# Patient Record
Sex: FEMALE | Race: WHITE | NOT HISPANIC OR LATINO | Employment: OTHER | URBAN - METROPOLITAN AREA
[De-identification: names, ages, dates, MRNs, and addresses within clinical notes are randomized per-mention and may not be internally consistent; named-entity substitution may affect disease eponyms.]

---

## 2018-08-03 ENCOUNTER — HOSPITAL ENCOUNTER (INPATIENT)
Facility: HOSPITAL | Age: 73
LOS: 2 days | Discharge: HOME | DRG: 389 | End: 2018-08-05
Attending: EMERGENCY MEDICINE | Admitting: INTERNAL MEDICINE
Payer: MEDICARE

## 2018-08-03 DIAGNOSIS — Z46.59 ENCOUNTER FOR NASOGASTRIC (NG) TUBE PLACEMENT: ICD-10-CM

## 2018-08-03 DIAGNOSIS — K56.600 SMALL BOWEL OBSTRUCTION, PARTIAL: Primary | ICD-10-CM

## 2018-08-03 LAB
ALBUMIN SERPL BCP-MCNC: 3.8 G/DL
ALP SERPL-CCNC: 88 U/L
ALT SERPL W/O P-5'-P-CCNC: 24 U/L
ANION GAP SERPL CALC-SCNC: 14 MMOL/L
AST SERPL-CCNC: 18 U/L
BASOPHILS # BLD AUTO: 0.02 K/UL
BASOPHILS NFR BLD: 0.2 %
BILIRUB SERPL-MCNC: 0.5 MG/DL
BILIRUB UR QL STRIP: NEGATIVE
BUN SERPL-MCNC: 33 MG/DL
CALCIUM SERPL-MCNC: 9.8 MG/DL
CHLORIDE SERPL-SCNC: 106 MMOL/L
CLARITY UR: CLEAR
CO2 SERPL-SCNC: 21 MMOL/L
COLOR UR: YELLOW
CREAT SERPL-MCNC: 1.8 MG/DL
DIFFERENTIAL METHOD: ABNORMAL
EOSINOPHIL # BLD AUTO: 0.3 K/UL
EOSINOPHIL NFR BLD: 3.1 %
ERYTHROCYTE [DISTWIDTH] IN BLOOD BY AUTOMATED COUNT: 17.9 %
EST. GFR  (AFRICAN AMERICAN): 32 ML/MIN/1.73 M^2
EST. GFR  (NON AFRICAN AMERICAN): 28 ML/MIN/1.73 M^2
GLUCOSE SERPL-MCNC: 144 MG/DL
GLUCOSE UR QL STRIP: NEGATIVE
HCT VFR BLD AUTO: 39.6 %
HGB BLD-MCNC: 12.5 G/DL
HGB UR QL STRIP: ABNORMAL
KETONES UR QL STRIP: NEGATIVE
LEUKOCYTE ESTERASE UR QL STRIP: ABNORMAL
LIPASE SERPL-CCNC: 28 U/L
LYMPHOCYTES # BLD AUTO: 1.1 K/UL
LYMPHOCYTES NFR BLD: 11.3 %
MCH RBC QN AUTO: 25.9 PG
MCHC RBC AUTO-ENTMCNC: 31.6 G/DL
MCV RBC AUTO: 82 FL
MICROSCOPIC COMMENT: ABNORMAL
MONOCYTES # BLD AUTO: 1 K/UL
MONOCYTES NFR BLD: 9.9 %
NEUTROPHILS # BLD AUTO: 7.4 K/UL
NEUTROPHILS NFR BLD: 75.5 %
NITRITE UR QL STRIP: NEGATIVE
PH UR STRIP: 6 [PH] (ref 5–8)
PLATELET # BLD AUTO: 244 K/UL
PMV BLD AUTO: 11.2 FL
POTASSIUM SERPL-SCNC: 4.9 MMOL/L
PROT SERPL-MCNC: 7.7 G/DL
PROT UR QL STRIP: ABNORMAL
RBC # BLD AUTO: 4.83 M/UL
RBC #/AREA URNS HPF: 2 /HPF (ref 0–4)
SODIUM SERPL-SCNC: 141 MMOL/L
SP GR UR STRIP: 1.02 (ref 1–1.03)
URN SPEC COLLECT METH UR: ABNORMAL
UROBILINOGEN UR STRIP-ACNC: NEGATIVE EU/DL
WBC # BLD AUTO: 9.86 K/UL
WBC #/AREA URNS HPF: 19 /HPF (ref 0–5)
WBC CLUMPS URNS QL MICRO: ABNORMAL

## 2018-08-03 PROCEDURE — 83690 ASSAY OF LIPASE: CPT

## 2018-08-03 PROCEDURE — 87077 CULTURE AEROBIC IDENTIFY: CPT

## 2018-08-03 PROCEDURE — 96376 TX/PRO/DX INJ SAME DRUG ADON: CPT

## 2018-08-03 PROCEDURE — 85025 COMPLETE CBC W/AUTO DIFF WBC: CPT

## 2018-08-03 PROCEDURE — 96375 TX/PRO/DX INJ NEW DRUG ADDON: CPT

## 2018-08-03 PROCEDURE — 25000003 PHARM REV CODE 250: Performed by: EMERGENCY MEDICINE

## 2018-08-03 PROCEDURE — 11000001 HC ACUTE MED/SURG PRIVATE ROOM

## 2018-08-03 PROCEDURE — 99285 EMERGENCY DEPT VISIT HI MDM: CPT | Mod: 25

## 2018-08-03 PROCEDURE — 63600175 PHARM REV CODE 636 W HCPCS: Performed by: EMERGENCY MEDICINE

## 2018-08-03 PROCEDURE — 81000 URINALYSIS NONAUTO W/SCOPE: CPT

## 2018-08-03 PROCEDURE — 87186 SC STD MICRODIL/AGAR DIL: CPT

## 2018-08-03 PROCEDURE — 87086 URINE CULTURE/COLONY COUNT: CPT

## 2018-08-03 PROCEDURE — 93010 ELECTROCARDIOGRAM REPORT: CPT | Mod: ,,, | Performed by: INTERNAL MEDICINE

## 2018-08-03 PROCEDURE — 80053 COMPREHEN METABOLIC PANEL: CPT

## 2018-08-03 PROCEDURE — 96361 HYDRATE IV INFUSION ADD-ON: CPT

## 2018-08-03 PROCEDURE — 96374 THER/PROPH/DIAG INJ IV PUSH: CPT

## 2018-08-03 PROCEDURE — 87088 URINE BACTERIA CULTURE: CPT

## 2018-08-03 RX ORDER — ONDANSETRON 2 MG/ML
4 INJECTION INTRAMUSCULAR; INTRAVENOUS
Status: COMPLETED | OUTPATIENT
Start: 2018-08-03 | End: 2018-08-03

## 2018-08-03 RX ORDER — DULOXETIN HYDROCHLORIDE 60 MG/1
60 CAPSULE, DELAYED RELEASE ORAL DAILY
COMMUNITY
End: 2019-06-26

## 2018-08-03 RX ORDER — HYDROMORPHONE HYDROCHLORIDE 2 MG/ML
1 INJECTION, SOLUTION INTRAMUSCULAR; INTRAVENOUS; SUBCUTANEOUS
Status: COMPLETED | OUTPATIENT
Start: 2018-08-03 | End: 2018-08-03

## 2018-08-03 RX ORDER — KETOCONAZOLE 20 MG/G
CREAM TOPICAL DAILY
COMMUNITY
End: 2019-06-26

## 2018-08-03 RX ORDER — MORPHINE SULFATE 4 MG/ML
4 INJECTION, SOLUTION INTRAMUSCULAR; INTRAVENOUS
Status: DISCONTINUED | OUTPATIENT
Start: 2018-08-03 | End: 2018-08-03

## 2018-08-03 RX ORDER — CEPHALEXIN 500 MG/1
500 CAPSULE ORAL 4 TIMES DAILY
Status: ON HOLD | COMMUNITY
End: 2018-08-05 | Stop reason: HOSPADM

## 2018-08-03 RX ADMIN — HYDROMORPHONE HYDROCHLORIDE 1 MG: 2 INJECTION, SOLUTION INTRAMUSCULAR; INTRAVENOUS; SUBCUTANEOUS at 09:08

## 2018-08-03 RX ADMIN — HYDROMORPHONE HYDROCHLORIDE 1 MG: 2 INJECTION, SOLUTION INTRAMUSCULAR; INTRAVENOUS; SUBCUTANEOUS at 11:08

## 2018-08-03 RX ADMIN — ONDANSETRON 4 MG: 2 INJECTION, SOLUTION INTRAMUSCULAR; INTRAVENOUS at 09:08

## 2018-08-03 RX ADMIN — SODIUM CHLORIDE 1000 ML: 0.9 INJECTION, SOLUTION INTRAVENOUS at 09:08

## 2018-08-04 PROBLEM — E11.9 TYPE 2 DIABETES MELLITUS: Chronic | Status: ACTIVE | Noted: 2018-08-04

## 2018-08-04 PROBLEM — M32.9 LUPUS: Chronic | Status: ACTIVE | Noted: 2018-08-04

## 2018-08-04 PROBLEM — Z90.5 HISTORY OF UNILATERAL NEPHRECTOMY: Chronic | Status: ACTIVE | Noted: 2018-08-04

## 2018-08-04 PROBLEM — Z92.21 STATUS POST CHEMOTHERAPY: Status: ACTIVE | Noted: 2018-08-04

## 2018-08-04 PROBLEM — I10 ESSENTIAL HYPERTENSION: Chronic | Status: ACTIVE | Noted: 2018-08-04

## 2018-08-04 PROBLEM — M79.7 FIBROMYALGIA: Chronic | Status: ACTIVE | Noted: 2018-08-04

## 2018-08-04 PROBLEM — N17.9 AKI (ACUTE KIDNEY INJURY): Status: ACTIVE | Noted: 2018-08-04

## 2018-08-04 PROBLEM — K56.600 SMALL BOWEL OBSTRUCTION, PARTIAL: Status: ACTIVE | Noted: 2018-08-04

## 2018-08-04 PROBLEM — C67.9 BLADDER CANCER: Chronic | Status: ACTIVE | Noted: 2018-08-04

## 2018-08-04 LAB
ANION GAP SERPL CALC-SCNC: 11 MMOL/L
BASOPHILS # BLD AUTO: 0.01 K/UL
BASOPHILS NFR BLD: 0.2 %
BUN SERPL-MCNC: 34 MG/DL
CALCIUM SERPL-MCNC: 9 MG/DL
CHLORIDE SERPL-SCNC: 109 MMOL/L
CO2 SERPL-SCNC: 22 MMOL/L
CREAT SERPL-MCNC: 1.8 MG/DL
DIFFERENTIAL METHOD: ABNORMAL
EOSINOPHIL # BLD AUTO: 0.2 K/UL
EOSINOPHIL NFR BLD: 2.8 %
ERYTHROCYTE [DISTWIDTH] IN BLOOD BY AUTOMATED COUNT: 18 %
EST. GFR  (AFRICAN AMERICAN): 32 ML/MIN/1.73 M^2
EST. GFR  (NON AFRICAN AMERICAN): 28 ML/MIN/1.73 M^2
ESTIMATED AVG GLUCOSE: 114 MG/DL
GLUCOSE SERPL-MCNC: 100 MG/DL
HBA1C MFR BLD HPLC: 5.6 %
HCT VFR BLD AUTO: 36.3 %
HGB BLD-MCNC: 11.1 G/DL
LYMPHOCYTES # BLD AUTO: 1 K/UL
LYMPHOCYTES NFR BLD: 17 %
MAGNESIUM SERPL-MCNC: 1.8 MG/DL
MCH RBC QN AUTO: 25.5 PG
MCHC RBC AUTO-ENTMCNC: 30.6 G/DL
MCV RBC AUTO: 83 FL
MONOCYTES # BLD AUTO: 0.8 K/UL
MONOCYTES NFR BLD: 14.5 %
NEUTROPHILS # BLD AUTO: 3.8 K/UL
NEUTROPHILS NFR BLD: 65.5 %
PHOSPHATE SERPL-MCNC: 4.4 MG/DL
PLATELET # BLD AUTO: 217 K/UL
PMV BLD AUTO: 10.6 FL
POCT GLUCOSE: 107 MG/DL (ref 70–110)
POCT GLUCOSE: 110 MG/DL (ref 70–110)
POCT GLUCOSE: 122 MG/DL (ref 70–110)
POCT GLUCOSE: 128 MG/DL (ref 70–110)
POTASSIUM SERPL-SCNC: 5.1 MMOL/L
RBC # BLD AUTO: 4.35 M/UL
SODIUM SERPL-SCNC: 142 MMOL/L
WBC # BLD AUTO: 5.81 K/UL

## 2018-08-04 PROCEDURE — 25000003 PHARM REV CODE 250: Performed by: NURSE PRACTITIONER

## 2018-08-04 PROCEDURE — 63600175 PHARM REV CODE 636 W HCPCS: Performed by: INTERNAL MEDICINE

## 2018-08-04 PROCEDURE — 25000003 PHARM REV CODE 250: Performed by: INTERNAL MEDICINE

## 2018-08-04 PROCEDURE — C9113 INJ PANTOPRAZOLE SODIUM, VIA: HCPCS | Performed by: INTERNAL MEDICINE

## 2018-08-04 PROCEDURE — 11000001 HC ACUTE MED/SURG PRIVATE ROOM

## 2018-08-04 PROCEDURE — 80048 BASIC METABOLIC PNL TOTAL CA: CPT

## 2018-08-04 PROCEDURE — 25000003 PHARM REV CODE 250: Performed by: EMERGENCY MEDICINE

## 2018-08-04 PROCEDURE — 84100 ASSAY OF PHOSPHORUS: CPT

## 2018-08-04 PROCEDURE — 99223 1ST HOSP IP/OBS HIGH 75: CPT | Mod: ,,, | Performed by: SURGERY

## 2018-08-04 PROCEDURE — 63600175 PHARM REV CODE 636 W HCPCS: Performed by: NURSE PRACTITIONER

## 2018-08-04 PROCEDURE — 94761 N-INVAS EAR/PLS OXIMETRY MLT: CPT

## 2018-08-04 PROCEDURE — 83036 HEMOGLOBIN GLYCOSYLATED A1C: CPT

## 2018-08-04 PROCEDURE — 85025 COMPLETE CBC W/AUTO DIFF WBC: CPT

## 2018-08-04 PROCEDURE — 63600175 PHARM REV CODE 636 W HCPCS: Performed by: EMERGENCY MEDICINE

## 2018-08-04 PROCEDURE — 83735 ASSAY OF MAGNESIUM: CPT

## 2018-08-04 RX ORDER — DEXTROSE MONOHYDRATE AND SODIUM CHLORIDE 5; .9 G/100ML; G/100ML
INJECTION, SOLUTION INTRAVENOUS CONTINUOUS
Status: DISCONTINUED | OUTPATIENT
Start: 2018-08-04 | End: 2018-08-05 | Stop reason: HOSPADM

## 2018-08-04 RX ORDER — INSULIN ASPART 100 [IU]/ML
0-5 INJECTION, SOLUTION INTRAVENOUS; SUBCUTANEOUS EVERY 6 HOURS PRN
Status: DISCONTINUED | OUTPATIENT
Start: 2018-08-04 | End: 2018-08-05 | Stop reason: HOSPADM

## 2018-08-04 RX ORDER — SODIUM CHLORIDE 9 MG/ML
INJECTION, SOLUTION INTRAVENOUS CONTINUOUS
Status: DISCONTINUED | OUTPATIENT
Start: 2018-08-04 | End: 2018-08-04

## 2018-08-04 RX ORDER — HYDROMORPHONE HYDROCHLORIDE 1 MG/ML
0.5 INJECTION, SOLUTION INTRAMUSCULAR; INTRAVENOUS; SUBCUTANEOUS EVERY 4 HOURS PRN
Status: DISCONTINUED | OUTPATIENT
Start: 2018-08-04 | End: 2018-08-04

## 2018-08-04 RX ORDER — ONDANSETRON 2 MG/ML
4 INJECTION INTRAMUSCULAR; INTRAVENOUS EVERY 6 HOURS PRN
Status: DISCONTINUED | OUTPATIENT
Start: 2018-08-04 | End: 2018-08-05 | Stop reason: HOSPADM

## 2018-08-04 RX ORDER — DIPHENHYDRAMINE HYDROCHLORIDE 50 MG/ML
12.5 INJECTION INTRAMUSCULAR; INTRAVENOUS EVERY 6 HOURS PRN
Status: DISCONTINUED | OUTPATIENT
Start: 2018-08-04 | End: 2018-08-05 | Stop reason: HOSPADM

## 2018-08-04 RX ORDER — HYDRALAZINE HYDROCHLORIDE 20 MG/ML
10 INJECTION INTRAMUSCULAR; INTRAVENOUS EVERY 6 HOURS PRN
Status: DISCONTINUED | OUTPATIENT
Start: 2018-08-04 | End: 2018-08-05 | Stop reason: HOSPADM

## 2018-08-04 RX ORDER — MORPHINE SULFATE 2 MG/ML
2 INJECTION, SOLUTION INTRAMUSCULAR; INTRAVENOUS EVERY 4 HOURS PRN
Status: DISCONTINUED | OUTPATIENT
Start: 2018-08-04 | End: 2018-08-04

## 2018-08-04 RX ORDER — GLUCAGON 1 MG
1 KIT INJECTION
Status: DISCONTINUED | OUTPATIENT
Start: 2018-08-04 | End: 2018-08-05 | Stop reason: HOSPADM

## 2018-08-04 RX ORDER — HYDROMORPHONE HYDROCHLORIDE 2 MG/ML
1 INJECTION, SOLUTION INTRAMUSCULAR; INTRAVENOUS; SUBCUTANEOUS
Status: COMPLETED | OUTPATIENT
Start: 2018-08-04 | End: 2018-08-04

## 2018-08-04 RX ADMIN — ONDANSETRON 4 MG: 2 INJECTION, SOLUTION INTRAMUSCULAR; INTRAVENOUS at 12:08

## 2018-08-04 RX ADMIN — HYDROMORPHONE HYDROCHLORIDE 1 MG: 2 INJECTION, SOLUTION INTRAMUSCULAR; INTRAVENOUS; SUBCUTANEOUS at 12:08

## 2018-08-04 RX ADMIN — DIPHENHYDRAMINE HYDROCHLORIDE 12.5 MG: 50 INJECTION, SOLUTION INTRAMUSCULAR; INTRAVENOUS at 10:08

## 2018-08-04 RX ADMIN — PROMETHAZINE HYDROCHLORIDE 12.5 MG: 25 INJECTION INTRAMUSCULAR; INTRAVENOUS at 12:08

## 2018-08-04 RX ADMIN — DEXTROSE 40 MG: 50 INJECTION, SOLUTION INTRAVENOUS at 08:08

## 2018-08-04 RX ADMIN — DEXTROSE MONOHYDRATE AND SODIUM CHLORIDE: 5; .9 INJECTION, SOLUTION INTRAVENOUS at 06:08

## 2018-08-04 RX ADMIN — SODIUM CHLORIDE: 0.9 INJECTION, SOLUTION INTRAVENOUS at 02:08

## 2018-08-04 RX ADMIN — DIPHENHYDRAMINE HYDROCHLORIDE 12.5 MG: 50 INJECTION, SOLUTION INTRAMUSCULAR; INTRAVENOUS at 04:08

## 2018-08-04 RX ADMIN — DEXTROSE MONOHYDRATE AND SODIUM CHLORIDE: 5; .9 INJECTION, SOLUTION INTRAVENOUS at 03:08

## 2018-08-04 RX ADMIN — DIPHENHYDRAMINE HYDROCHLORIDE 12.5 MG: 50 INJECTION, SOLUTION INTRAMUSCULAR; INTRAVENOUS at 11:08

## 2018-08-04 RX ADMIN — Medication 2 MG: at 08:08

## 2018-08-04 NOTE — PROGRESS NOTES
"Pt's NG tube coiled in back of throat. Attempted to pull out NG tube a few centimeters and reinsert NG tube. Pt unable to tolerate. NG tube removed. Pt refused reinsertion of NG tube. Pt stated "I don't want the tube. I have the right to refuse." Barb Zurita, [facility] Medicine NP notified. Okay to leave out NG tube per NP.  "

## 2018-08-04 NOTE — PROGRESS NOTES
[facility] Hospital Medicine  Progress Note    Patient Name: Su Callejas  MRN: [id number]  Patient Class: IP- Inpatient   Admission Date: 8/3/2018  Length of Stay: 0 days  Attending Physician: Chely Goldstein MD  Primary Care Provider: Hasmukh Perdomo MD        Subjective:     Principal Problem:Small bowel obstruction, partial    HPI:  Ms. Callejas is a 73-year-old female with a past medical history of kidney cancer with remote left nephrectomy, bladder cancer with recent chemotherapy treatment, type 2 diabetes, hypertension, lupus, fibromyalgia, and history of bowel resection in early 2017.  She presented to the emergency department with complaints of generalized abdominal pain that has progressively worsened throughout the day today.  Associated nausea, vomiting, and chills.  No aggravating or alleviating factors.  Denies any abdominal distention, diarrhea, constipation, dysuria, hematuria, hematemesis, melena, hematochezia, chest pain, palpitations, shortness breath, cough, orthopnea, edema, lightheadedness or dizziness, syncope, headache, altered mental status, or fever.  Patient states she was seen in an ED three weeks ago for abdominal pain and was diagnosed with constipation.  She states current abdominal pain is different from the pain she had three weeks ago.  She recently moved to [place] from [place] three weeks ago due to emergent family issues.  She was under the care of an oncologist and urologist in [place], and was undergoing chemotherapy treatment at the time of her move, last treatment was four weeks ago.  She completed four weeks of chemotherapy, but treatment plan was to complete six weeks.  She has not established care with a local oncologist or urologist since moving from [place].  Workup in ED resulted creatinine 1.8, BUN 33.  CT of ABD/pelvis showed multiple loops of mildly dilated small bowel in the upper abdomen with possible transition in the right mid  "abdomen suggestive of developing or partial small bowel obstruction.  In the ED, patient received IV Dilaudid, IV antiemetics, and IV fluids.  Hospital Medicine was called for admission.  Currently patient appears comfortable in no acute distress, she is drowsy from recent IV analgesics given in ED.      Hospital Course:  On 8/3 patient was admitted to [facility] secondary to a Partial SBO.  CT ABD/pelvis showed multiple loops of mildly dilated fluid-filled small bowel noted involving the upper abdomen with transition in the mid abdomen suggesting partial or developing small bowel obstruction.  Patient was placed NPO and an NGT was placed to low wall suction.  IVFs started.  As of 8/4 patient has been evaluated by Dr. Tate with general surgery who agrees with conservative treatment with ambulation and IV hydration. Patient has a h/o right nephrectomy in 2015 secondary to h/o kidney cancer.  Cr currently 1.8.  Baseline unknown as patient has recently moved from [place].  Also has a h/o bladder cancer and last reported chemo approximately 4 weeks ago.  Will need to establish outpatient f/u upon DC.  Patient reports being started on Keflex by PCP yesterday for a "bladder infection".  UA negative for nitrites, trace leukocytes.  UC pending.  Hold off on ABX for now.    Interval History:  No acute events overnight.  Resting comfortably in bed.  NGT remains to low wall suction.  Reports abdominal pain has resolved.  BS hypoactive.  General surgery following.  All questions answered.  Updated on POC.      Review of Systems   Constitutional: Positive for fatigue. Negative for chills, diaphoresis, fever and unexpected weight change.   HENT: Negative for congestion, sore throat and trouble swallowing.    Respiratory: Negative for cough, shortness of breath and wheezing.    Cardiovascular: Negative for chest pain, palpitations and leg swelling.   Gastrointestinal: Negative for abdominal distention, abdominal pain, anal bleeding, " blood in stool, constipation, diarrhea, nausea, rectal pain and vomiting.        Abdominal pain, N/V has resolved with NGT   Genitourinary: Negative for difficulty urinating, dysuria, frequency, hematuria and urgency.   Musculoskeletal: Negative for arthralgias, back pain and myalgias.   Skin: Negative for pallor, rash and wound.   Neurological: Negative for dizziness, syncope, weakness, light-headedness, numbness and headaches.   Psychiatric/Behavioral: Negative for confusion. The patient is not nervous/anxious.    All other systems reviewed and are negative.    Objective:     Vital Signs (Most Recent):  Temp: 97.9 °F (36.6 °C) (08/04/18 1105)  Pulse: 82 (08/04/18 1318)  Resp: 16 (08/04/18 1105)  BP: (!) 148/70 (08/04/18 1105)  SpO2: (!) 92 % (08/04/18 1105) Vital Signs (24h Range):  Temp:  [97.9 °F (36.6 °C)-99.4 °F (37.4 °C)] 97.9 °F (36.6 °C)  Pulse:  [] 82  Resp:  [6-25] 16  SpO2:  [92 %-99 %] 92 %  BP: (123-175)/(63-91) 148/70     Weight: 59.6 kg (131 lb 6.3 oz)  Body mass index is 24.83 kg/m².    Intake/Output Summary (Last 24 hours) at 08/04/18 1419  Last data filed at 08/04/18 0600   Gross per 24 hour   Intake              625 ml   Output              450 ml   Net              175 ml      Physical Exam   Constitutional: She is oriented to person, place, and time. She appears well-developed and well-nourished. She is easily aroused. No distress.   HENT:   Head: Normocephalic and atraumatic.   NG tube in place.   Eyes: Conjunctivae are normal.   PERRL; EOM intact.   Neck: Normal range of motion. Neck supple.   Cardiovascular: Normal rate, regular rhythm, S1 normal, S2 normal and intact distal pulses.   No extrasystoles are present. Exam reveals no gallop and no friction rub.    No murmur heard.  Pulses:       Radial pulses are 2+ on the right side, and 2+ on the left side.        Dorsalis pedis pulses are 2+ on the right side, and 2+ on the left side.        Posterior tibial pulses are 2+ on the right  side, and 2+ on the left side.   Pulmonary/Chest: Effort normal and breath sounds normal. No accessory muscle usage. No tachypnea. No respiratory distress. She has no wheezes. She has no rhonchi. She has no rales.   Abdominal: Soft. She exhibits no distension and no ascites. Bowel sounds are decreased. There is no hepatosplenomegaly. There is no tenderness. There is no rigidity, no rebound, no guarding, no CVA tenderness and negative Larose's sign.   NG tube to low wall suction with dark green-brown fluid noted   Musculoskeletal: Normal range of motion. She exhibits no edema, tenderness or deformity.   Neurological: She is alert, oriented to person, place, and time and easily aroused. No cranial nerve deficit or sensory deficit.   Skin: Skin is warm, dry and intact. Capillary refill takes less than 2 seconds. No rash noted. She is not diaphoretic. No cyanosis or erythema.   Psychiatric: Her speech is normal. Cognition and memory are normal.   Nursing note and vitals reviewed.      Significant Labs:   CBC:     Recent Labs  Lab 08/03/18 2118 08/04/18  0435   WBC 9.86 5.81   HGB 12.5 11.1*   HCT 39.6 36.3*    217     CMP:     Recent Labs  Lab 08/03/18 2118 08/04/18  0435    142   K 4.9 5.1    109   CO2 21* 22*   * 100   BUN 33* 34*   CREATININE 1.8* 1.8*   CALCIUM 9.8 9.0   PROT 7.7  --    ALBUMIN 3.8  --    BILITOT 0.5  --    ALKPHOS 88  --    AST 18  --    ALT 24  --    ANIONGAP 14 11   EGFRNONAA 28* 28*     Urine Studies:     Recent Labs  Lab 08/03/18  2233   COLORU Yellow   APPEARANCEUA Clear   PHUR 6.0   SPECGRAV 1.025   PROTEINUA Trace*   GLUCUA Negative   KETONESU Negative   BILIRUBINUA Negative   OCCULTUA 1+*   NITRITE Negative   UROBILINOGEN Negative   LEUKOCYTESUR Trace*   RBCUA 2   WBCUA 19*       Significant Imaging: I have reviewed all pertinent imaging results/findings within the past 24 hours.     Imaging Results          X-Ray Abdomen Portable (Final result)  Result time  08/04/18 08:58:57    Final result by Ofelia Arriola MD (08/04/18 08:58:57)                 Impression:      Nasogastric tube terminates in proximal stomach.  Recommend advancing at least 5cm.      Electronically signed by: Ofelia Arriola MD  Date:    08/04/2018  Time:    08:58             Narrative:    EXAMINATION:  XR ABDOMEN PORTABLE    CLINICAL HISTORY:  Encounter for fitting and adjustment of other gastrointestinal appliance and device    COMPARISON:  None    FINDINGS:  Bowel gas pattern is within normal limits.  Nasogastric tube terminates in proximal stomach.  No evidence of free air or pneumatosis.  Upright view of the chest is unremarkable.                               CT Abdomen Pelvis  Without Contrast (Final result)  Result time 08/03/18 22:46:23   Procedure changed from CT Abdomen Pelvis With Contrast     Final result by Isaiah Weiner MD (08/03/18 22:46:23)                 Impression:      Multiple loops of mildly dilated small bowel in the upper abdomen with possible transition in the right mid abdomen.  Findings suggestive of developing or partial small bowel obstruction.  Patient status post left nephrectomy.  Evidence of prior bowel surgery.    All CT scans at this facility are performed  using dose modulation techniques as appropriate to performed exam including the following:  automated exposure control; adjustment of mA and/or kV according to the patients size (this includes techniques or standardized protocols for targeted exams where dose is matched to indication/reason for exam: i.e. extremities or head);  iterative reconstruction technique.      Electronically signed by: Isaiah Weiner MD  Date:    08/03/2018  Time:    22:46             Narrative:    EXAMINATION:  CT ABDOMEN PELVIS WITHOUT CONTRAST    CLINICAL HISTORY:  Abd pain, fever, abscess suspected;    FINDINGS:  The lung bases are clear.    The liver and spleen are normal.    The gallbladder is normal. The pancreas is  unremarkable.    No adrenal masses are identified.    The right kidney is normal.  Left kidney is been removed.  Surgical clips in the upper abdomen.    Aortic and branch structure atherosclerosis.    Urinary bladder appears normal.    Multiple loops of mildly dilated fluid-filled small bowel noted involving the upper abdomen with transition in the mid abdomen suggesting partial or developing small bowel obstruction.    No acute osseous findings.                               X-Ray Chest AP Portable (Final result)  Result time 08/03/18 21:07:37    Final result by Vinh Lanier MD (08/03/18 21:07:37)                 Impression:      No acute process seen.      Electronically signed by: Vinh Lanier MD  Date:    08/03/2018  Time:    21:07             Narrative:    EXAMINATION:  XR CHEST AP PORTABLE    CLINICAL HISTORY:  epigastric pain;    FINDINGS:  Single view of the chest.  Aorta demonstrates atherosclerotic disease.    Cardiac silhouette is normal.  The lungs demonstrate no evidence of active disease.  No evidence of pleural effusion or pneumothorax.  Bones demonstrate mild degenerative change.                              Assessment/Plan:      * Small bowel obstruction, partial    - CT ABD/pelvis showed multiple loops of mildly dilated fluid-filled small bowel noted involving the upper abdomen with transition in the mid abdomen suggesting partial or developing small bowel obstruction.  - Bowel rest with NG tube to intermittent low wall suction.  - IV hydration.  - PRN antiemetics and analgesics.  - General Surgery following and agrees with conservative treatment  - Avoid narcotics  - Monitor        RYDER (acute kidney injury)    - Likely due to dehydration  - Cr currently 1.8  - Baseline Cr unknown  - IV hydration, strict I&O  - Avoid nephrotoxic agents  - Follow daily BMP        History of kidney cancer with remote unilateral nephrectomy     - Reports right nephrectomy in 2015  - Follow kidney function  closely  - Avoid nephrotoxic agents        Bladder cancer    - Recently moved from [place] where she was followed by Urology and Oncology  - Reports Last chemo approximately 4 weeks ago  - Will need to establish care with local Urologist and Oncologist upon DC        Type 2 diabetes mellitus    - HbA1c 5.6  - Accuchecks with hypoglycemic protocol while NPO.  Add SSI as needed.  - Will add dextrose to IVF's if needed while NPO.        Essential hypertension    - BP under fair control  - Continue PRN IV Hydralazine  - Monitor          VTE Risk Mitigation         Ordered     Place sequential compression device  Until discontinued      08/04/18 0321     IP VTE HIGH RISK PATIENT  Once      08/04/18 0321     Reason for No Pharmacological VTE Prophylaxis  Once      08/04/18 0321              Barb Zurita, FRANCISCO J, ACNP-BC  Department of Hospital Medicine   [facility]

## 2018-08-04 NOTE — ED PROVIDER NOTES
SCRIBE #1 NOTE: I, Corinne Mack, am scribing for, and in the presence of, Juyd Enciso Do, MD. I have scribed the entire note.      History      Chief Complaint   Patient presents with    Abdominal Pain     with n/v. Currently being treated for bladder cancer       Review of patient's allergies indicates:   Allergen Reactions    Sulfa (sulfonamide antibiotics)      Throat closing        HPI   HPI    8/3/2018, 8:42 PM   History obtained from the patient      History of Present Illness: Su Callejas is a 73 y.o. female patient s/p L kidney removal due to renal cancer with PMHx of bladder cancer who presents to the Emergency Department for abd pain which onset today. Pt moved to [place] 3 weeks ago. Per the son, pt was seen in the ED on the way to [place] to [place] for abd pain and was Dx with constipation. Pt states that this pain is not similar to 3 weeks ago. Pt has bladder cancer and received 4 weeks of chemo Tx for her bladder cancer, but was supposed to have 6 weeks of Tx. Pt was moved to [place] under emergent familial circumstances and is not set up with a urologist or an oncologist yet. Pt also reports that she has been taking her pain medication less than instructed. Symptoms are constant and moderate in severity. No mitigating or exacerbating factors reported. Associated sxs include chills and N/V. Patient denies any fever, CP, SOB, diarrhea, back pain, neck pain, dysuria, hematuria, HA, dizziness, and all other sxs at this time. No prior Tx reported. No further complaints or concerns at this time.         Arrival mode: Personal vehicle    PCP: Hasmukh Perdomo MD       Past Medical History:  Past Medical History:   Diagnosis Date    Bladder cancer     Cancer     kidney    Diabetes mellitus     Fibromyalgia     History of kidney cancer     Hypertension     Lupus        Past Surgical History:  History reviewed. No pertinent surgical history.      Family History:  History reviewed.  No pertinent family history.    Social History:  Social History     Social History Main Topics    Smoking status: Light Tobacco Smoker     Years: 25.00     Types: Cigarettes    Smokeless tobacco: Never Used    Alcohol use No    Drug use: No    Sexual activity: No       ROS   Review of Systems   Constitutional: Positive for chills. Negative for fever.   Respiratory: Negative for cough and shortness of breath.    Cardiovascular: Negative for chest pain and leg swelling.   Gastrointestinal: Positive for nausea and vomiting. Negative for abdominal pain and diarrhea.   Genitourinary: Negative for dysuria, flank pain and hematuria.   Musculoskeletal: Negative for back pain, neck pain and neck stiffness.   Skin: Negative for rash and wound.   Neurological: Negative for dizziness, light-headedness, numbness and headaches.   All other systems reviewed and are negative.    Physical Exam      Initial Vitals [08/03/18 2027]   BP Pulse Resp Temp SpO2   123/75 82 (!) 25 98.6 °F (37 °C) 96 %      MAP       --          Physical Exam  Nursing Notes and Vital Signs Reviewed.  Constitutional: Patient is in mild distress secondary to pain. Moaning in pain. Well-developed and well-nourished.  Head: Atraumatic. Normocephalic.  Eyes: PERRL. EOM intact. Conjunctivae are not pale. No scleral icterus.  ENT: Mucous membranes are moist. Oropharynx is clear and symmetric.    Neck: Supple. Full ROM. No lymphadenopathy.  Cardiovascular: Regular rate. Regular rhythm. No murmurs, rubs, or gallops. Distal pulses are 2+ and symmetric.  Pulmonary/Chest: No respiratory distress. Clear to auscultation bilaterally. No wheezing or rales.  Abdominal: Soft and non-distended.  There is generalized tenderness.  No rebound, guarding, or rigidity.  Musculoskeletal: Moves all extremities. No obvious deformities. No edema.   Skin: Warm and dry.  Neurological:  Alert, awake, and appropriate.  Normal speech.  No acute focal neurological deficits are  "appreciated.  Psychiatric: Normal affect. Good eye contact. Appropriate in content.    ED Course    Procedures  ED Vital Signs:  Vitals:    08/03/18 2027 08/03/18 2045 08/03/18 2128 08/03/18 2202   BP: 123/75   (!) 140/64   Pulse: 82  81 77   Resp: (!) 25   (!) 24   Temp: 98.6 °F (37 °C)      TempSrc: Oral      SpO2: 96%   96%   Weight:  75.4 kg (166 lb 4 oz)     Height: 5' 1" (1.549 m)       08/03/18 2301   BP: (!) 148/65   Pulse: 81   Resp: (!) 32   Temp:    TempSrc:    SpO2: 99%   Weight:    Height:        Abnormal Lab Results:  Labs Reviewed   CBC W/ AUTO DIFFERENTIAL - Abnormal; Notable for the following:        Result Value    MCH 25.9 (*)     MCHC 31.6 (*)     RDW 17.9 (*)     Gran% 75.5 (*)     Lymph% 11.3 (*)     All other components within normal limits   COMPREHENSIVE METABOLIC PANEL - Abnormal; Notable for the following:     CO2 21 (*)     Glucose 144 (*)     BUN, Bld 33 (*)     Creatinine 1.8 (*)     eGFR if  32 (*)     eGFR if non  28 (*)     All other components within normal limits   URINALYSIS - Abnormal; Notable for the following:     Protein, UA Trace (*)     Occult Blood UA 1+ (*)     Leukocytes, UA Trace (*)     All other components within normal limits   URINALYSIS MICROSCOPIC - Abnormal; Notable for the following:     WBC, UA 19 (*)     WBC Clumps, UA Occasional (*)     All other components within normal limits   LIPASE        All Lab Results:  Results for orders placed or performed during the hospital encounter of 08/03/18   CBC W/ AUTO DIFFERENTIAL   Result Value Ref Range    WBC 9.86 3.90 - 12.70 K/uL    RBC 4.83 4.00 - 5.40 M/uL    Hemoglobin 12.5 12.0 - 16.0 g/dL    Hematocrit 39.6 37.0 - 48.5 %    MCV 82 82 - 98 fL    MCH 25.9 (L) 27.0 - 31.0 pg    MCHC 31.6 (L) 32.0 - 36.0 g/dL    RDW 17.9 (H) 11.5 - 14.5 %    Platelets 244 150 - 350 K/uL    MPV 11.2 9.2 - 12.9 fL    Gran # (ANC) 7.4 1.8 - 7.7 K/uL    Lymph # 1.1 1.0 - 4.8 K/uL    Mono # 1.0 0.3 - 1.0 " K/uL    Eos # 0.3 0.0 - 0.5 K/uL    Baso # 0.02 0.00 - 0.20 K/uL    Gran% 75.5 (H) 38.0 - 73.0 %    Lymph% 11.3 (L) 18.0 - 48.0 %    Mono% 9.9 4.0 - 15.0 %    Eosinophil% 3.1 0.0 - 8.0 %    Basophil% 0.2 0.0 - 1.9 %    Differential Method Automated    Comp. Metabolic Panel   Result Value Ref Range    Sodium 141 136 - 145 mmol/L    Potassium 4.9 3.5 - 5.1 mmol/L    Chloride 106 95 - 110 mmol/L    CO2 21 (L) 23 - 29 mmol/L    Glucose 144 (H) 70 - 110 mg/dL    BUN, Bld 33 (H) 8 - 23 mg/dL    Creatinine 1.8 (H) 0.5 - 1.4 mg/dL    Calcium 9.8 8.7 - 10.5 mg/dL    Total Protein 7.7 6.0 - 8.4 g/dL    Albumin 3.8 3.5 - 5.2 g/dL    Total Bilirubin 0.5 0.1 - 1.0 mg/dL    Alkaline Phosphatase 88 55 - 135 U/L    AST 18 10 - 40 U/L    ALT 24 10 - 44 U/L    Anion Gap 14 8 - 16 mmol/L    eGFR if African American 32 (A) >60 mL/min/1.73 m^2    eGFR if non African American 28 (A) >60 mL/min/1.73 m^2   Lipase   Result Value Ref Range    Lipase 28 4 - 60 U/L   Urinalysis - Clean Catch   Result Value Ref Range    Specimen UA Urine, Clean Catch     Color, UA Yellow Yellow, Straw, Rena    Appearance, UA Clear Clear    pH, UA 6.0 5.0 - 8.0    Specific Gravity, UA 1.025 1.005 - 1.030    Protein, UA Trace (A) Negative    Glucose, UA Negative Negative    Ketones, UA Negative Negative    Bilirubin (UA) Negative Negative    Occult Blood UA 1+ (A) Negative    Nitrite, UA Negative Negative    Urobilinogen, UA Negative <2.0 EU/dL    Leukocytes, UA Trace (A) Negative   Urinalysis Microscopic   Result Value Ref Range    RBC, UA 2 0 - 4 /hpf    WBC, UA 19 (H) 0 - 5 /hpf    WBC Clumps, UA Occasional (A) None-Rare    Microscopic Comment SEE COMMENT        Imaging Results:  Imaging Results          CT Abdomen Pelvis  Without Contrast (Final result)  Result time 08/03/18 22:46:23   Procedure changed from CT Abdomen Pelvis With Contrast     Final result by Isaiah Weiner MD (08/03/18 22:46:23)                 Impression:      Multiple loops of mildly  dilated small bowel in the upper abdomen with possible transition in the right mid abdomen.  Findings suggestive of developing or partial small bowel obstruction.  Patient status post left nephrectomy.  Evidence of prior bowel surgery.    All CT scans at this facility are performed  using dose modulation techniques as appropriate to performed exam including the following:  automated exposure control; adjustment of mA and/or kV according to the patients size (this includes techniques or standardized protocols for targeted exams where dose is matched to indication/reason for exam: i.e. extremities or head);  iterative reconstruction technique.      Electronically signed by: Isaiah Weiner MD  Date:    08/03/2018  Time:    22:46             Narrative:    EXAMINATION:  CT ABDOMEN PELVIS WITHOUT CONTRAST    CLINICAL HISTORY:  Abd pain, fever, abscess suspected;    FINDINGS:  The lung bases are clear.    The liver and spleen are normal.    The gallbladder is normal. The pancreas is unremarkable.    No adrenal masses are identified.    The right kidney is normal.  Left kidney is been removed.  Surgical clips in the upper abdomen.    Aortic and branch structure atherosclerosis.    Urinary bladder appears normal.    Multiple loops of mildly dilated fluid-filled small bowel noted involving the upper abdomen with transition in the mid abdomen suggesting partial or developing small bowel obstruction.    No acute osseous findings.                               X-Ray Chest AP Portable (Final result)  Result time 08/03/18 21:07:37    Final result by Vinh Lanier MD (08/03/18 21:07:37)                 Impression:      No acute process seen.      Electronically signed by: Vinh Lanier MD  Date:    08/03/2018  Time:    21:07             Narrative:    EXAMINATION:  XR CHEST AP PORTABLE    CLINICAL HISTORY:  epigastric pain;    FINDINGS:  Single view of the chest.  Aorta demonstrates atherosclerotic disease.    Cardiac silhouette  is normal.  The lungs demonstrate no evidence of active disease.  No evidence of pleural effusion or pneumothorax.  Bones demonstrate mild degenerative change.                                 The EKG was ordered, reviewed, and independently interpreted by the ED provider.  Interpretation time: 2103  Rate: 76 BPM  Rhythm: normal sinus rhythm  Interpretation: No STEMI.             The Emergency Provider reviewed the vital signs and test results, which are outlined above.    ED Discussion     11:58 PM: Discussed case with Dr. Gomez ([facility]). Dr. Gomez agrees with current care and management of pt and accepts admission.   Admitting Service: [facility] medicine   Admitting Physician: Dr. Gomez  Admit to: Obs Tele    12:01 AM: Re-evaluated pt. I have discussed test results, shared treatment plan, and the need for admission with patient and family at bedside. Pt and family express understanding at this time and agree with all information. All questions answered. Pt and family have no further questions or concerns at this time. Pt is ready for admit.      ED Medication(s):  Medications   sodium chloride 0.9% bolus 1,000 mL (0 mLs Intravenous Stopped 8/3/18 2227)   ondansetron injection 4 mg (4 mg Intravenous Given 8/3/18 2100)   hydromorphone (PF) injection 1 mg (1 mg Intravenous Given 8/3/18 2117)   hydromorphone (PF) injection 1 mg (1 mg Intravenous Given 8/3/18 2310)       New Prescriptions    No medications on file             Medical Decision Making    Medical Decision Making:   Clinical Tests:   Lab Tests: Ordered and Reviewed  Radiological Study: Ordered and Reviewed  Medical Tests: Ordered and Reviewed           Scribe Attestation:   Scribe #1: I performed the above scribed service and the documentation accurately describes the services I performed. I attest to the accuracy of the note.    Attending:   Physician Attestation Statement for Scribe #1: I, Judy Enciso Do, MD, personally performed the  services described in this documentation, as scribed by Corinne Mack, in my presence, and it is both accurate and complete.          Clinical Impression       ICD-10-CM ICD-9-CM   1. Small bowel obstruction, partial K56.600 560.9       Disposition:   Disposition: Placed in Observation  Condition: Oumou Enciso Do, MD  08/04/18 0132

## 2018-08-04 NOTE — HOSPITAL COURSE
She was admitted with NG tube decompression, and observation. She feels better but could not recall if she had any flatus today.     8/5/18 09:16 am: The patient was seen and examined. Soft abdomen and had huge bowel movement according to the patient. Will advance diet.

## 2018-08-04 NOTE — SUBJECTIVE & OBJECTIVE
Interval History:  No acute events overnight.  Resting comfortably in bed.  NGT remains to low wall suction.  Reports abdominal pain has resolved.  BS hypoactive.  General surgery following.  All questions answered.  Updated on POC.      Review of Systems   Constitutional: Positive for fatigue. Negative for chills, diaphoresis, fever and unexpected weight change.   HENT: Negative for congestion, sore throat and trouble swallowing.    Respiratory: Negative for cough, shortness of breath and wheezing.    Cardiovascular: Negative for chest pain, palpitations and leg swelling.   Gastrointestinal: Negative for abdominal distention, abdominal pain, anal bleeding, blood in stool, constipation, diarrhea, nausea, rectal pain and vomiting.        Abdominal pain, N/V has resolved with NGT   Genitourinary: Negative for difficulty urinating, dysuria, frequency, hematuria and urgency.   Musculoskeletal: Negative for arthralgias, back pain and myalgias.   Skin: Negative for pallor, rash and wound.   Neurological: Negative for dizziness, syncope, weakness, light-headedness, numbness and headaches.   Psychiatric/Behavioral: Negative for confusion. The patient is not nervous/anxious.    All other systems reviewed and are negative.    Objective:     Vital Signs (Most Recent):  Temp: 97.9 °F (36.6 °C) (08/04/18 1105)  Pulse: 82 (08/04/18 1318)  Resp: 16 (08/04/18 1105)  BP: (!) 148/70 (08/04/18 1105)  SpO2: (!) 92 % (08/04/18 1105) Vital Signs (24h Range):  Temp:  [97.9 °F (36.6 °C)-99.4 °F (37.4 °C)] 97.9 °F (36.6 °C)  Pulse:  [] 82  Resp:  [6-25] 16  SpO2:  [92 %-99 %] 92 %  BP: (123-175)/(63-91) 148/70     Weight: 59.6 kg (131 lb 6.3 oz)  Body mass index is 24.83 kg/m².    Intake/Output Summary (Last 24 hours) at 08/04/18 1419  Last data filed at 08/04/18 0600   Gross per 24 hour   Intake              625 ml   Output              450 ml   Net              175 ml      Physical Exam   Constitutional: She is oriented to person,  place, and time. She appears well-developed and well-nourished. She is easily aroused. No distress.   HENT:   Head: Normocephalic and atraumatic.   NG tube in place.   Eyes: Conjunctivae are normal.   PERRL; EOM intact.   Neck: Normal range of motion. Neck supple.   Cardiovascular: Normal rate, regular rhythm, S1 normal, S2 normal and intact distal pulses.   No extrasystoles are present. Exam reveals no gallop and no friction rub.    No murmur heard.  Pulses:       Radial pulses are 2+ on the right side, and 2+ on the left side.        Dorsalis pedis pulses are 2+ on the right side, and 2+ on the left side.        Posterior tibial pulses are 2+ on the right side, and 2+ on the left side.   Pulmonary/Chest: Effort normal and breath sounds normal. No accessory muscle usage. No tachypnea. No respiratory distress. She has no wheezes. She has no rhonchi. She has no rales.   Abdominal: Soft. She exhibits no distension and no ascites. Bowel sounds are decreased. There is no hepatosplenomegaly. There is no tenderness. There is no rigidity, no rebound, no guarding, no CVA tenderness and negative Larose's sign.   NG tube to low wall suction with dark green-brown fluid noted   Musculoskeletal: Normal range of motion. She exhibits no edema, tenderness or deformity.   Neurological: She is alert, oriented to person, place, and time and easily aroused. No cranial nerve deficit or sensory deficit.   Skin: Skin is warm, dry and intact. Capillary refill takes less than 2 seconds. No rash noted. She is not diaphoretic. No cyanosis or erythema.   Psychiatric: Her speech is normal. Cognition and memory are normal.   Nursing note and vitals reviewed.      Significant Labs:   CBC:     Recent Labs  Lab 08/03/18 2118 08/04/18  0435   WBC 9.86 5.81   HGB 12.5 11.1*   HCT 39.6 36.3*    217     CMP:     Recent Labs  Lab 08/03/18 2118 08/04/18  0435    142   K 4.9 5.1    109   CO2 21* 22*   * 100   BUN 33* 34*    CREATININE 1.8* 1.8*   CALCIUM 9.8 9.0   PROT 7.7  --    ALBUMIN 3.8  --    BILITOT 0.5  --    ALKPHOS 88  --    AST 18  --    ALT 24  --    ANIONGAP 14 11   EGFRNONAA 28* 28*     Urine Studies:     Recent Labs  Lab 08/03/18  2233   COLORU Yellow   APPEARANCEUA Clear   PHUR 6.0   SPECGRAV 1.025   PROTEINUA Trace*   GLUCUA Negative   KETONESU Negative   BILIRUBINUA Negative   OCCULTUA 1+*   NITRITE Negative   UROBILINOGEN Negative   LEUKOCYTESUR Trace*   RBCUA 2   WBCUA 19*       Significant Imaging: I have reviewed all pertinent imaging results/findings within the past 24 hours.     Imaging Results          X-Ray Abdomen Portable (Final result)  Result time 08/04/18 08:58:57    Final result by Ofelia Arriola MD (08/04/18 08:58:57)                 Impression:      Nasogastric tube terminates in proximal stomach.  Recommend advancing at least 5cm.      Electronically signed by: Ofelia Arriola MD  Date:    08/04/2018  Time:    08:58             Narrative:    EXAMINATION:  XR ABDOMEN PORTABLE    CLINICAL HISTORY:  Encounter for fitting and adjustment of other gastrointestinal appliance and device    COMPARISON:  None    FINDINGS:  Bowel gas pattern is within normal limits.  Nasogastric tube terminates in proximal stomach.  No evidence of free air or pneumatosis.  Upright view of the chest is unremarkable.                               CT Abdomen Pelvis  Without Contrast (Final result)  Result time 08/03/18 22:46:23   Procedure changed from CT Abdomen Pelvis With Contrast     Final result by Isaiah Weiner MD (08/03/18 22:46:23)                 Impression:      Multiple loops of mildly dilated small bowel in the upper abdomen with possible transition in the right mid abdomen.  Findings suggestive of developing or partial small bowel obstruction.  Patient status post left nephrectomy.  Evidence of prior bowel surgery.    All CT scans at this facility are performed  using dose modulation techniques as  appropriate to performed exam including the following:  automated exposure control; adjustment of mA and/or kV according to the patients size (this includes techniques or standardized protocols for targeted exams where dose is matched to indication/reason for exam: i.e. extremities or head);  iterative reconstruction technique.      Electronically signed by: Isaiah Weiner MD  Date:    08/03/2018  Time:    22:46             Narrative:    EXAMINATION:  CT ABDOMEN PELVIS WITHOUT CONTRAST    CLINICAL HISTORY:  Abd pain, fever, abscess suspected;    FINDINGS:  The lung bases are clear.    The liver and spleen are normal.    The gallbladder is normal. The pancreas is unremarkable.    No adrenal masses are identified.    The right kidney is normal.  Left kidney is been removed.  Surgical clips in the upper abdomen.    Aortic and branch structure atherosclerosis.    Urinary bladder appears normal.    Multiple loops of mildly dilated fluid-filled small bowel noted involving the upper abdomen with transition in the mid abdomen suggesting partial or developing small bowel obstruction.    No acute osseous findings.                               X-Ray Chest AP Portable (Final result)  Result time 08/03/18 21:07:37    Final result by Vinh Lanier MD (08/03/18 21:07:37)                 Impression:      No acute process seen.      Electronically signed by: Vinh Lanier MD  Date:    08/03/2018  Time:    21:07             Narrative:    EXAMINATION:  XR CHEST AP PORTABLE    CLINICAL HISTORY:  epigastric pain;    FINDINGS:  Single view of the chest.  Aorta demonstrates atherosclerotic disease.    Cardiac silhouette is normal.  The lungs demonstrate no evidence of active disease.  No evidence of pleural effusion or pneumothorax.  Bones demonstrate mild degenerative change.

## 2018-08-04 NOTE — ASSESSMENT & PLAN NOTE
- CT ABD/pelvis showed multiple loops of mildly dilated fluid-filled small bowel noted involving the upper abdomen with transition in the mid abdomen suggesting partial or developing small bowel obstruction.  - Bowel rest with NG tube to intermittent low wall suction.  - IV hydration.  - PRN antiemetics and analgesics.  - General Surgery following and agrees with conservative treatment  - Avoid narcotics  - Monitor

## 2018-08-04 NOTE — PROGRESS NOTES
Aroused to accu check.  Asking where am I, reoriented x 4 successfully.  Up to BSC with standby assistance.  Restraints removed and d/c'd.  Vitals stable.

## 2018-08-04 NOTE — SUBJECTIVE & OBJECTIVE
Past Medical History:   Diagnosis Date    Bladder cancer     Cancer     kidney    Diabetes mellitus     Fibromyalgia     History of kidney cancer     Hypertension     Lupus        Past Surgical History:   Procedure Laterality Date    COLON SURGERY      Bowel resection early 2018.       Review of patient's allergies indicates:   Allergen Reactions    Sulfa (sulfonamide antibiotics)      Throat closing       No current facility-administered medications on file prior to encounter.      No current outpatient prescriptions on file prior to encounter.     Family History     Unable to obtain due to patient's mental status on admission.        Social History Main Topics    Smoking status: Light Tobacco Smoker     Years: 25.00     Types: Cigarettes    Smokeless tobacco: Never Used    Alcohol use No    Drug use: No    Sexual activity: No     Review of Systems   Constitutional: Positive for chills. Negative for diaphoresis, fatigue, fever and unexpected weight change.   HENT: Negative for congestion, sore throat and trouble swallowing.    Respiratory: Negative for cough, shortness of breath and wheezing.    Cardiovascular: Negative for chest pain, palpitations and leg swelling.   Gastrointestinal: Positive for abdominal pain, nausea and vomiting. Negative for abdominal distention, anal bleeding, blood in stool, constipation, diarrhea and rectal pain.   Genitourinary: Negative for difficulty urinating, dysuria, frequency, hematuria and urgency.   Musculoskeletal: Negative for arthralgias, back pain and myalgias.   Skin: Negative for pallor, rash and wound.   Neurological: Negative for dizziness, syncope, weakness, light-headedness, numbness and headaches.   Psychiatric/Behavioral: Negative for confusion. The patient is not nervous/anxious.    All other systems reviewed and are negative.    Objective:     Vital Signs (Most Recent):  Temp: 98.6 °F (37 °C) (08/03/18 2027)  Pulse: 86 (08/04/18 0021)  Resp: (!) 22  (08/04/18 0021)  BP: (!) 163/85 (08/04/18 0021)  SpO2: 95 % (08/04/18 0021) Vital Signs (24h Range):  Temp:  [98.6 °F (37 °C)] 98.6 °F (37 °C)  Pulse:  [77-86] 86  Resp:  [22-25] 22  SpO2:  [95 %-99 %] 95 %  BP: (123-163)/(64-85) 163/85     Weight: 59.6 kg (131 lb 6.3 oz)  Body mass index is 24.83 kg/m².    Physical Exam   Constitutional: She is oriented to person, place, and time. She appears well-developed and well-nourished. She is easily aroused. No distress.   Drowsy due to IV analgesics given in ED.   HENT:   Head: Normocephalic and atraumatic.   NG tube in place.   Eyes: Conjunctivae are normal.   PERRL; EOM intact.   Neck: Normal range of motion. Neck supple.   Cardiovascular: Normal rate, regular rhythm, S1 normal, S2 normal and intact distal pulses.   No extrasystoles are present. Exam reveals no gallop and no friction rub.    No murmur heard.  Pulses:       Radial pulses are 2+ on the right side, and 2+ on the left side.        Dorsalis pedis pulses are 2+ on the right side, and 2+ on the left side.        Posterior tibial pulses are 2+ on the right side, and 2+ on the left side.   Pulmonary/Chest: Effort normal and breath sounds normal. No accessory muscle usage. No tachypnea. No respiratory distress. She has no wheezes. She has no rhonchi. She has no rales.   Abdominal: Soft. She exhibits no distension and no ascites. Bowel sounds are decreased. There is no hepatosplenomegaly. There is generalized tenderness. There is no rigidity, no rebound, no guarding, no CVA tenderness and negative Larose's sign.   NG tube to low wall suction.   Musculoskeletal: Normal range of motion. She exhibits no edema, tenderness or deformity.   Neurological: She is oriented to person, place, and time and easily aroused. No cranial nerve deficit or sensory deficit.   Drowsy due to IV analgesics given in ED, easily aroused.  No acute focal deficits appreciated.    Skin: Skin is warm, dry and intact. Capillary refill takes less  than 2 seconds. No rash noted. She is not diaphoretic. No cyanosis or erythema.   Psychiatric: Her speech is normal. Cognition and memory are normal.   Nursing note and vitals reviewed.          Significant Labs:   Results for orders placed or performed during the hospital encounter of 08/03/18   CBC W/ AUTO DIFFERENTIAL   Result Value Ref Range    WBC 9.86 3.90 - 12.70 K/uL    RBC 4.83 4.00 - 5.40 M/uL    Hemoglobin 12.5 12.0 - 16.0 g/dL    Hematocrit 39.6 37.0 - 48.5 %    MCV 82 82 - 98 fL    MCH 25.9 (L) 27.0 - 31.0 pg    MCHC 31.6 (L) 32.0 - 36.0 g/dL    RDW 17.9 (H) 11.5 - 14.5 %    Platelets 244 150 - 350 K/uL    MPV 11.2 9.2 - 12.9 fL    Gran # (ANC) 7.4 1.8 - 7.7 K/uL    Lymph # 1.1 1.0 - 4.8 K/uL    Mono # 1.0 0.3 - 1.0 K/uL    Eos # 0.3 0.0 - 0.5 K/uL    Baso # 0.02 0.00 - 0.20 K/uL    Gran% 75.5 (H) 38.0 - 73.0 %    Lymph% 11.3 (L) 18.0 - 48.0 %    Mono% 9.9 4.0 - 15.0 %    Eosinophil% 3.1 0.0 - 8.0 %    Basophil% 0.2 0.0 - 1.9 %    Differential Method Automated    Comp. Metabolic Panel   Result Value Ref Range    Sodium 141 136 - 145 mmol/L    Potassium 4.9 3.5 - 5.1 mmol/L    Chloride 106 95 - 110 mmol/L    CO2 21 (L) 23 - 29 mmol/L    Glucose 144 (H) 70 - 110 mg/dL    BUN, Bld 33 (H) 8 - 23 mg/dL    Creatinine 1.8 (H) 0.5 - 1.4 mg/dL    Calcium 9.8 8.7 - 10.5 mg/dL    Total Protein 7.7 6.0 - 8.4 g/dL    Albumin 3.8 3.5 - 5.2 g/dL    Total Bilirubin 0.5 0.1 - 1.0 mg/dL    Alkaline Phosphatase 88 55 - 135 U/L    AST 18 10 - 40 U/L    ALT 24 10 - 44 U/L    Anion Gap 14 8 - 16 mmol/L    eGFR if African American 32 (A) >60 mL/min/1.73 m^2    eGFR if non African American 28 (A) >60 mL/min/1.73 m^2   Lipase   Result Value Ref Range    Lipase 28 4 - 60 U/L   Urinalysis - Clean Catch   Result Value Ref Range    Specimen UA Urine, Clean Catch     Color, UA Yellow Yellow, Straw, Rena    Appearance, UA Clear Clear    pH, UA 6.0 5.0 - 8.0    Specific Gravity, UA 1.025 1.005 - 1.030    Protein, UA Trace (A)  Negative    Glucose, UA Negative Negative    Ketones, UA Negative Negative    Bilirubin (UA) Negative Negative    Occult Blood UA 1+ (A) Negative    Nitrite, UA Negative Negative    Urobilinogen, UA Negative <2.0 EU/dL    Leukocytes, UA Trace (A) Negative   Urinalysis Microscopic   Result Value Ref Range    RBC, UA 2 0 - 4 /hpf    WBC, UA 19 (H) 0 - 5 /hpf    WBC Clumps, UA Occasional (A) None-Rare    Microscopic Comment SEE COMMENT       All pertinent labs within the past 24 hours have been reviewed.    Significant Imaging:   Imaging Results          CT Abdomen Pelvis  Without Contrast (Final result)  Result time 08/03/18 22:46:23   Procedure changed from CT Abdomen Pelvis With Contrast     Final result by Isaiah Weiner MD (08/03/18 22:46:23)                 Impression:      Multiple loops of mildly dilated small bowel in the upper abdomen with possible transition in the right mid abdomen.  Findings suggestive of developing or partial small bowel obstruction.  Patient status post left nephrectomy.  Evidence of prior bowel surgery.    All CT scans at this facility are performed  using dose modulation techniques as appropriate to performed exam including the following:  automated exposure control; adjustment of mA and/or kV according to the patients size (this includes techniques or standardized protocols for targeted exams where dose is matched to indication/reason for exam: i.e. extremities or head);  iterative reconstruction technique.      Electronically signed by: Isaiah Weiner MD  Date:    08/03/2018  Time:    22:46             Narrative:    EXAMINATION:  CT ABDOMEN PELVIS WITHOUT CONTRAST    CLINICAL HISTORY:  Abd pain, fever, abscess suspected;    FINDINGS:  The lung bases are clear.    The liver and spleen are normal.    The gallbladder is normal. The pancreas is unremarkable.    No adrenal masses are identified.    The right kidney is normal.  Left kidney is been removed.  Surgical clips in the upper  abdomen.    Aortic and branch structure atherosclerosis.    Urinary bladder appears normal.    Multiple loops of mildly dilated fluid-filled small bowel noted involving the upper abdomen with transition in the mid abdomen suggesting partial or developing small bowel obstruction.    No acute osseous findings.                               X-Ray Chest AP Portable (Final result)  Result time 08/03/18 21:07:37    Final result by Vinh Lanier MD (08/03/18 21:07:37)                 Impression:      No acute process seen.      Electronically signed by: Vinh Lanier MD  Date:    08/03/2018  Time:    21:07             Narrative:    EXAMINATION:  XR CHEST AP PORTABLE    CLINICAL HISTORY:  epigastric pain;    FINDINGS:  Single view of the chest.  Aorta demonstrates atherosclerotic disease.    Cardiac silhouette is normal.  The lungs demonstrate no evidence of active disease.  No evidence of pleural effusion or pneumothorax.  Bones demonstrate mild degenerative change.                               I have reviewed all pertinent imaging results/findings within the past 24 hours.

## 2018-08-04 NOTE — CONSULTS
[facility] -   General Surgery  Consult Note    Patient Name: Su Callejas  MRN: [id number]  Code Status: Full Code  Admission Date: 8/3/2018  Hospital Length of Stay: 0 days  Attending Physician: Chely Goldstein MD  Primary Care Provider: Hasmukh Perdomo MD    Patient information was obtained from patient and ER records.     Consults  Subjective:     Principal Problem: Small bowel obstruction, partial    History of Present Illness: Su is 73 year-old white female patient with a history of left nephrectomy, began to notice worsening pain with nausea and vomiting. She was brought to ED for further evaluation and determined to have SBO, likely due to adhesion from previous surgery.    No current facility-administered medications on file prior to encounter.      No current outpatient prescriptions on file prior to encounter.       Review of patient's allergies indicates:   Allergen Reactions    Sulfa (sulfonamide antibiotics)      Throat closing       Past Medical History:   Diagnosis Date    Bladder cancer     Cancer     kidney    Diabetes mellitus     Fibromyalgia     History of kidney cancer     Hypertension     Lupus      Past Surgical History:   Procedure Laterality Date    COLON SURGERY      Bowel resection early 2018.     Family History     None        Social History Main Topics    Smoking status: Light Tobacco Smoker     Years: 25.00     Types: Cigarettes    Smokeless tobacco: Never Used    Alcohol use No    Drug use: No    Sexual activity: No     Review of Systems   Constitutional: Positive for activity change and appetite change. Negative for chills and fever.   HENT: Negative for sore throat and trouble swallowing.    Eyes: Negative.    Respiratory: Negative for cough and shortness of breath.    Cardiovascular: Negative.    Gastrointestinal: Positive for abdominal pain, nausea and vomiting. Negative for abdominal distention.   Endocrine: Negative.    Genitourinary:  Negative.    Musculoskeletal: Negative.    Skin: Negative.    Allergic/Immunologic: Negative.    Neurological: Negative.    Hematological: Does not bruise/bleed easily.   Psychiatric/Behavioral: Negative.      Objective:     Vital Signs (Most Recent):  Temp: 98.7 °F (37.1 °C) (08/04/18 0730)  Pulse: 83 (08/04/18 0730)  Resp: 18 (08/04/18 0730)  BP: (!) 141/63 (08/04/18 0730)  SpO2: (!) 94 % (08/04/18 0818) Vital Signs (24h Range):  Temp:  [98.5 °F (36.9 °C)-99.4 °F (37.4 °C)] 98.7 °F (37.1 °C)  Pulse:  [] 83  Resp:  [6-25] 18  SpO2:  [94 %-99 %] 94 %  BP: (123-175)/(63-91) 141/63     Weight: 59.6 kg (131 lb 6.3 oz)  Body mass index is 24.83 kg/m².    Physical Exam   Constitutional: She is oriented to person, place, and time. She appears well-developed and well-nourished.   HENT:   Head: Normocephalic.   Right Ear: External ear normal.   Left Ear: External ear normal.   Nose: Nose normal.   Eyes: Pupils are equal, round, and reactive to light. No scleral icterus.   Neck: Normal range of motion. Neck supple. No thyromegaly present.   Cardiovascular: Normal rate, regular rhythm and normal heart sounds.    No murmur heard.  Pulmonary/Chest: Effort normal and breath sounds normal.   Abdominal: Soft. She exhibits no distension. There is tenderness. There is no guarding.   diminished sound   Musculoskeletal: Normal range of motion.   Lymphadenopathy:     She has no cervical adenopathy.   Neurological: She is alert and oriented to person, place, and time.   Skin: Skin is warm and dry.       Significant Labs:  CBC:   Recent Labs  Lab 08/04/18  0435   WBC 5.81   RBC 4.35   HGB 11.1*   HCT 36.3*      MCV 83   MCH 25.5*   MCHC 30.6*     CMP:   Recent Labs  Lab 08/03/18 2118 08/04/18  0435   * 100   CALCIUM 9.8 9.0   ALBUMIN 3.8  --    PROT 7.7  --     142   K 4.9 5.1   CO2 21* 22*    109   BUN 33* 34*   CREATININE 1.8* 1.8*   ALKPHOS 88  --    ALT 24  --    AST 18  --    BILITOT 0.5  --         Significant Diagnostics:  I have reviewed and interpreted all pertinent imaging results/findings within the past 24 hours.    Assessment/Plan:     * Small bowel obstruction, partial    Conservative management with hydration and ambulation. Will continue to monitore.          VTE Risk Mitigation         Ordered     Place sequential compression device  Until discontinued      08/04/18 0321     IP VTE HIGH RISK PATIENT  Once      08/04/18 0321     Reason for No Pharmacological VTE Prophylaxis  Once      08/04/18 0321          Thank you for your consult. I will follow-up with patient. Please contact us if you have any additional questions.    Ezequiel Tate MD  General Surgery  [facility]

## 2018-08-04 NOTE — ASSESSMENT & PLAN NOTE
- Likely due to dehydration  - Cr currently 1.8  - Baseline Cr unknown  - IV hydration, strict I&O  - Avoid nephrotoxic agents  - Follow daily BMP

## 2018-08-04 NOTE — HPI
Su is 73 year-old white female patient with a history of left nephrectomy, began to notice worsening pain with nausea and vomiting. She was brought to ED for further evaluation and determined to have SBO, likely due to adhesion from previous surgery.

## 2018-08-04 NOTE — H&P
[facility] Hospital Medicine  History & Physical    Patient Name: Su Callejas  MRN: [id number]  Admission Date: 8/4/2018  Attending Physician: Yann Jay, *   Primary Care Provider: Hasmukh Perdomo MD         Patient information was obtained from patient, relative(s) and ER records.     Subjective:     Principal Problem:Small bowel obstruction, partial    Chief Complaint:   Chief Complaint   Patient presents with    Abdominal Pain     with n/v. Currently being treated for bladder cancer        HPI: Ms. Callejas is a 73-year-old female with a past medical history of kidney cancer with remote left nephrectomy, bladder cancer with recent chemotherapy treatment, type 2 diabetes, hypertension, lupus, fibromyalgia, and history of bowel resection in early 2017.  She presented to the emergency department with complaints of generalized abdominal pain that has progressively worsened throughout the day today.  Associated nausea, vomiting, and chills.  No aggravating or alleviating factors.  Denies any abdominal distention, diarrhea, constipation, dysuria, hematuria, hematemesis, melena, hematochezia, chest pain, palpitations, shortness breath, cough, orthopnea, edema, lightheadedness or dizziness, syncope, headache, altered mental status, or fever.  Patient states she was seen in an ED three weeks ago for abdominal pain and was diagnosed with constipation.  She states current abdominal pain is different from the pain she had three weeks ago.  She recently moved to [place] from [place] three weeks ago due to emergent family issues.  She was under the care of an oncologist and urologist in [place], and was undergoing chemotherapy treatment at the time of her move, last treatment was four weeks ago.  She completed four weeks of chemotherapy, but treatment plan was to complete six weeks.  She has not established care with a local oncologist or urologist since moving from [place].  Workup in  ED resulted creatinine 1.8, BUN 33.  CT of ABD/pelvis showed multiple loops of mildly dilated small bowel in the upper abdomen with possible transition in the right mid abdomen suggestive of developing or partial small bowel obstruction.  In the ED, patient received IV Dilaudid, IV antiemetics, and IV fluids.  Hospital Medicine was called for admission.  Currently patient appears comfortable in no acute distress, she is drowsy from recent IV analgesics given in ED.        Past Medical History:   Diagnosis Date    Bladder cancer     Cancer     kidney    Diabetes mellitus     Fibromyalgia     History of kidney cancer     Hypertension     Lupus        Past Surgical History:   Procedure Laterality Date    COLON SURGERY      Bowel resection early 2018.       Review of patient's allergies indicates:   Allergen Reactions    Sulfa (sulfonamide antibiotics)      Throat closing       No current facility-administered medications on file prior to encounter.      No current outpatient prescriptions on file prior to encounter.     Family History     Unable to obtain due to patient's mental status on admission.        Social History Main Topics    Smoking status: Light Tobacco Smoker     Years: 25.00     Types: Cigarettes    Smokeless tobacco: Never Used    Alcohol use No    Drug use: No    Sexual activity: No     Review of Systems   Constitutional: Positive for chills. Negative for diaphoresis, fatigue, fever and unexpected weight change.   HENT: Negative for congestion, sore throat and trouble swallowing.    Respiratory: Negative for cough, shortness of breath and wheezing.    Cardiovascular: Negative for chest pain, palpitations and leg swelling.   Gastrointestinal: Positive for abdominal pain, nausea and vomiting. Negative for abdominal distention, anal bleeding, blood in stool, constipation, diarrhea and rectal pain.   Genitourinary: Negative for difficulty urinating, dysuria, frequency, hematuria and urgency.    Musculoskeletal: Negative for arthralgias, back pain and myalgias.   Skin: Negative for pallor, rash and wound.   Neurological: Negative for dizziness, syncope, weakness, light-headedness, numbness and headaches.   Psychiatric/Behavioral: Negative for confusion. The patient is not nervous/anxious.    All other systems reviewed and are negative.    Objective:     Vital Signs (Most Recent):  Temp: 98.6 °F (37 °C) (08/03/18 2027)  Pulse: 86 (08/04/18 0021)  Resp: (!) 22 (08/04/18 0021)  BP: (!) 163/85 (08/04/18 0021)  SpO2: 95 % (08/04/18 0021) Vital Signs (24h Range):  Temp:  [98.6 °F (37 °C)] 98.6 °F (37 °C)  Pulse:  [77-86] 86  Resp:  [22-25] 22  SpO2:  [95 %-99 %] 95 %  BP: (123-163)/(64-85) 163/85     Weight: 59.6 kg (131 lb 6.3 oz)  Body mass index is 24.83 kg/m².    Physical Exam   Constitutional: She is oriented to person, place, and time. She appears well-developed and well-nourished. She is easily aroused. No distress.   Drowsy due to IV analgesics given in ED.   HENT:   Head: Normocephalic and atraumatic.   NG tube in place.   Eyes: Conjunctivae are normal.   PERRL; EOM intact.   Neck: Normal range of motion. Neck supple.   Cardiovascular: Normal rate, regular rhythm, S1 normal, S2 normal and intact distal pulses.   No extrasystoles are present. Exam reveals no gallop and no friction rub.    No murmur heard.  Pulses:       Radial pulses are 2+ on the right side, and 2+ on the left side.        Dorsalis pedis pulses are 2+ on the right side, and 2+ on the left side.        Posterior tibial pulses are 2+ on the right side, and 2+ on the left side.   Pulmonary/Chest: Effort normal and breath sounds normal. No accessory muscle usage. No tachypnea. No respiratory distress. She has no wheezes. She has no rhonchi. She has no rales.   Abdominal: Soft. She exhibits no distension and no ascites. Bowel sounds are decreased. There is no hepatosplenomegaly. There is generalized tenderness. There is no rigidity, no  rebound, no guarding, no CVA tenderness and negative Larose's sign.   NG tube to low wall suction.   Musculoskeletal: Normal range of motion. She exhibits no edema, tenderness or deformity.   Neurological: She is oriented to person, place, and time and easily aroused. No cranial nerve deficit or sensory deficit.   Drowsy due to IV analgesics given in ED, easily aroused.  No acute focal deficits appreciated.    Skin: Skin is warm, dry and intact. Capillary refill takes less than 2 seconds. No rash noted. She is not diaphoretic. No cyanosis or erythema.   Psychiatric: Her speech is normal. Cognition and memory are normal.   Nursing note and vitals reviewed.          Significant Labs:   Results for orders placed or performed during the hospital encounter of 08/03/18   CBC W/ AUTO DIFFERENTIAL   Result Value Ref Range    WBC 9.86 3.90 - 12.70 K/uL    RBC 4.83 4.00 - 5.40 M/uL    Hemoglobin 12.5 12.0 - 16.0 g/dL    Hematocrit 39.6 37.0 - 48.5 %    MCV 82 82 - 98 fL    MCH 25.9 (L) 27.0 - 31.0 pg    MCHC 31.6 (L) 32.0 - 36.0 g/dL    RDW 17.9 (H) 11.5 - 14.5 %    Platelets 244 150 - 350 K/uL    MPV 11.2 9.2 - 12.9 fL    Gran # (ANC) 7.4 1.8 - 7.7 K/uL    Lymph # 1.1 1.0 - 4.8 K/uL    Mono # 1.0 0.3 - 1.0 K/uL    Eos # 0.3 0.0 - 0.5 K/uL    Baso # 0.02 0.00 - 0.20 K/uL    Gran% 75.5 (H) 38.0 - 73.0 %    Lymph% 11.3 (L) 18.0 - 48.0 %    Mono% 9.9 4.0 - 15.0 %    Eosinophil% 3.1 0.0 - 8.0 %    Basophil% 0.2 0.0 - 1.9 %    Differential Method Automated    Comp. Metabolic Panel   Result Value Ref Range    Sodium 141 136 - 145 mmol/L    Potassium 4.9 3.5 - 5.1 mmol/L    Chloride 106 95 - 110 mmol/L    CO2 21 (L) 23 - 29 mmol/L    Glucose 144 (H) 70 - 110 mg/dL    BUN, Bld 33 (H) 8 - 23 mg/dL    Creatinine 1.8 (H) 0.5 - 1.4 mg/dL    Calcium 9.8 8.7 - 10.5 mg/dL    Total Protein 7.7 6.0 - 8.4 g/dL    Albumin 3.8 3.5 - 5.2 g/dL    Total Bilirubin 0.5 0.1 - 1.0 mg/dL    Alkaline Phosphatase 88 55 - 135 U/L    AST 18 10 - 40 U/L     ALT 24 10 - 44 U/L    Anion Gap 14 8 - 16 mmol/L    eGFR if African American 32 (A) >60 mL/min/1.73 m^2    eGFR if non African American 28 (A) >60 mL/min/1.73 m^2   Lipase   Result Value Ref Range    Lipase 28 4 - 60 U/L   Urinalysis - Clean Catch   Result Value Ref Range    Specimen UA Urine, Clean Catch     Color, UA Yellow Yellow, Straw, Rena    Appearance, UA Clear Clear    pH, UA 6.0 5.0 - 8.0    Specific Gravity, UA 1.025 1.005 - 1.030    Protein, UA Trace (A) Negative    Glucose, UA Negative Negative    Ketones, UA Negative Negative    Bilirubin (UA) Negative Negative    Occult Blood UA 1+ (A) Negative    Nitrite, UA Negative Negative    Urobilinogen, UA Negative <2.0 EU/dL    Leukocytes, UA Trace (A) Negative   Urinalysis Microscopic   Result Value Ref Range    RBC, UA 2 0 - 4 /hpf    WBC, UA 19 (H) 0 - 5 /hpf    WBC Clumps, UA Occasional (A) None-Rare    Microscopic Comment SEE COMMENT       All pertinent labs within the past 24 hours have been reviewed.    Significant Imaging:   Imaging Results          CT Abdomen Pelvis  Without Contrast (Final result)  Result time 08/03/18 22:46:23   Procedure changed from CT Abdomen Pelvis With Contrast     Final result by Isaiah Weiner MD (08/03/18 22:46:23)                 Impression:      Multiple loops of mildly dilated small bowel in the upper abdomen with possible transition in the right mid abdomen.  Findings suggestive of developing or partial small bowel obstruction.  Patient status post left nephrectomy.  Evidence of prior bowel surgery.    All CT scans at this facility are performed  using dose modulation techniques as appropriate to performed exam including the following:  automated exposure control; adjustment of mA and/or kV according to the patients size (this includes techniques or standardized protocols for targeted exams where dose is matched to indication/reason for exam: i.e. extremities or head);  iterative reconstruction  technique.      Electronically signed by: Isaiah Weiner MD  Date:    08/03/2018  Time:    22:46             Narrative:    EXAMINATION:  CT ABDOMEN PELVIS WITHOUT CONTRAST    CLINICAL HISTORY:  Abd pain, fever, abscess suspected;    FINDINGS:  The lung bases are clear.    The liver and spleen are normal.    The gallbladder is normal. The pancreas is unremarkable.    No adrenal masses are identified.    The right kidney is normal.  Left kidney is been removed.  Surgical clips in the upper abdomen.    Aortic and branch structure atherosclerosis.    Urinary bladder appears normal.    Multiple loops of mildly dilated fluid-filled small bowel noted involving the upper abdomen with transition in the mid abdomen suggesting partial or developing small bowel obstruction.    No acute osseous findings.                               X-Ray Chest AP Portable (Final result)  Result time 08/03/18 21:07:37    Final result by Vinh Lanier MD (08/03/18 21:07:37)                 Impression:      No acute process seen.      Electronically signed by: Vinh Lanier MD  Date:    08/03/2018  Time:    21:07             Narrative:    EXAMINATION:  XR CHEST AP PORTABLE    CLINICAL HISTORY:  epigastric pain;    FINDINGS:  Single view of the chest.  Aorta demonstrates atherosclerotic disease.    Cardiac silhouette is normal.  The lungs demonstrate no evidence of active disease.  No evidence of pleural effusion or pneumothorax.  Bones demonstrate mild degenerative change.                               I have reviewed all pertinent imaging results/findings within the past 24 hours.          Assessment/Plan:     * Small bowel obstruction, partial    - CT ABD/pelvis showed multiple loops of mildly dilated fluid-filled small bowel noted involving the upper abdomen with transition in the mid abdomen suggesting partial or developing small bowel obstruction.  - Bowel rest with NG tube to intermittent low wall suction.  - IV hydration.  - PRN  antiemetics and analgesics.  - General Surgery consult in AM.  - F/U ABD XR.        RYDER (acute kidney injury)    - Likely due to dehydration.  - IV hydration, strict I&O's.  - Follow daily BMP.        Essential hypertension    - BP stable.  - PRN IV hydralazine.        Type 2 diabetes mellitus    - Check HbA1c.  - Accuchecks with hypoglycemic protocol while NPO.  Add SSI as needed.  - Will add dextrose to IVF's if needed while NPO.        History of kidney cancer with remote unilateral nephrectomy     - Follow kidney function closely.  - Avoid nephrotoxic agents.         Bladder cancer    - Last chemo 4 weeks ago.  - Will need to establish care with Urology and Oncology.          VTE Risk Mitigation         Ordered     Place sequential compression device  Until discontinued      08/04/18 0321     IP VTE HIGH RISK PATIENT  Once      08/04/18 0321     Reason for No Pharmacological VTE Prophylaxis  Once      08/04/18 0321             Palak Walsh NP  Department of Hospital Medicine   [facility]

## 2018-08-04 NOTE — SUBJECTIVE & OBJECTIVE
No current facility-administered medications on file prior to encounter.      No current outpatient prescriptions on file prior to encounter.       Review of patient's allergies indicates:   Allergen Reactions    Sulfa (sulfonamide antibiotics)      Throat closing       Past Medical History:   Diagnosis Date    Bladder cancer     Cancer     kidney    Diabetes mellitus     Fibromyalgia     History of kidney cancer     Hypertension     Lupus      Past Surgical History:   Procedure Laterality Date    COLON SURGERY      Bowel resection early 2018.     Family History     None        Social History Main Topics    Smoking status: Light Tobacco Smoker     Years: 25.00     Types: Cigarettes    Smokeless tobacco: Never Used    Alcohol use No    Drug use: No    Sexual activity: No     Review of Systems   Constitutional: Positive for activity change and appetite change. Negative for chills and fever.   HENT: Negative for sore throat and trouble swallowing.    Eyes: Negative.    Respiratory: Negative for cough and shortness of breath.    Cardiovascular: Negative.    Gastrointestinal: Positive for abdominal pain, nausea and vomiting. Negative for abdominal distention.   Endocrine: Negative.    Genitourinary: Negative.    Musculoskeletal: Negative.    Skin: Negative.    Allergic/Immunologic: Negative.    Neurological: Negative.    Hematological: Does not bruise/bleed easily.   Psychiatric/Behavioral: Negative.      Objective:     Vital Signs (Most Recent):  Temp: 98.7 °F (37.1 °C) (08/04/18 0730)  Pulse: 83 (08/04/18 0730)  Resp: 18 (08/04/18 0730)  BP: (!) 141/63 (08/04/18 0730)  SpO2: (!) 94 % (08/04/18 0818) Vital Signs (24h Range):  Temp:  [98.5 °F (36.9 °C)-99.4 °F (37.4 °C)] 98.7 °F (37.1 °C)  Pulse:  [] 83  Resp:  [6-25] 18  SpO2:  [94 %-99 %] 94 %  BP: (123-175)/(63-91) 141/63     Weight: 59.6 kg (131 lb 6.3 oz)  Body mass index is 24.83 kg/m².    Physical Exam   Constitutional: She is oriented to  person, place, and time. She appears well-developed and well-nourished.   HENT:   Head: Normocephalic.   Right Ear: External ear normal.   Left Ear: External ear normal.   Nose: Nose normal.   Eyes: Pupils are equal, round, and reactive to light. No scleral icterus.   Neck: Normal range of motion. Neck supple. No thyromegaly present.   Cardiovascular: Normal rate, regular rhythm and normal heart sounds.    No murmur heard.  Pulmonary/Chest: Effort normal and breath sounds normal.   Abdominal: Soft. She exhibits no distension. There is tenderness. There is no guarding.   diminished sound   Musculoskeletal: Normal range of motion.   Lymphadenopathy:     She has no cervical adenopathy.   Neurological: She is alert and oriented to person, place, and time.   Skin: Skin is warm and dry.       Significant Labs:  CBC:   Recent Labs  Lab 08/04/18  0435   WBC 5.81   RBC 4.35   HGB 11.1*   HCT 36.3*      MCV 83   MCH 25.5*   MCHC 30.6*     CMP:   Recent Labs  Lab 08/03/18  2118 08/04/18  0435   * 100   CALCIUM 9.8 9.0   ALBUMIN 3.8  --    PROT 7.7  --     142   K 4.9 5.1   CO2 21* 22*    109   BUN 33* 34*   CREATININE 1.8* 1.8*   ALKPHOS 88  --    ALT 24  --    AST 18  --    BILITOT 0.5  --        Significant Diagnostics:  I have reviewed and interpreted all pertinent imaging results/findings within the past 24 hours.

## 2018-08-04 NOTE — CONSULTS
"  [facility]   Adult Nutrition  Consult Note    SUMMARY     Recommendations  Recommendation/Intervention: 1.) Recommend Clinimix /15 @ goal rate 55 mls/hr continuous + 20% lipids daily (250mls) providing 1437 kcals/day, 66 g protein/day, and 1320 mls fluid/day. GIR: 2.3 mg/kg/min.   Goals: 1.) patient will meet >80% of EEN while admitted  Nutrition Goal Status: new  Communication of RD Recs:  (sticky note)     1. Small bowel obstruction, partial    2. Encounter for nasogastric (NG) tube placement      Past Medical History:   Diagnosis Date    Bladder cancer     Cancer     kidney    Diabetes mellitus     Fibromyalgia     History of kidney cancer     Hypertension     Lupus          Reason for Assessment  Reason for Assessment: consult  General Information Comments: Admits with SBO. PMH of bladder cancer. Significant weight loss noted per chart. On site RD to follow up with physical assessment. Remains NPO.     Nutrition Risk Screen  Nutrition Risk Screen: other (see comments) (recent chemotherapy)    Nutrition/Diet History  Do you have any cultural, spiritual, Yarsani conflicts, given your current situation?: none  Food Allergies: NKFA  Factors Affecting Nutritional Intake: abdominal pain, NPO    Anthropometrics  Temp: 97.9 °F (36.6 °C)  Height Method: Stated  Height: 5' 1"  Height (inches): 61 in  Weight Method: Bed Scale  Weight: 59.6 kg (131 lb 6.3 oz)  Weight (lb): 131.4 lb  Ideal Body Weight (IBW), Female: 105 lb  % Ideal Body Weight, Female (lb): 125.14 lb  BMI (Calculated): 24.9  BMI Grade: 18.5-24.9 - normal  Weight Loss: unintentional  Usual Body Weight (UBW), k kg  % Usual Body Weight: 79.63  % Weight Change From Usual Weight: -20.53 %    Lab/Procedures/Meds  Pertinent Labs Reviewed: reviewed  BMP  Lab Results   Component Value Date     2018    K 5.1 2018     2018    CO2 22 (L) 2018    BUN 34 (H) 2018    CREATININE 1.8 (H) 2018    " CALCIUM 9.0 08/04/2018    ANIONGAP 11 08/04/2018    ESTGFRAFRICA 32 (A) 08/04/2018    EGFRNONAA 28 (A) 08/04/2018     Lab Results   Component Value Date    ALBUMIN 3.8 08/03/2018     Lab Results   Component Value Date    CALCIUM 9.0 08/04/2018    PHOS 4.4 08/04/2018       Recent Labs  Lab 08/04/18  1107   POCTGLUCOSE 122*       Pertinent Medications Reviewed: reviewed  Scheduled Meds:   pantoprazole 40 mg in dextrose 5 % 100 mL IVPB (over 15 minutes) (ready to mix system)  40 mg Intravenous Daily     Continuous Infusions:   dextrose 5 % and 0.9 % NaCl 125 mL/hr at 08/04/18 0624         Physical Findings/Assessment  Overall Physical Appearance:  (frank)  Skin:  (maynor score 18)    Estimated/Assessed Needs  Weight Used For Calorie Calculations: 59.6 kg (131 lb 6.3 oz)  Energy Calorie Requirements (kcal): 5814-2041  Energy Need Method: [place]-St Jeor (1.3)  Protein Requirements: 59-71  Weight Used For Protein Calculations: 59.6 kg (131 lb 6.3 oz) (1-1.2)  Fluid Need Method: RDA Method (or per MD)  RDA Method (mL): 1400      Nutrition Prescription Ordered  Current Diet Order: NPO    Evaluation of Received Nutrient/Fluid Intake  Other Calories (kcal): 510  Energy Calories Required: not meeting needs  Protein Required: not meeting needs  % Intake of Estimated Energy Needs: 0 - 25 %  % Meal Intake: NPO    Nutrition Risk  -    Assessment and Plan  Nutrition Problem  Altered GI Function    Related to (etiology):   SBO    Signs and Symptoms (as evidenced by):   NPO status    Interventions/Recommendations (treatment strategy):  Initiate TPN    Nutrition Diagnosis Status:   New             Monitor and Evaluation  Food and Nutrient Intake: energy intake, parenteral nutrition intake  Food and Nutrient Adminstration: enteral and parenteral nutrition administration  Anthropometric Measurements: weight, weight change, body mass index  Biochemical Data, Medical Tests and Procedures: electrolyte and renal panel, glucose/endocrine  profile, lipid profile  Nutrition-Focused Physical Findings: overall appearance     Nutrition Follow-Up  RD Follow-up?: Yes

## 2018-08-04 NOTE — ASSESSMENT & PLAN NOTE
- CT ABD/pelvis showed multiple loops of mildly dilated fluid-filled small bowel noted involving the upper abdomen with transition in the mid abdomen suggesting partial or developing small bowel obstruction.  - Bowel rest with NG tube to intermittent low wall suction.  - IV hydration.  - PRN antiemetics and analgesics.  - General Surgery consult in AM.  - F/U ABD XR.

## 2018-08-04 NOTE — PLAN OF CARE
Problem: Patient Care Overview  Goal: Plan of Care Review  Outcome: Ongoing (interventions implemented as appropriate)  Pt AAO x4.  VSS.  Pt remained afebrile throughout this shift.   IV fluids administered per order.   Pt remained free of falls this shift.   Pt complained of throat pain this shift.  Plan of care reviewed. Patient verbalizes understanding.   Pain meds administered as ordered.   Patient complained of itching and nausea this shift. PRN meds administered as ordered.  Pt moving/turing standby assist.   Patient NSR on monitor.   Bed low, side rails up x 2, wheels locked, call light in reach.   Patient instructed to call for assistance.   Hourly rounding completed.   Will continue to monitor.

## 2018-08-04 NOTE — PLAN OF CARE
Problem: Nutrition, Parenteral (Adult)  Goal: Signs and Symptoms of Listed Potential Problems Will be Absent, Minimized or Managed (Nutrition, Parenteral)  Signs and symptoms of listed potential problems will be absent, minimized or managed by discharge/transition of care (reference Nutrition, Parenteral (Adult) CPG).  Outcome: Ongoing (interventions implemented as appropriate)  Recommendations  Recommendation/Intervention: 1.) Recommend Clinimix 5/15 @ goal rate 55 mls/hr continuous + 20% lipids daily (250mls) providing 1437 kcals/day, 66 g protein/day, and 1320 mls fluid/day. GIR: 2.3 mg/kg/min.   Goals: 1.) patient will meet >80% of EEN while admitted  Nutrition Goal Status: new  Communication of RD Recs:  (sticky note)

## 2018-08-04 NOTE — ASSESSMENT & PLAN NOTE
- Check HbA1c.  - Accuchecks with hypoglycemic protocol while NPO.  Add SSI as needed.  - Will add dextrose to IVF's if needed while NPO.

## 2018-08-04 NOTE — PROGRESS NOTES
Admitted to OBS rm. 233 from ER.  Bedside report per Miriam SANCHES.  Son Ray accompanied patient from ER.  Son physically restraining patients arms to keep from pulling out NGT.  Is lethargic from recent pain meds - see MAR.  Arouses to name.  Briefly opens eyes.  Orients to self only.  Is Agitated and restless whenever awake.  Tries to get OOB and remove medical equipment.  Son requests Bilat. Soft wrist restraints for safety.  NP C. Buuck phoned - new orders noted.    RR 14 bpm.  BBS CTA.  On 3L nasal cannula.  Abd. Soft - tender to palpation.  Old scar midline abd.  Son says had bowel resection earlier this year.  BS absent x 4 quads.  Skin wnl.  IVF's infusing.  L AC IV site wnl.  NGT to LIWS.  Small amt. Of yellow drainage noted.  Son to go home, will return ASAP.

## 2018-08-04 NOTE — PLAN OF CARE
Sw met with pt and son at bedside to complete assessment. Sw explained role/purpose of visit. Transitional navigator was provided to pt. Pt reports she used a walker when she was in [place] but haven't been using one since she has relocated to [place]. Pt's son asked about Home Health. Sw provided pt a list of HH companies. Pt chose [facility] and [facility] second. Pt's choice form will be placed in record. Sw will send referral through [employer]. No further needs at this time.      08/04/18 0207   Discharge Assessment   Assessment Type Discharge Planning Assessment   Confirmed/corrected address and phone number on facesheet? Yes   Assessment information obtained from? Caregiver   Prior to hospitilization cognitive status: Alert/Oriented   Prior to hospitalization functional status: Independent   Current cognitive status: Alert/Oriented   Current Functional Status: Assistive Equipment   Lives With child(marybel), adult   Able to Return to Prior Arrangements yes   Is patient able to care for self after discharge? Unable to determine at this time (comments)   Who are your caregiver(s) and their phone number(s)? Oumar Braswell, son, [phone]   Patient's perception of discharge disposition home or selfcare;home health   Readmission Within The Last 30 Days no previous admission in last 30 days   Patient currently being followed by outpatient case management? No   Patient currently receives any other outside agency services? No   Equipment Currently Used at Home walker, rolling  ([place] [employer])   Do you have any problems affording any of your prescribed medications? No   Is the patient taking medications as prescribed? yes   Does the patient have transportation home? Yes  (Pt's son will provide dc transportation.)   Transportation Available family or friend will provide   Does the patient receive services at the [facility] Clinic? No   Discharge Plan A Home with family;Home Health   Discharge Plan B Home with family   Patient/Family In  Agreement With Plan yes   Readmission Questionnaire   Have you felt down, depressed, or hopeless? 1

## 2018-08-04 NOTE — HOSPITAL COURSE
"On 8/3 patient was admitted to [facility] secondary to a Partial SBO.  CT ABD/pelvis showed multiple loops of mildly dilated fluid-filled small bowel noted involving the upper abdomen with transition in the mid abdomen suggesting partial or developing small bowel obstruction.  Patient was placed NPO and an NGT was placed to low wall suction.  IVFs started.  As of 8/4 patient has been evaluated by Dr. Tate with general surgery who agrees with conservative treatment with ambulation and IV hydration. Patient has a h/o right nephrectomy in 2015 secondary to h/o kidney cancer.  Cr currently 1.8.  Baseline unknown as patient has recently moved from [place].  Also has a h/o bladder cancer and last reported chemo approximately 4 weeks ago.  Will need to establish outpatient f/u upon DC.  Patient reports being started on Keflex by PCP yesterday for a "bladder infection".  UA negative for nitrites, trace leukocytes.  UC pending.  Hold off on ABX for now.  As of 8/5 patient pulled out NGT yesterday and refused to have it replaced.  She has ambulated in the hallway and reports having a formed BM this morning.  Also reports passing flatus.  She is currently denying abdominal pain, nausea, and vomiting.  BS are normoactive.  No abdominal tenderness noted on exam.  She tolerated a CL diet this morning without difficulty.  Cr improved to 1.3 with IVFs (baseline unknown).  Patient evaluated by alyssa Orlando to [place] home from a surgical standpoint today.  Case d/w Dr. Goldstein.  Patient seen and examined and deemed medically stable to [facility] today.   Per her son who was present at the BS yesterday her PCP Dr. Perdomo is arranging outpatient urology and oncology follow-ups.  Patient and son are aware of the need to f/u ASAP.  She was instructed to f/u with Dr. Perdomo within 3 days for a post hospital evaluation and verbalized + understanding.  UC pending.  Will f/u and notify patient if antibiotics are required.  Medications reconciled for DC " home.

## 2018-08-04 NOTE — HPI
Ms. Callejas is a 73-year-old female with a past medical history of kidney cancer with remote left nephrectomy, bladder cancer with recent chemotherapy treatment, type 2 diabetes, hypertension, lupus, fibromyalgia, and history of bowel resection in early 2017.  She presented to the emergency department with complaints of generalized abdominal pain that has progressively worsened throughout the day today.  Associated nausea, vomiting, and chills.  No aggravating or alleviating factors.  Denies any abdominal distention, diarrhea, constipation, dysuria, hematuria, hematemesis, melena, hematochezia, chest pain, palpitations, shortness breath, cough, orthopnea, edema, lightheadedness or dizziness, syncope, headache, altered mental status, or fever.  Patient states she was seen in an ED three weeks ago for abdominal pain and was diagnosed with constipation.  She states current abdominal pain is different from the pain she had three weeks ago.  She recently moved to [place] from [place] three weeks ago due to emergent family issues.  She was under the care of an oncologist and urologist in [place], and was undergoing chemotherapy treatment at the time of her move, last treatment was four weeks ago.  She completed four weeks of chemotherapy, but treatment plan was to complete six weeks.  She has not established care with a local oncologist or urologist since moving from [place].  Workup in ED resulted creatinine 1.8, BUN 33.  CT of ABD/pelvis showed multiple loops of mildly dilated small bowel in the upper abdomen with possible transition in the right mid abdomen suggestive of developing or partial small bowel obstruction.  In the ED, patient received IV Dilaudid, IV antiemetics, and IV fluids.  Hospital Medicine was called for admission.  Currently patient appears comfortable in no acute distress, she is drowsy from recent IV analgesics given in ED.

## 2018-08-04 NOTE — CONSULTS
[facility]  General Surgery  Consult Note    Patient Name: Su Callejas  MRN: [id number]  Code Status: Full Code  Admission Date: 8/3/2018  Hospital Length of Stay: 0 days  Attending Physician: Chely Goldstein MD  Primary Care Provider: Hasmukh Perdomo MD    Patient information was obtained from patient and ER records.     Inpatient consult to General Surgery  Consult performed by: EZEQUIEL TATE  Consult ordered by: HENRY LINDO        Subjective:     Principal Problem: Small bowel obstruction, partial    History of Present Illness: Su is 73 year-old white female patient with a history of left nephrectomy, began to notice worsening pain with nausea and vomiting. She was brought to ED for further evaluation and determined to have SBO, likely due to adhesion from previous surgery.    No new subjective & objective note has been filed under this hospital service since the last note was generated.    Assessment/Plan:     * Small bowel obstruction, partial    Conservative management with hydration and ambulation. Will continue to monitore.          VTE Risk Mitigation         Ordered     Place sequential compression device  Until discontinued      08/04/18 0321     IP VTE HIGH RISK PATIENT  Once      08/04/18 0321     Reason for No Pharmacological VTE Prophylaxis  Once      08/04/18 0321          Thank you for your consult. I will follow-up with patient. Please contact us if you have any additional questions.    Ezequiel Tate MD  General Surgery  [facility]

## 2018-08-04 NOTE — ASSESSMENT & PLAN NOTE
- HbA1c 5.6  - Accuchecks with hypoglycemic protocol while NPO.  Add SSI as needed.  - Will add dextrose to IVF's if needed while NPO.

## 2018-08-05 VITALS
WEIGHT: 131.38 LBS | RESPIRATION RATE: 18 BRPM | HEIGHT: 61 IN | BODY MASS INDEX: 24.8 KG/M2 | TEMPERATURE: 98 F | OXYGEN SATURATION: 95 % | DIASTOLIC BLOOD PRESSURE: 62 MMHG | SYSTOLIC BLOOD PRESSURE: 130 MMHG | HEART RATE: 72 BPM

## 2018-08-05 PROBLEM — K56.600 SMALL BOWEL OBSTRUCTION, PARTIAL: Status: RESOLVED | Noted: 2018-08-04 | Resolved: 2018-08-05

## 2018-08-05 PROBLEM — N17.9 AKI (ACUTE KIDNEY INJURY): Status: RESOLVED | Noted: 2018-08-04 | Resolved: 2018-08-05

## 2018-08-05 LAB
ANION GAP SERPL CALC-SCNC: 9 MMOL/L
BASOPHILS # BLD AUTO: 0.01 K/UL
BASOPHILS NFR BLD: 0.2 %
BUN SERPL-MCNC: 21 MG/DL
CALCIUM SERPL-MCNC: 8.6 MG/DL
CHLORIDE SERPL-SCNC: 112 MMOL/L
CO2 SERPL-SCNC: 20 MMOL/L
CREAT SERPL-MCNC: 1.3 MG/DL
DIFFERENTIAL METHOD: ABNORMAL
EOSINOPHIL # BLD AUTO: 0.3 K/UL
EOSINOPHIL NFR BLD: 5.1 %
ERYTHROCYTE [DISTWIDTH] IN BLOOD BY AUTOMATED COUNT: 18.3 %
EST. GFR  (AFRICAN AMERICAN): 47 ML/MIN/1.73 M^2
EST. GFR  (NON AFRICAN AMERICAN): 41 ML/MIN/1.73 M^2
GLUCOSE SERPL-MCNC: 113 MG/DL
HCT VFR BLD AUTO: 30.3 %
HGB BLD-MCNC: 9.4 G/DL
LYMPHOCYTES # BLD AUTO: 1.5 K/UL
LYMPHOCYTES NFR BLD: 25.5 %
MAGNESIUM SERPL-MCNC: 1.6 MG/DL
MCH RBC QN AUTO: 25.8 PG
MCHC RBC AUTO-ENTMCNC: 31 G/DL
MCV RBC AUTO: 83 FL
MONOCYTES # BLD AUTO: 0.7 K/UL
MONOCYTES NFR BLD: 11.4 %
NEUTROPHILS # BLD AUTO: 3.5 K/UL
NEUTROPHILS NFR BLD: 57.8 %
PHOSPHATE SERPL-MCNC: 2.7 MG/DL
PLATELET # BLD AUTO: 193 K/UL
PMV BLD AUTO: 11 FL
POCT GLUCOSE: 111 MG/DL (ref 70–110)
POCT GLUCOSE: 129 MG/DL (ref 70–110)
POTASSIUM SERPL-SCNC: 4 MMOL/L
RBC # BLD AUTO: 3.64 M/UL
SODIUM SERPL-SCNC: 141 MMOL/L
WBC # BLD AUTO: 6.04 K/UL

## 2018-08-05 PROCEDURE — 84100 ASSAY OF PHOSPHORUS: CPT

## 2018-08-05 PROCEDURE — C9113 INJ PANTOPRAZOLE SODIUM, VIA: HCPCS | Performed by: INTERNAL MEDICINE

## 2018-08-05 PROCEDURE — 36415 COLL VENOUS BLD VENIPUNCTURE: CPT

## 2018-08-05 PROCEDURE — 83735 ASSAY OF MAGNESIUM: CPT

## 2018-08-05 PROCEDURE — 94761 N-INVAS EAR/PLS OXIMETRY MLT: CPT

## 2018-08-05 PROCEDURE — 99231 SBSQ HOSP IP/OBS SF/LOW 25: CPT | Mod: ,,, | Performed by: SURGERY

## 2018-08-05 PROCEDURE — 85025 COMPLETE CBC W/AUTO DIFF WBC: CPT

## 2018-08-05 PROCEDURE — 80048 BASIC METABOLIC PNL TOTAL CA: CPT

## 2018-08-05 PROCEDURE — 63600175 PHARM REV CODE 636 W HCPCS: Performed by: INTERNAL MEDICINE

## 2018-08-05 PROCEDURE — 25000003 PHARM REV CODE 250: Performed by: INTERNAL MEDICINE

## 2018-08-05 PROCEDURE — 25000003 PHARM REV CODE 250: Performed by: NURSE PRACTITIONER

## 2018-08-05 RX ADMIN — DEXTROSE 40 MG: 50 INJECTION, SOLUTION INTRAVENOUS at 08:08

## 2018-08-05 RX ADMIN — DEXTROSE MONOHYDRATE AND SODIUM CHLORIDE: 5; .9 INJECTION, SOLUTION INTRAVENOUS at 12:08

## 2018-08-05 NOTE — DISCHARGE SUMMARY
[facility] Hospital Medicine  Discharge Summary      Patient Name: Su Callejas  MRN: [id number]  Admission Date: 8/3/2018  Hospital Length of Stay: 1 days  Discharge Date and Time:  08/05/2018 11:18 AM  Attending Physician: Chely Goldstein MD   Discharging Provider: Barb Zurita NP  Primary Care Provider: Hasmukh Perdomo MD      HPI:   Ms. Callejas is a 73-year-old female with a past medical history of kidney cancer with remote left nephrectomy, bladder cancer with recent chemotherapy treatment, type 2 diabetes, hypertension, lupus, fibromyalgia, and history of bowel resection in early 2017.  She presented to the emergency department with complaints of generalized abdominal pain that has progressively worsened throughout the day today.  Associated nausea, vomiting, and chills.  No aggravating or alleviating factors.  Denies any abdominal distention, diarrhea, constipation, dysuria, hematuria, hematemesis, melena, hematochezia, chest pain, palpitations, shortness breath, cough, orthopnea, edema, lightheadedness or dizziness, syncope, headache, altered mental status, or fever.  Patient states she was seen in an ED three weeks ago for abdominal pain and was diagnosed with constipation.  She states current abdominal pain is different from the pain she had three weeks ago.  She recently moved to [place] from [place] three weeks ago due to emergent family issues.  She was under the care of an oncologist and urologist in [place], and was undergoing chemotherapy treatment at the time of her move, last treatment was four weeks ago.  She completed four weeks of chemotherapy, but treatment plan was to complete six weeks.  She has not established care with a local oncologist or urologist since moving from [place].  Workup in ED resulted creatinine 1.8, BUN 33.  CT of ABD/pelvis showed multiple loops of mildly dilated small bowel in the upper abdomen with possible transition in the right  "mid abdomen suggestive of developing or partial small bowel obstruction.  In the ED, patient received IV Dilaudid, IV antiemetics, and IV fluids.  Hospital Medicine was called for admission.  Currently patient appears comfortable in no acute distress, she is drowsy from recent IV analgesics given in ED.      * No surgery found *      Hospital Course:   On 8/3 patient was admitted to [facility] secondary to a Partial SBO.  CT ABD/pelvis showed multiple loops of mildly dilated fluid-filled small bowel noted involving the upper abdomen with transition in the mid abdomen suggesting partial or developing small bowel obstruction.  Patient was placed NPO and an NGT was placed to low wall suction.  IVFs started.  As of 8/4 patient has been evaluated by Dr. Tate with general surgery who agrees with conservative treatment with ambulation and IV hydration. Patient has a h/o right nephrectomy in 2015 secondary to h/o kidney cancer.  Cr currently 1.8.  Baseline unknown as patient has recently moved from [place].  Also has a h/o bladder cancer and last reported chemo approximately 4 weeks ago.  Will need to establish outpatient f/u upon MS.  Patient reports being started on Keflex by PCP yesterday for a "bladder infection".  UA negative for nitrites, trace leukocytes.  UC pending.  Hold off on ABX for now.  As of 8/5 patient pulled out NGT yesterday and refused to have it replaced.  She has ambulated in the hallway and reports having a formed BM this morning.  Also reports passing flatus.  She is currently denying abdominal pain, nausea, and vomiting.  BS are normoactive.  No abdominal tenderness noted on exam.  She tolerated a CL diet this morning without difficulty.  Cr improved to 1.3 with IVFs (baseline unknown).  Patient evaluated by alyssa Orlando to [facility] from a surgical standpoint today.  Case d/w Dr. Goldstein.  Patient seen and examined and deemed medically stable to [facility] today.   Per her son who was present at the  yesterday " her PCP Dr. Perdomo is arranging outpatient urology and oncology follow-ups.  Patient and son are aware of the need to f/u ASAP.  She was instructed to f/u with Dr. Perdomo within 3 days for a post hospital evaluation and verbalized + understanding.  UC pending.  Will f/u and notify patient if antibiotics are required.  Medications reconciled for DC home.     Consults:   Consults         Status Ordering Provider     Inpatient consult to General Surgery  Once     Provider:  Ezequiel Tate MD    Completed DELLA LAYNE     Inpatient consult to Registered Dietitian/Nutritionist  Once     Provider:  (Not yet assigned)    Completed HENRY LINDO          No new Assessment & Plan notes have been filed under this hospital service since the last note was generated.  Service: Hospital Medicine    Final Active Diagnoses:    Diagnosis Date Noted POA    History of kidney cancer with remote unilateral nephrectomy  [Z90.5] 08/04/2018 Not Applicable     Chronic    Bladder cancer [C67.9] 08/04/2018 Yes     Chronic    Type 2 diabetes mellitus [E11.9] 08/04/2018 Yes     Chronic    Essential hypertension [I10] 08/04/2018 Yes     Chronic      Problems Resolved During this Admission:    Diagnosis Date Noted Date Resolved POA    PRINCIPAL PROBLEM:  Small bowel obstruction, partial [K56.600] 08/04/2018 08/05/2018 Yes    RYDER (acute kidney injury) [N17.9] 08/04/2018 08/05/2018 Yes       Discharged Condition: stable    Disposition: Home or Self Care    Follow Up:  Follow-up Information     Hasmukh Perdomo MD In 3 days.    Specialty:  Family Medicine  Why:  F/U with PCP in 3 days for post hospital evaluation.  Patient will also need to arrange local f/u with an Oncologist and Urologist given h/o bladder cancer and renal cancer s/p nephrectomy.  Primary MD to facilitate referrals upon DC.   Contact information:  [facility]  [address]  [place] [address]  [phone]                 Patient Instructions:     Diet clear liquid    Order Comments: Advance to Full liquid diet, then regular diet as tolerated     Notify your health care provider if you experience any of the following:  temperature >100.4     Notify your health care provider if you experience any of the following:  persistent nausea and vomiting or diarrhea     Notify your health care provider if you experience any of the following:  severe uncontrolled pain     Activity as tolerated         Significant Diagnostic Studies: Labs:   BMP:     Recent Labs  Lab 08/03/18 2118 08/04/18 0435 08/05/18  0413   * 100 113*    142 141   K 4.9 5.1 4.0    109 112*   CO2 21* 22* 20*   BUN 33* 34* 21   CREATININE 1.8* 1.8* 1.3   CALCIUM 9.8 9.0 8.6*   MG  --  1.8 1.6   , CMP     Recent Labs  Lab 08/03/18 2118 08/04/18 0435 08/05/18  0413    142 141   K 4.9 5.1 4.0    109 112*   CO2 21* 22* 20*   * 100 113*   BUN 33* 34* 21   CREATININE 1.8* 1.8* 1.3   CALCIUM 9.8 9.0 8.6*   PROT 7.7  --   --    ALBUMIN 3.8  --   --    BILITOT 0.5  --   --    ALKPHOS 88  --   --    AST 18  --   --    ALT 24  --   --    ANIONGAP 14 11 9   ESTGFRAFRICA 32* 32* 47*   EGFRNONAA 28* 28* 41*    and CBC     Recent Labs  Lab 08/03/18 2118 08/04/18 0435 08/05/18 0413   WBC 9.86 5.81 6.04   HGB 12.5 11.1* 9.4*   HCT 39.6 36.3* 30.3*    217 193     Microbiology: Urine Culture  No results found for: LABURIN    Pending Diagnostic Studies:     None         Medications:  Reconciled Home Medications:      Medication List      CONTINUE taking these medications    DULoxetine 60 MG capsule  Commonly known as:  CYMBALTA  Take 60 mg by mouth once daily.     ketoconazole 2 % cream  Commonly known as:  NIZORAL  Apply topically once daily.        STOP taking these medications    cephALEXin 500 MG capsule  Commonly known as:  KEFLEX            Indwelling Lines/Drains at time of discharge:   Lines/Drains/Airways          No matching active lines, drains, or airways          Time spent on  the discharge of patient: 40 minutes  Patient was seen and examined on the date of discharge and determined to be suitable for discharge.         Barb Zurita DNP, ACNP-BC  Department of Hospital Medicine  [facility]

## 2018-08-05 NOTE — PLAN OF CARE
Problem: Patient Care Overview  Goal: Plan of Care Review  Outcome: Ongoing (interventions implemented as appropriate)  Pt in bed AAOx4. Plan of Care reviewed, Verbalized understanding.  Patient remained free from falls, fall precautions in place.   Pt is NSR on monitor. VSS.   PIV CDI with Dextrose 5% and NS running @125ml/hr.  Call bell and personal belongings within reach. Hourly rounding complete. Reminded to call for assistance.   No complaints at this time. Will continue to monitor.

## 2018-08-07 ENCOUNTER — DOCUMENTATION ONLY (OUTPATIENT)
Dept: HEPATOLOGY | Facility: HOSPITAL | Age: 73
End: 2018-08-07

## 2018-08-07 LAB — BACTERIA UR CULT: NORMAL

## 2018-08-07 NOTE — PROGRESS NOTES
Final UC showed MRSA sensitive to Doxycycline. Spoke with patient's son Oumar who was notified of culture results and the need for an antibiotic.  Prescription for Doxycycline 100 mg po q 12 hours x 7 days called in to [facility] pharmacy in [place] per son's request.  Son was instructed for patient to f/u with her PCP should symptoms occur or worsen and verbalized + understanding.

## 2018-08-10 ENCOUNTER — HOSPITAL ENCOUNTER (INPATIENT)
Facility: HOSPITAL | Age: 73
LOS: 3 days | Discharge: HOME | DRG: 389 | End: 2018-08-13
Attending: FAMILY MEDICINE | Admitting: SURGERY
Payer: MEDICARE

## 2018-08-10 DIAGNOSIS — K56.609 SMALL BOWEL OBSTRUCTION: Primary | ICD-10-CM

## 2018-08-10 LAB
ALBUMIN SERPL BCP-MCNC: 3.8 G/DL
ALP SERPL-CCNC: 75 U/L
ALT SERPL W/O P-5'-P-CCNC: 15 U/L
ANION GAP SERPL CALC-SCNC: 16 MMOL/L
AST SERPL-CCNC: 21 U/L
BASOPHILS # BLD AUTO: 0.02 K/UL
BASOPHILS NFR BLD: 0.1 %
BILIRUB SERPL-MCNC: 0.3 MG/DL
BUN SERPL-MCNC: 26 MG/DL
CALCIUM SERPL-MCNC: 10 MG/DL
CHLORIDE SERPL-SCNC: 103 MMOL/L
CO2 SERPL-SCNC: 22 MMOL/L
CREAT SERPL-MCNC: 1.6 MG/DL
DIFFERENTIAL METHOD: ABNORMAL
EOSINOPHIL # BLD AUTO: 0.3 K/UL
EOSINOPHIL NFR BLD: 1.7 %
ERYTHROCYTE [DISTWIDTH] IN BLOOD BY AUTOMATED COUNT: 17.9 %
EST. GFR  (AFRICAN AMERICAN): 37 ML/MIN/1.73 M^2
EST. GFR  (NON AFRICAN AMERICAN): 32 ML/MIN/1.73 M^2
GLUCOSE SERPL-MCNC: 110 MG/DL
HCT VFR BLD AUTO: 39.2 %
HGB BLD-MCNC: 12.6 G/DL
LIPASE SERPL-CCNC: 27 U/L
LYMPHOCYTES # BLD AUTO: 1.2 K/UL
LYMPHOCYTES NFR BLD: 7.7 %
MCH RBC QN AUTO: 26.4 PG
MCHC RBC AUTO-ENTMCNC: 32.1 G/DL
MCV RBC AUTO: 82 FL
MONOCYTES # BLD AUTO: 0.7 K/UL
MONOCYTES NFR BLD: 4.3 %
NEUTROPHILS # BLD AUTO: 13.3 K/UL
NEUTROPHILS NFR BLD: 86.2 %
PLATELET # BLD AUTO: 292 K/UL
PMV BLD AUTO: 10.7 FL
POTASSIUM SERPL-SCNC: 4.9 MMOL/L
PROT SERPL-MCNC: 7.7 G/DL
RBC # BLD AUTO: 4.78 M/UL
SODIUM SERPL-SCNC: 141 MMOL/L
WBC # BLD AUTO: 15.44 K/UL

## 2018-08-10 PROCEDURE — 11000001 HC ACUTE MED/SURG PRIVATE ROOM

## 2018-08-10 PROCEDURE — 25000003 PHARM REV CODE 250: Performed by: REGISTERED NURSE

## 2018-08-10 PROCEDURE — 85025 COMPLETE CBC W/AUTO DIFF WBC: CPT

## 2018-08-10 PROCEDURE — 96372 THER/PROPH/DIAG INJ SC/IM: CPT | Mod: 59

## 2018-08-10 PROCEDURE — 80053 COMPREHEN METABOLIC PANEL: CPT

## 2018-08-10 PROCEDURE — 36415 COLL VENOUS BLD VENIPUNCTURE: CPT

## 2018-08-10 PROCEDURE — 99285 EMERGENCY DEPT VISIT HI MDM: CPT | Mod: 25

## 2018-08-10 PROCEDURE — 25500020 PHARM REV CODE 255: Performed by: FAMILY MEDICINE

## 2018-08-10 PROCEDURE — 25000003 PHARM REV CODE 250: Performed by: FAMILY MEDICINE

## 2018-08-10 PROCEDURE — 63600175 PHARM REV CODE 636 W HCPCS: Performed by: FAMILY MEDICINE

## 2018-08-10 PROCEDURE — 96374 THER/PROPH/DIAG INJ IV PUSH: CPT

## 2018-08-10 PROCEDURE — 96375 TX/PRO/DX INJ NEW DRUG ADDON: CPT | Mod: 59

## 2018-08-10 PROCEDURE — 83690 ASSAY OF LIPASE: CPT

## 2018-08-10 PROCEDURE — 96376 TX/PRO/DX INJ SAME DRUG ADON: CPT

## 2018-08-10 PROCEDURE — 63600175 PHARM REV CODE 636 W HCPCS: Performed by: REGISTERED NURSE

## 2018-08-10 PROCEDURE — 96361 HYDRATE IV INFUSION ADD-ON: CPT

## 2018-08-10 RX ORDER — HYDROMORPHONE HYDROCHLORIDE 2 MG/ML
0.25 INJECTION, SOLUTION INTRAMUSCULAR; INTRAVENOUS; SUBCUTANEOUS
Status: COMPLETED | OUTPATIENT
Start: 2018-08-10 | End: 2018-08-10

## 2018-08-10 RX ORDER — SODIUM CHLORIDE 9 MG/ML
500 INJECTION, SOLUTION INTRAVENOUS ONCE
Status: COMPLETED | OUTPATIENT
Start: 2018-08-10 | End: 2018-08-10

## 2018-08-10 RX ORDER — KETOROLAC TROMETHAMINE 30 MG/ML
15 INJECTION, SOLUTION INTRAMUSCULAR; INTRAVENOUS
Status: COMPLETED | OUTPATIENT
Start: 2018-08-10 | End: 2018-08-10

## 2018-08-10 RX ORDER — ONDANSETRON 2 MG/ML
4 INJECTION INTRAMUSCULAR; INTRAVENOUS
Status: COMPLETED | OUTPATIENT
Start: 2018-08-10 | End: 2018-08-10

## 2018-08-10 RX ADMIN — SODIUM CHLORIDE 1000 ML: 0.9 INJECTION, SOLUTION INTRAVENOUS at 09:08

## 2018-08-10 RX ADMIN — ONDANSETRON 4 MG: 2 INJECTION, SOLUTION INTRAMUSCULAR; INTRAVENOUS at 08:08

## 2018-08-10 RX ADMIN — SODIUM CHLORIDE 500 ML: 0.9 INJECTION, SOLUTION INTRAVENOUS at 08:08

## 2018-08-10 RX ADMIN — KETOROLAC TROMETHAMINE 15 MG: 30 INJECTION, SOLUTION INTRAMUSCULAR at 09:08

## 2018-08-10 RX ADMIN — HYDROMORPHONE HYDROCHLORIDE 0.25 MG: 2 INJECTION, SOLUTION INTRAMUSCULAR; INTRAVENOUS; SUBCUTANEOUS at 10:08

## 2018-08-10 RX ADMIN — IOHEXOL 30 ML: 350 INJECTION, SOLUTION INTRAVENOUS at 10:08

## 2018-08-11 PROBLEM — K56.609 SMALL BOWEL OBSTRUCTION: Status: ACTIVE | Noted: 2018-08-11

## 2018-08-11 LAB
ANION GAP SERPL CALC-SCNC: 10 MMOL/L
BASOPHILS # BLD AUTO: 0.03 K/UL
BASOPHILS NFR BLD: 0.5 %
BILIRUB UR QL STRIP: NEGATIVE
BUN SERPL-MCNC: 27 MG/DL
CALCIUM SERPL-MCNC: 8.4 MG/DL
CHLORIDE SERPL-SCNC: 112 MMOL/L
CLARITY UR: CLEAR
CO2 SERPL-SCNC: 19 MMOL/L
COLOR UR: YELLOW
CREAT SERPL-MCNC: 1.4 MG/DL
DIFFERENTIAL METHOD: ABNORMAL
EOSINOPHIL # BLD AUTO: 0.3 K/UL
EOSINOPHIL NFR BLD: 4.9 %
ERYTHROCYTE [DISTWIDTH] IN BLOOD BY AUTOMATED COUNT: 18.4 %
EST. GFR  (AFRICAN AMERICAN): 43 ML/MIN/1.73 M^2
EST. GFR  (NON AFRICAN AMERICAN): 38 ML/MIN/1.73 M^2
GLUCOSE SERPL-MCNC: 76 MG/DL
GLUCOSE UR QL STRIP: NEGATIVE
HCT VFR BLD AUTO: 32.2 %
HGB BLD-MCNC: 10.1 G/DL
HGB UR QL STRIP: NEGATIVE
KETONES UR QL STRIP: NEGATIVE
LACTATE SERPL-SCNC: 1.5 MMOL/L
LEUKOCYTE ESTERASE UR QL STRIP: NEGATIVE
LYMPHOCYTES # BLD AUTO: 1.9 K/UL
LYMPHOCYTES NFR BLD: 30.5 %
MAGNESIUM SERPL-MCNC: 1.2 MG/DL
MCH RBC QN AUTO: 26 PG
MCHC RBC AUTO-ENTMCNC: 31.4 G/DL
MCV RBC AUTO: 83 FL
MONOCYTES # BLD AUTO: 0.7 K/UL
MONOCYTES NFR BLD: 10.8 %
NEUTROPHILS # BLD AUTO: 3.4 K/UL
NEUTROPHILS NFR BLD: 53.3 %
NITRITE UR QL STRIP: NEGATIVE
PH UR STRIP: 6 [PH] (ref 5–8)
PHOSPHATE SERPL-MCNC: 3.3 MG/DL
PLATELET # BLD AUTO: 216 K/UL
PMV BLD AUTO: 10.2 FL
POCT GLUCOSE: 84 MG/DL (ref 70–110)
POCT GLUCOSE: 88 MG/DL (ref 70–110)
POCT GLUCOSE: 92 MG/DL (ref 70–110)
POTASSIUM SERPL-SCNC: 4.5 MMOL/L
PROT UR QL STRIP: NEGATIVE
RBC # BLD AUTO: 3.89 M/UL
SODIUM SERPL-SCNC: 141 MMOL/L
SP GR UR STRIP: 1.02 (ref 1–1.03)
URN SPEC COLLECT METH UR: NORMAL
UROBILINOGEN UR STRIP-ACNC: NEGATIVE EU/DL
WBC # BLD AUTO: 6.37 K/UL

## 2018-08-11 PROCEDURE — 25000003 PHARM REV CODE 250: Performed by: SURGERY

## 2018-08-11 PROCEDURE — 85025 COMPLETE CBC W/AUTO DIFF WBC: CPT

## 2018-08-11 PROCEDURE — 84100 ASSAY OF PHOSPHORUS: CPT

## 2018-08-11 PROCEDURE — 99222 1ST HOSP IP/OBS MODERATE 55: CPT | Mod: AI,,, | Performed by: SURGERY

## 2018-08-11 PROCEDURE — 63600175 PHARM REV CODE 636 W HCPCS: Performed by: FAMILY MEDICINE

## 2018-08-11 PROCEDURE — 25000003 PHARM REV CODE 250: Performed by: FAMILY MEDICINE

## 2018-08-11 PROCEDURE — 81003 URINALYSIS AUTO W/O SCOPE: CPT

## 2018-08-11 PROCEDURE — C9113 INJ PANTOPRAZOLE SODIUM, VIA: HCPCS | Performed by: SURGERY

## 2018-08-11 PROCEDURE — 83605 ASSAY OF LACTIC ACID: CPT

## 2018-08-11 PROCEDURE — 21400001 HC TELEMETRY ROOM

## 2018-08-11 PROCEDURE — 36415 COLL VENOUS BLD VENIPUNCTURE: CPT

## 2018-08-11 PROCEDURE — 80048 BASIC METABOLIC PNL TOTAL CA: CPT

## 2018-08-11 PROCEDURE — 63600175 PHARM REV CODE 636 W HCPCS: Performed by: SURGERY

## 2018-08-11 PROCEDURE — 83735 ASSAY OF MAGNESIUM: CPT

## 2018-08-11 RX ORDER — NITROGLYCERIN 0.3 MG/1
0.3 TABLET SUBLINGUAL EVERY 5 MIN PRN
COMMUNITY

## 2018-08-11 RX ORDER — ONDANSETRON 2 MG/ML
4 INJECTION INTRAMUSCULAR; INTRAVENOUS EVERY 8 HOURS PRN
Status: DISCONTINUED | OUTPATIENT
Start: 2018-08-11 | End: 2018-08-13 | Stop reason: HOSPADM

## 2018-08-11 RX ORDER — ASPIRIN 81 MG/1
81 TABLET ORAL DAILY
COMMUNITY

## 2018-08-11 RX ORDER — SIMVASTATIN 20 MG/1
20 TABLET, FILM COATED ORAL NIGHTLY
COMMUNITY

## 2018-08-11 RX ORDER — LEVOTHYROXINE SODIUM 25 UG/1
25 TABLET ORAL DAILY
COMMUNITY

## 2018-08-11 RX ORDER — DEXTROSE MONOHYDRATE, SODIUM CHLORIDE, AND POTASSIUM CHLORIDE 50; 1.49; 4.5 G/1000ML; G/1000ML; G/1000ML
INJECTION, SOLUTION INTRAVENOUS CONTINUOUS
Status: DISCONTINUED | OUTPATIENT
Start: 2018-08-11 | End: 2018-08-13

## 2018-08-11 RX ORDER — DIPHENHYDRAMINE HYDROCHLORIDE 50 MG/ML
25 INJECTION INTRAMUSCULAR; INTRAVENOUS EVERY 6 HOURS PRN
Status: DISCONTINUED | OUTPATIENT
Start: 2018-08-11 | End: 2018-08-13 | Stop reason: HOSPADM

## 2018-08-11 RX ORDER — METOPROLOL TARTRATE 1 MG/ML
5 INJECTION, SOLUTION INTRAVENOUS EVERY 6 HOURS
Status: DISCONTINUED | OUTPATIENT
Start: 2018-08-11 | End: 2018-08-12

## 2018-08-11 RX ORDER — HYDRALAZINE HYDROCHLORIDE 20 MG/ML
10 INJECTION INTRAMUSCULAR; INTRAVENOUS EVERY 8 HOURS PRN
Status: DISCONTINUED | OUTPATIENT
Start: 2018-08-11 | End: 2018-08-13 | Stop reason: HOSPADM

## 2018-08-11 RX ORDER — MORPHINE SULFATE 4 MG/ML
4 INJECTION, SOLUTION INTRAMUSCULAR; INTRAVENOUS
Status: DISCONTINUED | OUTPATIENT
Start: 2018-08-11 | End: 2018-08-13 | Stop reason: HOSPADM

## 2018-08-11 RX ORDER — GLUCAGON 1 MG
1 KIT INJECTION
Status: DISCONTINUED | OUTPATIENT
Start: 2018-08-11 | End: 2018-08-13 | Stop reason: HOSPADM

## 2018-08-11 RX ORDER — HYDROMORPHONE HYDROCHLORIDE 2 MG/ML
0.25 INJECTION, SOLUTION INTRAMUSCULAR; INTRAVENOUS; SUBCUTANEOUS
Status: COMPLETED | OUTPATIENT
Start: 2018-08-11 | End: 2018-08-11

## 2018-08-11 RX ORDER — MORPHINE SULFATE 2 MG/ML
2 INJECTION, SOLUTION INTRAMUSCULAR; INTRAVENOUS
Status: DISCONTINUED | OUTPATIENT
Start: 2018-08-11 | End: 2018-08-13 | Stop reason: HOSPADM

## 2018-08-11 RX ORDER — MAGNESIUM SULFATE HEPTAHYDRATE 40 MG/ML
2 INJECTION, SOLUTION INTRAVENOUS ONCE
Status: COMPLETED | OUTPATIENT
Start: 2018-08-11 | End: 2018-08-11

## 2018-08-11 RX ORDER — OXYCODONE HYDROCHLORIDE 15 MG/1
10 TABLET ORAL EVERY 4 HOURS PRN
Status: ON HOLD | COMMUNITY
End: 2020-09-20 | Stop reason: HOSPADM

## 2018-08-11 RX ORDER — INSULIN ASPART 100 [IU]/ML
0-5 INJECTION, SOLUTION INTRAVENOUS; SUBCUTANEOUS EVERY 6 HOURS PRN
Status: DISCONTINUED | OUTPATIENT
Start: 2018-08-11 | End: 2018-08-13 | Stop reason: HOSPADM

## 2018-08-11 RX ORDER — SODIUM CHLORIDE 9 MG/ML
INJECTION, SOLUTION INTRAVENOUS CONTINUOUS
Status: DISCONTINUED | OUTPATIENT
Start: 2018-08-11 | End: 2018-08-11

## 2018-08-11 RX ORDER — SODIUM CHLORIDE, SODIUM LACTATE, POTASSIUM CHLORIDE, CALCIUM CHLORIDE 600; 310; 30; 20 MG/100ML; MG/100ML; MG/100ML; MG/100ML
INJECTION, SOLUTION INTRAVENOUS CONTINUOUS
Status: DISCONTINUED | OUTPATIENT
Start: 2018-08-11 | End: 2018-08-11

## 2018-08-11 RX ORDER — PANTOPRAZOLE SODIUM 40 MG/1
40 TABLET, DELAYED RELEASE ORAL DAILY
Status: ON HOLD | COMMUNITY
End: 2020-09-20 | Stop reason: HOSPADM

## 2018-08-11 RX ORDER — PREGABALIN 75 MG/1
75 CAPSULE ORAL 2 TIMES DAILY
COMMUNITY
End: 2019-06-26

## 2018-08-11 RX ORDER — DOXYCYCLINE 100 MG/1
100 CAPSULE ORAL 2 TIMES DAILY
COMMUNITY
End: 2019-06-26

## 2018-08-11 RX ORDER — METOPROLOL TARTRATE 50 MG/1
50 TABLET ORAL 2 TIMES DAILY
COMMUNITY

## 2018-08-11 RX ORDER — MULTIVITAMIN
1 TABLET ORAL DAILY
COMMUNITY
End: 2019-06-26

## 2018-08-11 RX ORDER — ONDANSETRON 2 MG/ML
4 INJECTION INTRAMUSCULAR; INTRAVENOUS
Status: COMPLETED | OUTPATIENT
Start: 2018-08-11 | End: 2018-08-11

## 2018-08-11 RX ADMIN — MAGNESIUM SULFATE IN WATER 2 G: 40 INJECTION, SOLUTION INTRAVENOUS at 10:08

## 2018-08-11 RX ADMIN — DIPHENHYDRAMINE HYDROCHLORIDE 25 MG: 50 INJECTION, SOLUTION INTRAMUSCULAR; INTRAVENOUS at 10:08

## 2018-08-11 RX ADMIN — Medication 2 MG: at 10:08

## 2018-08-11 RX ADMIN — METOPROLOL TARTRATE 5 MG: 5 INJECTION, SOLUTION INTRAVENOUS at 05:08

## 2018-08-11 RX ADMIN — ONDANSETRON 4 MG: 2 INJECTION, SOLUTION INTRAMUSCULAR; INTRAVENOUS at 02:08

## 2018-08-11 RX ADMIN — Medication 2 MG: at 01:08

## 2018-08-11 RX ADMIN — METOPROLOL TARTRATE 5 MG: 5 INJECTION, SOLUTION INTRAVENOUS at 01:08

## 2018-08-11 RX ADMIN — Medication 2 MG: at 08:08

## 2018-08-11 RX ADMIN — SODIUM CHLORIDE, SODIUM LACTATE, POTASSIUM CHLORIDE, AND CALCIUM CHLORIDE: .6; .31; .03; .02 INJECTION, SOLUTION INTRAVENOUS at 10:08

## 2018-08-11 RX ADMIN — ONDANSETRON 4 MG: 2 INJECTION, SOLUTION INTRAMUSCULAR; INTRAVENOUS at 10:08

## 2018-08-11 RX ADMIN — SODIUM CHLORIDE: 0.9 INJECTION, SOLUTION INTRAVENOUS at 09:08

## 2018-08-11 RX ADMIN — HYDRALAZINE HYDROCHLORIDE 10 MG: 20 INJECTION, SOLUTION INTRAMUSCULAR; INTRAVENOUS at 10:08

## 2018-08-11 RX ADMIN — DEXTROSE 40 MG: 50 INJECTION, SOLUTION INTRAVENOUS at 01:08

## 2018-08-11 RX ADMIN — DEXTROSE MONOHYDRATE, SODIUM CHLORIDE, AND POTASSIUM CHLORIDE: 50; 4.5; 1.49 INJECTION, SOLUTION INTRAVENOUS at 10:08

## 2018-08-11 RX ADMIN — HYDROMORPHONE HYDROCHLORIDE 0.25 MG: 2 INJECTION, SOLUTION INTRAMUSCULAR; INTRAVENOUS; SUBCUTANEOUS at 02:08

## 2018-08-11 RX ADMIN — SODIUM CHLORIDE 1000 ML: 0.9 INJECTION, SOLUTION INTRAVENOUS at 02:08

## 2018-08-11 NOTE — ED NOTES
Patient aware that we are awaiting a urine specimen.  Patient refusing to try at this time.  Patient and family members instructed to call as soon as patient is able to void and provide specimen.

## 2018-08-11 NOTE — PLAN OF CARE
Met with patient. Patient was recently discharged from [facility] last week. Patient indicated that she was hospitalized for the same reasons. Lane calixto completed.  Patient recently moved in with her son here in [place]. Unfortunately she left her walker and bath bench back at her daughter's home in [place]. Patient stated that she was told last admission that she would receive home health but that she has not seen a nurse or had any visits from a home health agency.  Per her chart she requested home health services but no orders were placed. Will have case management follow for orders if appropriate. Transitional Care Folder, Discharge Planning Begins on Admission pamphlet, [facility], Advance Directive information given to patient along with the contact information given.Instructed patient to call with any questions or concerns.      No Pharmacies Listed  Hasmukh Perdomo MD  Payor: [employer] MANAGED MEDICARE / Plan: [employer] MEDICARE HMO / Product Type: Capitation /         08/11/18 1307   Discharge Assessment   Assessment Type Discharge Planning Assessment   Confirmed/corrected address and phone number on facesheet? Yes   Assessment information obtained from? Patient;Medical Record   Expected Length of Stay (days) (tbd)   Communicated expected length of stay with patient/caregiver no   Prior to hospitilization cognitive status: Alert/Oriented   Prior to hospitalization functional status: Independent   Current cognitive status: Alert/Oriented   Current Functional Status: Assistive Equipment;Independent   Facility Arrived From: home   Lives With child(marybel), adult   Able to Return to Prior Arrangements unable to determine at this time (comments)   Is patient able to care for self after discharge? Unable to determine at this time (comments)   Who are your caregiver(s) and their phone number(s)? Oumar Aguilar ( son ) [phone]   Patient's perception of discharge disposition home  or selfcare;home health;skilled nursing facility   Readmission Within The Last 30 Days previous discharge plan unsuccessful   If yes, most recent facility name: [id number]   Patient currently being followed by outpatient case management? No   Patient currently receives any other outside agency services? No   Equipment Currently Used at Home bath bench;walker, rolling   Do you have any problems affording any of your prescribed medications? No   Is the patient taking medications as prescribed? yes   Does the patient have transportation home? Yes   Transportation Available family or friend will provide;car   Does the patient receive services at the Coumadin Clinic? No   Discharge Plan A Home;Home with family   Discharge Plan B Home;Home with family;Home Health   Patient/Family In Agreement With Plan yes

## 2018-08-11 NOTE — ED NOTES
"Dr. Hicks informed that patient is refusing to drink oral contrast for CT scan, stating "I'm not drinking that. It's just going to upset my stomach."    "

## 2018-08-11 NOTE — HPI
73-year-old female referred for small bowel obstruction.  The patient reports increasing crampy abdominal pain, nausea vomiting and diarrhea.  She was recently admitted to the hospital a week ago with similar symptoms.  After discharge she was on liquids and advance to regular food however she began having worsening symptoms on starting her regular food.  CT done in the ER concerning for small bowel obstruction near her prior anastomosis.  Patient has had multiple prior abdominal surgeries including bowel obstruction surgery years ago for which she had to have bowel resection.  She was refusing an NG tube in the ER.  Currently she is complaining of crampy abdominal pain in her upper abdomen.

## 2018-08-11 NOTE — ED NOTES
Patient having multiple episodes of diarrhea.  Patient assisted and cleaned.  Patient placed over clean green pads.

## 2018-08-11 NOTE — SUBJECTIVE & OBJECTIVE
No current facility-administered medications on file prior to encounter.      Current Outpatient Prescriptions on File Prior to Encounter   Medication Sig    ketoconazole (NIZORAL) 2 % cream Apply topically once daily.    DULoxetine (CYMBALTA) 60 MG capsule Take 60 mg by mouth once daily.       Review of patient's allergies indicates:   Allergen Reactions    Sulfa (sulfonamide antibiotics)      Throat closing       Past Medical History:   Diagnosis Date    Bladder cancer     CAD (coronary artery disease)     Cancer     kidney    Cervical disc disease     Diabetes mellitus     Fibromyalgia     History of kidney cancer     Hyperlipidemia     Hypertension     Lumbar disc disease     Lupus     Malignant neoplasm of urinary bladder     Small bowel obstruction     Uncomplicated opioid dependence      Past Surgical History:   Procedure Laterality Date    COLON SURGERY      Bowel resection early 2018.     Family History     None        Social History Main Topics    Smoking status: Light Tobacco Smoker     Years: 25.00     Types: Cigarettes    Smokeless tobacco: Never Used    Alcohol use No    Drug use: No    Sexual activity: No     Review of Systems   Constitutional: Negative for chills, diaphoresis, fatigue, fever and unexpected weight change.   Respiratory: Negative for cough, shortness of breath, wheezing and stridor.    Cardiovascular: Negative for chest pain, palpitations and leg swelling.   Gastrointestinal: Positive for abdominal pain, diarrhea, nausea and vomiting. Negative for abdominal distention and constipation.   Genitourinary: Negative for difficulty urinating, dysuria, frequency, hematuria and urgency.   Skin: Negative for color change, pallor, rash and wound.   Hematological: Does not bruise/bleed easily.     Objective:     Vital Signs (Most Recent):  Temp: 97 °F (36.1 °C) (08/11/18 0759)  Pulse: 66 (08/11/18 0759)  Resp: 18 (08/11/18 0759)  BP: (!) 131/59 (08/11/18 0759)  SpO2: 99 %  (08/11/18 0759) Vital Signs (24h Range):  Temp:  [97 °F (36.1 °C)-98.5 °F (36.9 °C)] 97 °F (36.1 °C)  Pulse:  [66-89] 66  Resp:  [11-20] 18  SpO2:  [93 %-100 %] 99 %  BP: (131-163)/(59-88) 131/59     Weight: 64.4 kg (141 lb 15.6 oz)  Body mass index is 26.83 kg/m².    Physical Exam   Constitutional: She is oriented to person, place, and time. She appears well-developed and well-nourished. She appears distressed (mild).   HENT:   Head: Normocephalic and atraumatic.   Eyes: EOM are normal.   Neck: Neck supple.   Cardiovascular: Normal rate and regular rhythm.    Pulmonary/Chest: Effort normal and breath sounds normal.   Abdominal: Soft. Bowel sounds are normal. She exhibits distension (mild). There is tenderness.   Well-healed midline surgical scar   Neurological: She is alert and oriented to person, place, and time.   Skin: Skin is warm and dry.   Vitals reviewed.      Significant Labs:  CBC:   Recent Labs  Lab 08/11/18  0820   WBC 6.37   RBC 3.89*   HGB 10.1*   HCT 32.2*      MCV 83   MCH 26.0*   MCHC 31.4*     BMP:   Recent Labs  Lab 08/11/18  0820   GLU 76      K 4.5   *   CO2 19*   BUN 27*   CREATININE 1.4   CALCIUM 8.4*   MG 1.2*       Significant Diagnostics:  CT:  1. There are multiple dilated loops of small bowel in the expected location of the jejunum and ileum.  There are mild inflammatory changes adjacent to these dilated loops of small bowel. In the expected location of the midportion of the ilium there is a loop of small bowel with a diameter of 3.7 cm.  On the prior examination this loop of bowel was not dilated and had a diameter of 2.2 cm.  This is characteristic of an enteritis or small-bowel obstruction.  2. The common bile duct is dilated. It has a diameter of 14 mm.  If additional imaging evaluation is clinically indicated, I recommend consideration of an ERCP or MRCP.    Flat and erect x-ray:  Mildly prominent loop of small bowel in the left mid abdomen.  Surgical clips in the  right lower quadrant. Bowel suture material also noted.  No significant change from prior day's exam.

## 2018-08-11 NOTE — PLAN OF CARE
08/11/18 1259   Readmission Questionnaire   At the time of your discharge, did someone talk to you about what your health problems were? Yes   At the time of discharge, did someone talk to you about what to watch out for regarding worsening of your health problem? Yes   At the time of discharge, did someone talk to you about what to do if you experienced worsening of your health problem? Yes   At the time of discharge, did someone talk to you about which medication to take when you left the hospital and which ones to stop taking? Yes   What do you believe caused you to be sick enough to be re-admitted? abdominal pain   How often do you need to have someone help you when you read instructions, pamphlets, or other written material from your doctor or pharmacy? Never   Do you have problems taking your medications as prescribed? No   Do you have any problems affording any of  your prescribed medications? No   Do you have problems obtaining/receiving your medications? No   Does the patient have transportation to healthcare appointments? Yes   Lives With child(marybel), adult   Living Arrangements house   Does the patient have family/friends to help with healtcare needs after discharge? yes   Who are your caregiver(s) and their phone number(s)? Oumar Aguilar ( son ) [phone]   Are you currently feeling confused? No

## 2018-08-11 NOTE — H&P
[facility] -   General Surgery  History & Physical    Patient Name: Su Callejas  MRN: [id number]  Admission Date: 8/10/2018  Attending Physician: Rakesh Alston MD   Primary Care Provider: Hasmukh Perdomo MD    Patient information was obtained from patient.     Subjective:     Chief Complaint/Reason for Admission: small-bowel obstruction    History of Present Illness: 73-year-old female referred for small bowel obstruction.  The patient reports increasing crampy abdominal pain, nausea vomiting and diarrhea.  She was recently admitted to the hospital a week ago with similar symptoms.  After discharge she was on liquids and advance to regular food however she began having worsening symptoms on starting her regular food.  CT done in the ER concerning for small bowel obstruction near her prior anastomosis.  Patient has had multiple prior abdominal surgeries including bowel obstruction surgery years ago for which she had to have bowel resection.  She was refusing an NG tube in the ER.  Currently she is complaining of crampy abdominal pain in her upper abdomen.    No current facility-administered medications on file prior to encounter.      Current Outpatient Prescriptions on File Prior to Encounter   Medication Sig    ketoconazole (NIZORAL) 2 % cream Apply topically once daily.    DULoxetine (CYMBALTA) 60 MG capsule Take 60 mg by mouth once daily.       Review of patient's allergies indicates:   Allergen Reactions    Sulfa (sulfonamide antibiotics)      Throat closing       Past Medical History:   Diagnosis Date    Bladder cancer     CAD (coronary artery disease)     Cancer     kidney    Cervical disc disease     Diabetes mellitus     Fibromyalgia     History of kidney cancer     Hyperlipidemia     Hypertension     Lumbar disc disease     Lupus     Malignant neoplasm of urinary bladder     Small bowel obstruction     Uncomplicated opioid dependence      Past Surgical History:    Procedure Laterality Date    COLON SURGERY      Bowel resection early 2018.     Family History     None        Social History Main Topics    Smoking status: Light Tobacco Smoker     Years: 25.00     Types: Cigarettes    Smokeless tobacco: Never Used    Alcohol use No    Drug use: No    Sexual activity: No     Review of Systems   Constitutional: Negative for chills, diaphoresis, fatigue, fever and unexpected weight change.   Respiratory: Negative for cough, shortness of breath, wheezing and stridor.    Cardiovascular: Negative for chest pain, palpitations and leg swelling.   Gastrointestinal: Positive for abdominal pain, diarrhea, nausea and vomiting. Negative for abdominal distention and constipation.   Genitourinary: Negative for difficulty urinating, dysuria, frequency, hematuria and urgency.   Skin: Negative for color change, pallor, rash and wound.   Hematological: Does not bruise/bleed easily.     Objective:     Vital Signs (Most Recent):  Temp: 97 °F (36.1 °C) (08/11/18 0759)  Pulse: 66 (08/11/18 0759)  Resp: 18 (08/11/18 0759)  BP: (!) 131/59 (08/11/18 0759)  SpO2: 99 % (08/11/18 0759) Vital Signs (24h Range):  Temp:  [97 °F (36.1 °C)-98.5 °F (36.9 °C)] 97 °F (36.1 °C)  Pulse:  [66-89] 66  Resp:  [11-20] 18  SpO2:  [93 %-100 %] 99 %  BP: (131-163)/(59-88) 131/59     Weight: 64.4 kg (141 lb 15.6 oz)  Body mass index is 26.83 kg/m².    Physical Exam   Constitutional: She is oriented to person, place, and time. She appears well-developed and well-nourished. She appears distressed (mild).   HENT:   Head: Normocephalic and atraumatic.   Eyes: EOM are normal.   Neck: Neck supple.   Cardiovascular: Normal rate and regular rhythm.    Pulmonary/Chest: Effort normal and breath sounds normal.   Abdominal: Soft. Bowel sounds are normal. She exhibits distension (mild). There is tenderness.   Well-healed midline surgical scar   Neurological: She is alert and oriented to person, place, and time.   Skin: Skin is warm  and dry.   Vitals reviewed.      Significant Labs:  CBC:   Recent Labs  Lab 08/11/18  0820   WBC 6.37   RBC 3.89*   HGB 10.1*   HCT 32.2*      MCV 83   MCH 26.0*   MCHC 31.4*     BMP:   Recent Labs  Lab 08/11/18  0820   GLU 76      K 4.5   *   CO2 19*   BUN 27*   CREATININE 1.4   CALCIUM 8.4*   MG 1.2*       Significant Diagnostics:  CT:  1. There are multiple dilated loops of small bowel in the expected location of the jejunum and ileum.  There are mild inflammatory changes adjacent to these dilated loops of small bowel. In the expected location of the midportion of the ilium there is a loop of small bowel with a diameter of 3.7 cm.  On the prior examination this loop of bowel was not dilated and had a diameter of 2.2 cm.  This is characteristic of an enteritis or small-bowel obstruction.  2. The common bile duct is dilated. It has a diameter of 14 mm.  If additional imaging evaluation is clinically indicated, I recommend consideration of an ERCP or MRCP.    Flat and erect x-ray:  Mildly prominent loop of small bowel in the left mid abdomen.  Surgical clips in the right lower quadrant. Bowel suture material also noted.  No significant change from prior day's exam.    Assessment/Plan:     * Small bowel obstruction    NG tube placement, NPO, IV fluids  Serial abdominal exams  Discussed with patient if she does not improve or gets worse we will have to do surgery. Risks and benefits with each discussed with patient.  Patient understands and agrees to plan        Essential hypertension    IV Lopressor  Resume home blood pressure meds once able to tolerate p.o.        Type 2 diabetes mellitus    Insulin sliding scale        History of kidney cancer with remote unilateral nephrectomy     Follow-up with oncologist outpatient        Bladder cancer    Follow-up with oncologist outpatient          VTE Risk Mitigation     None          Rakesh Alston MD  General Surgery  [facility] -

## 2018-08-11 NOTE — ASSESSMENT & PLAN NOTE
NG tube placement, NPO, IV fluids  Serial abdominal exams  Discussed with patient if she does not improve or gets worse we will have to do surgery. Risks and benefits with each discussed with patient.  Patient understands and agrees to plan

## 2018-08-11 NOTE — ED PROVIDER NOTES
SCRIBE #1 NOTE: I, Nikki Dao, am scribing for, and in the presence of, Marge Hicks MD. I have scribed the entire note.      History      Chief Complaint   Patient presents with    Nausea    Vomiting       Review of patient's allergies indicates:   Allergen Reactions    Sulfa (sulfonamide antibiotics)      Throat closing        HPI   HPI    8/10/2018, 8:25 PM   History obtained from the patient      History of Present Illness: Su Callejas is a 73 y.o. female patient who presents to the Emergency Department for generalized abd pain which onset gradually today. Symptoms are constant and severe. No mitigating or exacerbating factors reported. Pt was diagnosed with a partial SBO on 8/4. She did not require surgery. She was discharged on 8/5. She reports her pain today is similar to then. Associated sxs include n/v/d. She reports 1 episode of diarrhea today. Patient denies any fever, chills, constipation, hematochezia, melena, dysuria, hematuria, frequency, and all other sxs at this time. No further complaints or concerns at this time.       Arrival mode: AASI    PCP: Hasmukh Perdomo MD       Past Medical History:  Past Medical History:   Diagnosis Date    Bladder cancer     Cancer     kidney    Diabetes mellitus     Fibromyalgia     History of kidney cancer     Hypertension     Lupus        Past Surgical History:  Past Surgical History:   Procedure Laterality Date    COLON SURGERY      Bowel resection early 2018.         Family History:  History reviewed. No pertinent family history.    Social History:  Social History     Social History Main Topics    Smoking status: Light Tobacco Smoker     Years: 25.00     Types: Cigarettes    Smokeless tobacco: Never Used    Alcohol use No    Drug use: No    Sexual activity: No       ROS   Review of Systems   Constitutional: Negative for chills and fever.   HENT: Negative for sore throat.    Respiratory: Negative for shortness of breath.   "  Cardiovascular: Negative for chest pain.   Gastrointestinal: Positive for abdominal pain (generalized), diarrhea, nausea and vomiting. Negative for constipation.   Genitourinary: Negative for dysuria, frequency and hematuria.   Musculoskeletal: Negative for back pain.   Skin: Negative for rash.   Neurological: Negative for weakness.   Hematological: Does not bruise/bleed easily.   All other systems reviewed and are negative.      Physical Exam      Initial Vitals [08/10/18 1955]   BP Pulse Resp Temp SpO2   (!) 160/88 89 18 98.5 °F (36.9 °C) 95 %      MAP       --          Physical Exam  Nursing Notes and Vital Signs Reviewed.  Constitutional: Patient is in moderate distress. Well-developed and well-nourished.  Head: Atraumatic. Normocephalic.  Eyes: PERRL. EOM intact. Conjunctivae are not pale. No scleral icterus.  ENT: Mucous membranes are moist. Oropharynx is clear and symmetric.    Neck: Supple. Full ROM. No lymphadenopathy.  Cardiovascular: Regular rate. Regular rhythm. No murmurs, rubs, or gallops. Distal pulses are 2+ and symmetric.  Pulmonary/Chest: No respiratory distress. Clear to auscultation bilaterally. No wheezing or rales.  Abdominal: Soft and non-distended.  There is diffuse tenderness.  No rebound, guarding, or rigidity. Good bowel sounds. No masses.  Musculoskeletal: Moves all extremities. No obvious deformities. No edema. No calf tenderness.  Skin: Warm and dry.  Neurological:  Alert, awake, and appropriate.  Normal speech.  No acute focal neurological deficits are appreciated.  Psychiatric: Normal affect. Good eye contact. Appropriate in content.    ED Course    Procedures  ED Vital Signs:  Vitals:    08/10/18 1955 08/10/18 2232 08/10/18 2332 08/11/18 0020   BP: (!) 160/88 (!) 157/74 (!) 155/73 (!) 163/72   Pulse: 89 73 73 71   Resp: 18 18 20 11   Temp: 98.5 °F (36.9 °C)      TempSrc: Oral      SpO2: 95% 98% 96% 98%   Height: 5' 1" (1.549 m)          Abnormal Lab Results:  Labs Reviewed   CBC W/ " AUTO DIFFERENTIAL - Abnormal; Notable for the following:        Result Value    WBC 15.44 (*)     MCH 26.4 (*)     RDW 17.9 (*)     Gran # (ANC) 13.3 (*)     Gran% 86.2 (*)     Lymph% 7.7 (*)     All other components within normal limits   COMPREHENSIVE METABOLIC PANEL - Abnormal; Notable for the following:     CO2 22 (*)     BUN, Bld 26 (*)     Creatinine 1.6 (*)     eGFR if  37 (*)     eGFR if non  32 (*)     All other components within normal limits   LIPASE   URINALYSIS        All Lab Results:  Results for orders placed or performed during the hospital encounter of 08/10/18   CBC W/ AUTO DIFFERENTIAL   Result Value Ref Range    WBC 15.44 (H) 3.90 - 12.70 K/uL    RBC 4.78 4.00 - 5.40 M/uL    Hemoglobin 12.6 12.0 - 16.0 g/dL    Hematocrit 39.2 37.0 - 48.5 %    MCV 82 82 - 98 fL    MCH 26.4 (L) 27.0 - 31.0 pg    MCHC 32.1 32.0 - 36.0 g/dL    RDW 17.9 (H) 11.5 - 14.5 %    Platelets 292 150 - 350 K/uL    MPV 10.7 9.2 - 12.9 fL    Gran # (ANC) 13.3 (H) 1.8 - 7.7 K/uL    Lymph # 1.2 1.0 - 4.8 K/uL    Mono # 0.7 0.3 - 1.0 K/uL    Eos # 0.3 0.0 - 0.5 K/uL    Baso # 0.02 0.00 - 0.20 K/uL    Gran% 86.2 (H) 38.0 - 73.0 %    Lymph% 7.7 (L) 18.0 - 48.0 %    Mono% 4.3 4.0 - 15.0 %    Eosinophil% 1.7 0.0 - 8.0 %    Basophil% 0.1 0.0 - 1.9 %    Differential Method Automated    Comp. Metabolic Panel   Result Value Ref Range    Sodium 141 136 - 145 mmol/L    Potassium 4.9 3.5 - 5.1 mmol/L    Chloride 103 95 - 110 mmol/L    CO2 22 (L) 23 - 29 mmol/L    Glucose 110 70 - 110 mg/dL    BUN, Bld 26 (H) 8 - 23 mg/dL    Creatinine 1.6 (H) 0.5 - 1.4 mg/dL    Calcium 10.0 8.7 - 10.5 mg/dL    Total Protein 7.7 6.0 - 8.4 g/dL    Albumin 3.8 3.5 - 5.2 g/dL    Total Bilirubin 0.3 0.1 - 1.0 mg/dL    Alkaline Phosphatase 75 55 - 135 U/L    AST 21 10 - 40 U/L    ALT 15 10 - 44 U/L    Anion Gap 16 8 - 16 mmol/L    eGFR if African American 37 (A) >60 mL/min/1.73 m^2    eGFR if non  32 (A) >60  mL/min/1.73 m^2   Lipase   Result Value Ref Range    Lipase 27 4 - 60 U/L   Urinalysis - Clean Catch   Result Value Ref Range    Specimen UA Urine, Clean Catch     Color, UA Yellow Yellow, Straw, Rena    Appearance, UA Clear Clear    pH, UA 6.0 5.0 - 8.0    Specific Gravity, UA 1.020 1.005 - 1.030    Protein, UA Negative Negative    Glucose, UA Negative Negative    Ketones, UA Negative Negative    Bilirubin (UA) Negative Negative    Occult Blood UA Negative Negative    Nitrite, UA Negative Negative    Urobilinogen, UA Negative <2.0 EU/dL    Leukocytes, UA Negative Negative       Imaging Results:  Imaging Results          CT Abdomen Pelvis  Without Contrast (In process)                X-Ray Abdomen Flat And Erect (Final result)  Result time 08/11/18 00:43:16    Final result by Noe De Luna MD (08/11/18 00:43:16)                 Impression:      Abnormal bowel-gas pattern worrisome for small bowel obstruction.      Electronically signed by: Noe De Luna MD  Date:    08/11/2018  Time:    00:43             Narrative:    EXAMINATION:  XR ABDOMEN FLAT AND ERECT    CLINICAL HISTORY:  Generalized abdominal pain.;    FINDINGS:  Lung bases are clear.  Postoperative abdomen with multiple surgical clips.  There are dilated small bowel loops, up to 4 cm, with air-fluid levels on upright imaging with otherwise a paucity of bowel gas worrisome for small bowel obstruction.  No free air.  No unusual calcification.  Bones are unremarkable.                               CT Abdomen Pelvis With Contrast (In process)                1:25 AM: Per STAT radiology, pt's CT results: Findings consistent with acute SBO, transition at a small bowel anastomosis in the R central lower abd. No definitive evidence for perforation at this time. Dilation of the common bile duct to 1 cm. No obstructing stone or process identified on the unenhanced CT. Exclude biliary obstruction clinically.          The Emergency Provider reviewed the vital signs  and test results, which are outlined above.    ED Discussion     2:00 AM: Discussed pt's case with Dr. Alston (General Surgery). Dr. Alston agrees with current care and management of pt and accepts admission. He recommends keeping pt NPO, IVF, and consulting HM.   Admitting Service: General surgery  Admitting Physician: Dr. Alston  Admit to: Med/surg    2:03 AM: Re-evaluated pt. I have discussed test results, shared treatment plan, and the need for admission with patient and family at bedside. Pt and family express understanding at this time and agree with all information. All questions answered. Pt and family have no further questions or concerns at this time. Pt is ready for admit.    2:06 AM: HM is aware of pt. Discussed case with Vinh Ying NP.     ED Medication(s):  Medications   ondansetron injection 4 mg (4 mg Intravenous Given 8/10/18 2014)   0.9%  NaCl infusion (0 mLs Intravenous Stopped 8/10/18 2107)   sodium chloride 0.9% bolus 1,000 mL (0 mLs Intravenous Stopped 8/10/18 2356)   ketorolac injection 15 mg (15 mg Intravenous Given 8/10/18 2113)   hydromorphone (PF) injection 0.25 mg (0.25 mg Intramuscular Given 8/10/18 2210)   omnipaque 350 iohexol 30 mL (30 mLs Oral Given 8/10/18 2210)       New Prescriptions    No medications on file             Medical Decision Making    Medical Decision Making:   Clinical Tests:   Lab Tests: Ordered and Reviewed  Radiological Study: Ordered and Reviewed           Scribe Attestation:   Scribe #1: I performed the above scribed service and the documentation accurately describes the services I performed. I attest to the accuracy of the note.    Attending:   Physician Attestation Statement for Scribe #1: I, Marge Hicks MD, personally performed the services described in this documentation, as scribed by Nikki Dao, in my presence, and it is both accurate and complete.          Clinical Impression       ICD-10-CM ICD-9-CM   1. Small bowel obstruction K56.609 560.9        Disposition:   Disposition: Admitted  Condition: Fair         Marge Hicks MD  08/12/18 6863

## 2018-08-11 NOTE — ED NOTES
Patient sleeping in bed in no acute distress.  RR equal and non-labored.  Vitals stable.  Bed is low and locked with side rails up x 2 and call light within reach.  Awaiting placement to hospital floor.

## 2018-08-11 NOTE — ED NOTES
Patient refusing NG tube.  Education given on medical condition and need for placement.  Patient remains very adamant about not having NG tube placed.  Dr. Hicks notified.

## 2018-08-11 NOTE — ED NOTES
Patient given additional lemon swabs for mouth.  No distress noted.  Vitals stable.  Bed remains low and locked with side rails up x 2 and call button within reach.  Awaiting results and disposition.

## 2018-08-11 NOTE — ED NOTES
No room available for pt at this time. NP OK with administering zofran and fluids to pt for comfort until pt is placed in room and can be fully assessed.

## 2018-08-11 NOTE — ED NOTES
Patient assisted to bathroom by ERT.  Episode of diarrhea and emesis noted.  Patient still refusing NG tube.

## 2018-08-11 NOTE — ED NOTES
Patient provided with lemon oral swabs to moisten mouth.  Patient continues to refuse oral contrast for CT.

## 2018-08-11 NOTE — ED NOTES
"Dr. Hicks informed that patient has requested Dilaudid for pain.  Patient repeatedly yells "Bring me Dilaudid, I need dilaudid now, I can't do this without it."  "

## 2018-08-12 PROBLEM — A49.9 BACTERIAL UTI: Status: ACTIVE | Noted: 2018-08-12

## 2018-08-12 PROBLEM — N39.0 BACTERIAL UTI: Status: ACTIVE | Noted: 2018-08-12

## 2018-08-12 LAB
ANION GAP SERPL CALC-SCNC: 7 MMOL/L
BASOPHILS # BLD AUTO: 0.02 K/UL
BASOPHILS NFR BLD: 0.4 %
BUN SERPL-MCNC: 18 MG/DL
CALCIUM SERPL-MCNC: 9.6 MG/DL
CHLORIDE SERPL-SCNC: 107 MMOL/L
CO2 SERPL-SCNC: 25 MMOL/L
CREAT SERPL-MCNC: 1.3 MG/DL
DIFFERENTIAL METHOD: ABNORMAL
EOSINOPHIL # BLD AUTO: 0.4 K/UL
EOSINOPHIL NFR BLD: 7.9 %
ERYTHROCYTE [DISTWIDTH] IN BLOOD BY AUTOMATED COUNT: 18.1 %
EST. GFR  (AFRICAN AMERICAN): 47 ML/MIN/1.73 M^2
EST. GFR  (NON AFRICAN AMERICAN): 41 ML/MIN/1.73 M^2
GLUCOSE SERPL-MCNC: 112 MG/DL
HCT VFR BLD AUTO: 33.8 %
HGB BLD-MCNC: 10.6 G/DL
LYMPHOCYTES # BLD AUTO: 1.9 K/UL
LYMPHOCYTES NFR BLD: 40.6 %
MAGNESIUM SERPL-MCNC: 2.1 MG/DL
MCH RBC QN AUTO: 25.9 PG
MCHC RBC AUTO-ENTMCNC: 31.4 G/DL
MCV RBC AUTO: 82 FL
MONOCYTES # BLD AUTO: 0.5 K/UL
MONOCYTES NFR BLD: 9.8 %
NEUTROPHILS # BLD AUTO: 2 K/UL
NEUTROPHILS NFR BLD: 41.3 %
PHOSPHATE SERPL-MCNC: 3 MG/DL
PLATELET # BLD AUTO: 262 K/UL
PMV BLD AUTO: 10.3 FL
POCT GLUCOSE: 108 MG/DL (ref 70–110)
POCT GLUCOSE: 119 MG/DL (ref 70–110)
POCT GLUCOSE: 125 MG/DL (ref 70–110)
POTASSIUM SERPL-SCNC: 4.4 MMOL/L
RBC # BLD AUTO: 4.1 M/UL
SODIUM SERPL-SCNC: 139 MMOL/L
WBC # BLD AUTO: 4.71 K/UL

## 2018-08-12 PROCEDURE — 84100 ASSAY OF PHOSPHORUS: CPT

## 2018-08-12 PROCEDURE — 25000003 PHARM REV CODE 250: Performed by: NURSE PRACTITIONER

## 2018-08-12 PROCEDURE — 85025 COMPLETE CBC W/AUTO DIFF WBC: CPT

## 2018-08-12 PROCEDURE — 25000003 PHARM REV CODE 250: Performed by: SURGERY

## 2018-08-12 PROCEDURE — 99231 SBSQ HOSP IP/OBS SF/LOW 25: CPT | Mod: ,,, | Performed by: SURGERY

## 2018-08-12 PROCEDURE — 21400001 HC TELEMETRY ROOM

## 2018-08-12 PROCEDURE — 83735 ASSAY OF MAGNESIUM: CPT

## 2018-08-12 PROCEDURE — 80048 BASIC METABOLIC PNL TOTAL CA: CPT

## 2018-08-12 PROCEDURE — 36415 COLL VENOUS BLD VENIPUNCTURE: CPT

## 2018-08-12 PROCEDURE — 63600175 PHARM REV CODE 636 W HCPCS: Performed by: SURGERY

## 2018-08-12 RX ORDER — PREGABALIN 25 MG/1
75 CAPSULE ORAL 2 TIMES DAILY
Status: DISCONTINUED | OUTPATIENT
Start: 2018-08-12 | End: 2018-08-13 | Stop reason: HOSPADM

## 2018-08-12 RX ORDER — LEVOTHYROXINE SODIUM 25 UG/1
25 TABLET ORAL DAILY
Status: DISCONTINUED | OUTPATIENT
Start: 2018-08-12 | End: 2018-08-13 | Stop reason: HOSPADM

## 2018-08-12 RX ORDER — PANTOPRAZOLE SODIUM 40 MG/1
40 TABLET, DELAYED RELEASE ORAL DAILY
Status: DISCONTINUED | OUTPATIENT
Start: 2018-08-12 | End: 2018-08-13 | Stop reason: HOSPADM

## 2018-08-12 RX ORDER — SIMVASTATIN 20 MG/1
20 TABLET, FILM COATED ORAL NIGHTLY
Status: DISCONTINUED | OUTPATIENT
Start: 2018-08-12 | End: 2018-08-13 | Stop reason: HOSPADM

## 2018-08-12 RX ORDER — DOCUSATE SODIUM 100 MG/1
100 CAPSULE, LIQUID FILLED ORAL 2 TIMES DAILY
Status: DISCONTINUED | OUTPATIENT
Start: 2018-08-12 | End: 2018-08-13 | Stop reason: HOSPADM

## 2018-08-12 RX ORDER — METOPROLOL TARTRATE 50 MG/1
50 TABLET ORAL 2 TIMES DAILY
Status: DISCONTINUED | OUTPATIENT
Start: 2018-08-12 | End: 2018-08-13 | Stop reason: HOSPADM

## 2018-08-12 RX ORDER — DULOXETIN HYDROCHLORIDE 30 MG/1
60 CAPSULE, DELAYED RELEASE ORAL DAILY
Status: DISCONTINUED | OUTPATIENT
Start: 2018-08-12 | End: 2018-08-13 | Stop reason: HOSPADM

## 2018-08-12 RX ORDER — ASPIRIN 81 MG/1
81 TABLET ORAL DAILY
Status: DISCONTINUED | OUTPATIENT
Start: 2018-08-12 | End: 2018-08-13 | Stop reason: HOSPADM

## 2018-08-12 RX ORDER — DOXYCYCLINE HYCLATE 100 MG
100 TABLET ORAL EVERY 12 HOURS
Status: DISCONTINUED | OUTPATIENT
Start: 2018-08-12 | End: 2018-08-13 | Stop reason: HOSPADM

## 2018-08-12 RX ORDER — ENALAPRILAT 1.25 MG/ML
1.25 INJECTION INTRAVENOUS EVERY 6 HOURS PRN
Status: DISCONTINUED | OUTPATIENT
Start: 2018-08-12 | End: 2018-08-13 | Stop reason: HOSPADM

## 2018-08-12 RX ORDER — ENOXAPARIN SODIUM 100 MG/ML
40 INJECTION SUBCUTANEOUS EVERY 24 HOURS
Status: DISCONTINUED | OUTPATIENT
Start: 2018-08-12 | End: 2018-08-13 | Stop reason: HOSPADM

## 2018-08-12 RX ADMIN — DEXTROSE MONOHYDRATE, SODIUM CHLORIDE, AND POTASSIUM CHLORIDE: 50; 4.5; 1.49 INJECTION, SOLUTION INTRAVENOUS at 11:08

## 2018-08-12 RX ADMIN — ENOXAPARIN SODIUM 40 MG: 100 INJECTION SUBCUTANEOUS at 05:08

## 2018-08-12 RX ADMIN — DULOXETINE 60 MG: 30 CAPSULE, DELAYED RELEASE ORAL at 08:08

## 2018-08-12 RX ADMIN — METOPROLOL TARTRATE 5 MG: 5 INJECTION, SOLUTION INTRAVENOUS at 12:08

## 2018-08-12 RX ADMIN — LEVOTHYROXINE SODIUM 25 MCG: 25 TABLET ORAL at 08:08

## 2018-08-12 RX ADMIN — DIPHENHYDRAMINE HYDROCHLORIDE 25 MG: 50 INJECTION, SOLUTION INTRAMUSCULAR; INTRAVENOUS at 06:08

## 2018-08-12 RX ADMIN — METOPROLOL TARTRATE 50 MG: 50 TABLET, FILM COATED ORAL at 08:08

## 2018-08-12 RX ADMIN — PREGABALIN 75 MG: 25 CAPSULE ORAL at 09:08

## 2018-08-12 RX ADMIN — DIPHENHYDRAMINE HYDROCHLORIDE 25 MG: 50 INJECTION, SOLUTION INTRAMUSCULAR; INTRAVENOUS at 11:08

## 2018-08-12 RX ADMIN — DEXTROSE MONOHYDRATE, SODIUM CHLORIDE, AND POTASSIUM CHLORIDE: 50; 4.5; 1.49 INJECTION, SOLUTION INTRAVENOUS at 06:08

## 2018-08-12 RX ADMIN — DOXYCYCLINE HYCLATE 100 MG: 100 TABLET, COATED ORAL at 09:08

## 2018-08-12 RX ADMIN — DOCUSATE SODIUM 100 MG: 100 CAPSULE, LIQUID FILLED ORAL at 08:08

## 2018-08-12 RX ADMIN — METOPROLOL TARTRATE 5 MG: 5 INJECTION, SOLUTION INTRAVENOUS at 05:08

## 2018-08-12 RX ADMIN — MORPHINE SULFATE 4 MG: 4 INJECTION INTRAVENOUS at 12:08

## 2018-08-12 RX ADMIN — DEXTROSE MONOHYDRATE, SODIUM CHLORIDE, AND POTASSIUM CHLORIDE: 50; 4.5; 1.49 INJECTION, SOLUTION INTRAVENOUS at 02:08

## 2018-08-12 RX ADMIN — METOPROLOL TARTRATE 50 MG: 50 TABLET, FILM COATED ORAL at 09:08

## 2018-08-12 RX ADMIN — PANTOPRAZOLE SODIUM 40 MG: 40 TABLET, DELAYED RELEASE ORAL at 08:08

## 2018-08-12 RX ADMIN — SIMVASTATIN 20 MG: 20 TABLET, FILM COATED ORAL at 09:08

## 2018-08-12 RX ADMIN — PREGABALIN 75 MG: 25 CAPSULE ORAL at 08:08

## 2018-08-12 RX ADMIN — Medication 2 MG: at 11:08

## 2018-08-12 RX ADMIN — ASPIRIN 81 MG: 81 TABLET, COATED ORAL at 08:08

## 2018-08-12 NOTE — ASSESSMENT & PLAN NOTE
Was seeing a physician in [place] and getting BCG intravesicular treatment  Will need to follow up outpatient physician on discharge

## 2018-08-12 NOTE — NURSING
17F NG tube placed to right nare.  Pt tolerated it well.  Placement checked by aspiration of stomach contents and X ray placement.  Pt was  Connected low intermittent suction.   Pt states I should have went thru with this but I did give you my word, Sadia I don't know how much of this I can take.  What do I do when I need to go to the bathroom I'm all connected.  I explained we could disconnect and walk her to bathroom or we could use a bedpan.  She said oh no, Im not using a bedpan.  I told her okay we will disconnect you and walk you to restroom.

## 2018-08-12 NOTE — ASSESSMENT & PLAN NOTE
symptoms improving  Clear liquids, restart p.o. medications  If tolerate liquids will continued advance  Possible discharge tomorrow if improved

## 2018-08-12 NOTE — HOSPITAL COURSE
Hospital day 0 patient refusing NG tube, intermittent crampy abdominal pain, diarrhea  Hospital day 1 nausea and abdominal pain improved, diarrhea has subsided, passing flatus  Hospital day 2 abdominal pain, nausea improved. Tolerated clear liquids. Diet advanced  The she tolerated her diet was stable and ready for discharge

## 2018-08-12 NOTE — NURSING
Pt started complaining again of how uncomfortable the tube is and that she want is out now.  I was at bedside with patient and I started notifiying MD.  He asked if chloraseptic spray had been used and I said no but I will try.  She said I dont want no dam spray I've used that before and it dont work,  I want this tube out NOW.  He stated. I explained to patient that removal of the tube is against the doctors wishes and that without it, it may cause her to need surgery and she said I dont care if I get a colostomy but this tube has got to come out.

## 2018-08-12 NOTE — PLAN OF CARE
Problem: Patient Care Overview  Goal: Plan of Care Review  Outcome: Ongoing (interventions implemented as appropriate)  Plane of care reviewed with patient. A&Ox4 C/O nausea and headache /79 Physician notified, PRN medications ordered. BP improved with meds. Free from falls, nausea and headache impoved. Sinus Rhythm on tele, will continue to monitor.

## 2018-08-12 NOTE — NURSING
Pt admitted to room 208 from ED.  Pt is alert and oriented x 4.  20G saline lock intact to RH.  No edema nor erythema present at site.  Pt complaining of pain medication and screaming for dilaudid.  Introduced myself to patient and started orienting patient to room, call light and unit routine.  Asked pt about the order of placing the NG tube and cardiac monitor she said not at this time Im hurting.  I explained to her the pain she is experiencing could be associated with the disease processes.  She said I moved from [place] back her to die I dont want to here this stuff.

## 2018-08-12 NOTE — CONSULTS
[facility]  Consult Note    Patient Name: Su Callejas  MRN: [id number]  Admission Date: 8/10/2018  Hospital Length of Stay: 1 days  Attending Physician: Rakesh Alston MD   Primary Care Provider: Hasmukh Perdomo MD           Patient information was obtained from patient, past medical records and ER records.     Consults  Subjective:     Principal Problem: Small bowel obstruction    Chief Complaint:   Chief Complaint   Patient presents with    Nausea    Vomiting        HPI: Su Callejas is a 72 yo female with PMhx of CAD, Lupus, HTN, HPL and Bladder Cancer (last BCG treatment approx 6 weeks ago) who returned for cramping upper abdominal pain with vomiting/diarrhea and again found to have a small bowel obstruction. Pt was discharged on 8/5/18 for the same and treated conservatively. Also, pt prescribed doxycycline for MRSA UTI (only took 3 pills). Once the patient commenced to eating regular food the abdominal pain returned. Pt has had multiple prior abdominal surgeries including bowel obstruction surgery years ago. Pt refused NG tube during last hospitalization. The patient is admitted by General Surgery with Hospital Medicine consult for medical management. Currently, pt reports mild epigastric pain and denies any urinary symptoms.     Past Medical History:   Diagnosis Date    Bladder cancer     CAD (coronary artery disease)     Cancer     kidney    Cervical disc disease     Diabetes mellitus     Fibromyalgia     History of kidney cancer     Hyperlipidemia     Hypertension     Lumbar disc disease     Lupus     Malignant neoplasm of urinary bladder     Small bowel obstruction     Uncomplicated opioid dependence        Past Surgical History:   Procedure Laterality Date    COLON SURGERY      Bowel resection early 2018.       Review of patient's allergies indicates:   Allergen Reactions    Sulfa (sulfonamide antibiotics)      Throat closing       No current  facility-administered medications on file prior to encounter.      Current Outpatient Medications on File Prior to Encounter   Medication Sig    aspirin (ECOTRIN) 81 MG EC tablet Take 81 mg by mouth once daily.    doxycycline (MONODOX) 100 MG capsule Take 100 mg by mouth 2 (two) times daily.    ketoconazole (NIZORAL) 2 % cream Apply topically once daily.    levothyroxine (SYNTHROID) 25 MCG tablet Take 25 mcg by mouth once daily.    metoprolol tartrate (LOPRESSOR) 50 MG tablet Take 50 mg by mouth 2 (two) times daily.    multivitamin (ONE DAILY MULTIVITAMIN) per tablet Take 1 tablet by mouth once daily.    oxyCODONE (ROXICODONE) 15 MG Tab Take 10 mg by mouth every 4 (four) hours as needed for Pain.    pantoprazole (PROTONIX) 40 MG tablet Take 40 mg by mouth once daily.    pregabalin (LYRICA) 75 MG capsule Take 75 mg by mouth 2 (two) times daily.    simvastatin (ZOCOR) 20 MG tablet Take 20 mg by mouth every evening.    DULoxetine (CYMBALTA) 60 MG capsule Take 60 mg by mouth once daily.    nitroGLYCERIN (NITROSTAT) 0.3 MG SL tablet Place 0.3 mg under the tongue every 5 (five) minutes as needed for Chest pain.     Family History     None        Tobacco Use    Smoking status: Light Tobacco Smoker     Years: 25.00     Types: Cigarettes    Smokeless tobacco: Never Used   Substance and Sexual Activity    Alcohol use: No    Drug use: No    Sexual activity: No     Review of Systems   Constitutional: Positive for appetite change. Negative for chills, diaphoresis, fatigue and fever.   HENT: Negative.    Eyes: Negative for pain and redness.   Respiratory: Negative for cough and shortness of breath.    Cardiovascular: Negative for chest pain.   Gastrointestinal: Positive for abdominal pain, diarrhea, nausea and vomiting.   Genitourinary: Negative for dysuria, frequency and hematuria.   Musculoskeletal: Negative for arthralgias and myalgias.   Skin: Negative for pallor, rash and wound.   Neurological: Negative for  dizziness, light-headedness and headaches.   Psychiatric/Behavioral: Negative for confusion. The patient is not nervous/anxious.      Objective:     Vital Signs (Most Recent):  Temp: 98.1 °F (36.7 °C) (08/12/18 0751)  Pulse: (!) 58 (08/12/18 0751)  Resp: 18 (08/12/18 0751)  BP: (!) 160/66 (08/12/18 0751)  SpO2: 96 % (08/12/18 0751) Vital Signs (24h Range):  Temp:  [97.6 °F (36.4 °C)-98.6 °F (37 °C)] 98.1 °F (36.7 °C)  Pulse:  [55-78] 58  Resp:  [18-20] 18  SpO2:  [96 %-100 %] 96 %  BP: (130-192)/(61-90) 160/66     Weight: 61 kg (134 lb 7.7 oz)  Body mass index is 25.41 kg/m².    Physical Exam   Constitutional: She is oriented to person, place, and time. She appears well-developed and well-nourished.   HENT:   Head: Normocephalic and atraumatic.   Eyes: Conjunctivae are normal. No scleral icterus.   Neck: Normal range of motion. Neck supple.   Cardiovascular: Normal rate, regular rhythm and normal heart sounds. Exam reveals no gallop and no friction rub.   No murmur heard.  Pulmonary/Chest: Effort normal and breath sounds normal.   Abdominal: Soft. Bowel sounds are normal. There is tenderness (mild) in the epigastric area.   Musculoskeletal: Normal range of motion. She exhibits no edema or tenderness.   Neurological: She is alert and oriented to person, place, and time.   Skin: Skin is warm and dry.   Psychiatric: She has a normal mood and affect. Her behavior is normal.   Nursing note and vitals reviewed.      Significant Labs:   CBC:   Recent Labs   Lab  08/10/18   2106  08/11/18   0820  08/12/18   0521   WBC  15.44*  6.37  4.71   HGB  12.6  10.1*  10.6*   HCT  39.2  32.2*  33.8*   PLT  292  216  262     CMP:   Recent Labs   Lab  08/10/18   2106  08/11/18   0820  08/12/18   0521   NA  141  141  139   K  4.9  4.5  4.4   CL  103  112*  107   CO2  22*  19*  25   GLU  110  76  112*   BUN  26*  27*  18   CREATININE  1.6*  1.4  1.3   CALCIUM  10.0  8.4*  9.6   PROT  7.7   --    --    ALBUMIN  3.8   --    --    BILITOT   0.3   --    --    ALKPHOS  75   --    --    AST  21   --    --    ALT  15   --    --    ANIONGAP  16  10  7*   EGFRNONAA  32*  38*  41*     All pertinent labs within the past 24 hours have been reviewed.    Significant Imaging: I have reviewed all pertinent imaging results/findings within the past 24 hours.    Assessment/Plan:     * Small bowel obstruction    General Surgery following  OK for po meds  Clear liquid diet  Symptoms improving - possible discharge 8/12/18 as stated by General Surgery            Bacterial UTI    Urine culture from 8/3/18 isolated MRSA  Resume doxycycline          Essential hypertension    Continue metoprolol  Enalapril 1.25 mg IV every 6 hours prn SBP > 170          Type 2 diabetes mellitus    Glucose controlled  accuchecks every 6 hours          Bladder cancer    Was seeing a physician in [place] and getting BCG intravesicular treatment  Will need to follow up outpatient physician on discharge            VTE Risk Mitigation (From admission, onward)        Ordered     enoxaparin injection 40 mg  Daily      08/12/18 2477              Thank you for your consult. I will follow-up with patient. Please contact us if you have any additional questions.    Christina Echols NP  Department of Hospital Medicine   [facility]

## 2018-08-12 NOTE — SUBJECTIVE & OBJECTIVE
Past Medical History:   Diagnosis Date    Bladder cancer     CAD (coronary artery disease)     Cancer     kidney    Cervical disc disease     Diabetes mellitus     Fibromyalgia     History of kidney cancer     Hyperlipidemia     Hypertension     Lumbar disc disease     Lupus     Malignant neoplasm of urinary bladder     Small bowel obstruction     Uncomplicated opioid dependence        Past Surgical History:   Procedure Laterality Date    COLON SURGERY      Bowel resection early 2018.       Review of patient's allergies indicates:   Allergen Reactions    Sulfa (sulfonamide antibiotics)      Throat closing       No current facility-administered medications on file prior to encounter.      Current Outpatient Medications on File Prior to Encounter   Medication Sig    aspirin (ECOTRIN) 81 MG EC tablet Take 81 mg by mouth once daily.    doxycycline (MONODOX) 100 MG capsule Take 100 mg by mouth 2 (two) times daily.    ketoconazole (NIZORAL) 2 % cream Apply topically once daily.    levothyroxine (SYNTHROID) 25 MCG tablet Take 25 mcg by mouth once daily.    metoprolol tartrate (LOPRESSOR) 50 MG tablet Take 50 mg by mouth 2 (two) times daily.    multivitamin (ONE DAILY MULTIVITAMIN) per tablet Take 1 tablet by mouth once daily.    oxyCODONE (ROXICODONE) 15 MG Tab Take 10 mg by mouth every 4 (four) hours as needed for Pain.    pantoprazole (PROTONIX) 40 MG tablet Take 40 mg by mouth once daily.    pregabalin (LYRICA) 75 MG capsule Take 75 mg by mouth 2 (two) times daily.    simvastatin (ZOCOR) 20 MG tablet Take 20 mg by mouth every evening.    DULoxetine (CYMBALTA) 60 MG capsule Take 60 mg by mouth once daily.    nitroGLYCERIN (NITROSTAT) 0.3 MG SL tablet Place 0.3 mg under the tongue every 5 (five) minutes as needed for Chest pain.     Family History     None        Tobacco Use    Smoking status: Light Tobacco Smoker     Years: 25.00     Types: Cigarettes    Smokeless tobacco: Never Used    Substance and Sexual Activity    Alcohol use: No    Drug use: No    Sexual activity: No     Review of Systems   Constitutional: Positive for appetite change. Negative for chills, diaphoresis, fatigue and fever.   HENT: Negative.    Eyes: Negative for pain and redness.   Respiratory: Negative for cough and shortness of breath.    Cardiovascular: Negative for chest pain.   Gastrointestinal: Positive for abdominal pain, diarrhea, nausea and vomiting.   Genitourinary: Negative for dysuria, frequency and hematuria.   Musculoskeletal: Negative for arthralgias and myalgias.   Skin: Negative for pallor, rash and wound.   Neurological: Negative for dizziness, light-headedness and headaches.   Psychiatric/Behavioral: Negative for confusion. The patient is not nervous/anxious.      Objective:     Vital Signs (Most Recent):  Temp: 98.1 °F (36.7 °C) (08/12/18 0751)  Pulse: (!) 58 (08/12/18 0751)  Resp: 18 (08/12/18 0751)  BP: (!) 160/66 (08/12/18 0751)  SpO2: 96 % (08/12/18 0751) Vital Signs (24h Range):  Temp:  [97.6 °F (36.4 °C)-98.6 °F (37 °C)] 98.1 °F (36.7 °C)  Pulse:  [55-78] 58  Resp:  [18-20] 18  SpO2:  [96 %-100 %] 96 %  BP: (130-192)/(61-90) 160/66     Weight: 61 kg (134 lb 7.7 oz)  Body mass index is 25.41 kg/m².    Physical Exam   Constitutional: She is oriented to person, place, and time. She appears well-developed and well-nourished.   HENT:   Head: Normocephalic and atraumatic.   Eyes: Conjunctivae are normal. No scleral icterus.   Neck: Normal range of motion. Neck supple.   Cardiovascular: Normal rate, regular rhythm and normal heart sounds. Exam reveals no gallop and no friction rub.   No murmur heard.  Pulmonary/Chest: Effort normal and breath sounds normal.   Abdominal: Soft. Bowel sounds are normal. There is tenderness (mild) in the epigastric area.   Musculoskeletal: Normal range of motion. She exhibits no edema or tenderness.   Neurological: She is alert and oriented to person, place, and time.    Skin: Skin is warm and dry.   Psychiatric: She has a normal mood and affect. Her behavior is normal.   Nursing note and vitals reviewed.      Significant Labs:   CBC:   Recent Labs   Lab  08/10/18   2106  08/11/18   0820  08/12/18   0521   WBC  15.44*  6.37  4.71   HGB  12.6  10.1*  10.6*   HCT  39.2  32.2*  33.8*   PLT  292  216  262     CMP:   Recent Labs   Lab  08/10/18   2106  08/11/18   0820  08/12/18   0521   NA  141  141  139   K  4.9  4.5  4.4   CL  103  112*  107   CO2  22*  19*  25   GLU  110  76  112*   BUN  26*  27*  18   CREATININE  1.6*  1.4  1.3   CALCIUM  10.0  8.4*  9.6   PROT  7.7   --    --    ALBUMIN  3.8   --    --    BILITOT  0.3   --    --    ALKPHOS  75   --    --    AST  21   --    --    ALT  15   --    --    ANIONGAP  16  10  7*   EGFRNONAA  32*  38*  41*     All pertinent labs within the past 24 hours have been reviewed.    Significant Imaging: I have reviewed all pertinent imaging results/findings within the past 24 hours.

## 2018-08-12 NOTE — NURSING
I was summons to room by patient and she said let me take a nap and I will do it when I wake up if I dont feel any better.  She said but I still dont think I should have a tube cause Im having diarrhea.  I think yall just want to practice on me because I dont think I need one.   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  She said ok Sadia, Im going to let you do it when I wake up.

## 2018-08-12 NOTE — ASSESSMENT & PLAN NOTE
General Surgery following  OK for po meds  Clear liquid diet  Symptoms improving - possible discharge 8/12/18 as stated by General Surgery
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
## 2018-08-12 NOTE — HPI
Su Callejas is a 72 yo female with PMhx of CAD, Lupus, HTN, HPL and Bladder Cancer (last BCG treatment approx 6 weeks ago) who returned for cramping upper abdominal pain with vomiting/diarrhea and again found to have a small bowel obstruction. Pt was discharged on 8/5/18 for the same and treated conservatively. Also, pt prescribed doxycycline for MRSA UTI (only took 3 pills). Once the patient commenced to eating regular food the abdominal pain returned. Pt has had multiple prior abdominal surgeries including bowel obstruction surgery years ago. Pt refused NG tube during last hospitalization. The patient is admitted by General Surgery with Hospital Medicine consult for medical management. Currently, pt reports mild epigastric pain and denies any urinary symptoms.
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
## 2018-08-12 NOTE — NURSING
After getting patient connected to IV and administering pain medications.  I asked patient about inserting NG tube again.  Her response.  Oh no baby I had one before and baby it hurt so bad and I think it was wrong, I just cant tolerate that pain.  That pain is terrible, but if I have to I will because my brother said I should try it one more time.

## 2018-08-13 VITALS
DIASTOLIC BLOOD PRESSURE: 74 MMHG | HEART RATE: 65 BPM | WEIGHT: 135.13 LBS | TEMPERATURE: 97 F | HEIGHT: 61 IN | BODY MASS INDEX: 25.51 KG/M2 | SYSTOLIC BLOOD PRESSURE: 144 MMHG | RESPIRATION RATE: 18 BRPM | OXYGEN SATURATION: 96 %

## 2018-08-13 LAB
ANION GAP SERPL CALC-SCNC: 7 MMOL/L
BASOPHILS # BLD AUTO: 0.03 K/UL
BASOPHILS NFR BLD: 0.4 %
BUN SERPL-MCNC: 15 MG/DL
CALCIUM SERPL-MCNC: 9.6 MG/DL
CHLORIDE SERPL-SCNC: 108 MMOL/L
CO2 SERPL-SCNC: 21 MMOL/L
CREAT SERPL-MCNC: 1.4 MG/DL
DIFFERENTIAL METHOD: ABNORMAL
EOSINOPHIL # BLD AUTO: 0.4 K/UL
EOSINOPHIL NFR BLD: 5.6 %
ERYTHROCYTE [DISTWIDTH] IN BLOOD BY AUTOMATED COUNT: 18.3 %
EST. GFR  (AFRICAN AMERICAN): 43 ML/MIN/1.73 M^2
EST. GFR  (NON AFRICAN AMERICAN): 38 ML/MIN/1.73 M^2
GLUCOSE SERPL-MCNC: 96 MG/DL
HCT VFR BLD AUTO: 34.3 %
HGB BLD-MCNC: 10.9 G/DL
LYMPHOCYTES # BLD AUTO: 2.4 K/UL
LYMPHOCYTES NFR BLD: 32.8 %
MAGNESIUM SERPL-MCNC: 1.8 MG/DL
MCH RBC QN AUTO: 26.5 PG
MCHC RBC AUTO-ENTMCNC: 31.8 G/DL
MCV RBC AUTO: 84 FL
MONOCYTES # BLD AUTO: 0.8 K/UL
MONOCYTES NFR BLD: 10.8 %
NEUTROPHILS # BLD AUTO: 3.7 K/UL
NEUTROPHILS NFR BLD: 50.4 %
PHOSPHATE SERPL-MCNC: 3.9 MG/DL
PLATELET # BLD AUTO: 263 K/UL
PMV BLD AUTO: 10.2 FL
POCT GLUCOSE: 109 MG/DL (ref 70–110)
POCT GLUCOSE: 86 MG/DL (ref 70–110)
POCT GLUCOSE: 95 MG/DL (ref 70–110)
POTASSIUM SERPL-SCNC: 4.8 MMOL/L
RBC # BLD AUTO: 4.11 M/UL
SODIUM SERPL-SCNC: 136 MMOL/L
WBC # BLD AUTO: 7.29 K/UL

## 2018-08-13 PROCEDURE — 84100 ASSAY OF PHOSPHORUS: CPT

## 2018-08-13 PROCEDURE — 25000003 PHARM REV CODE 250: Performed by: NURSE PRACTITIONER

## 2018-08-13 PROCEDURE — 80048 BASIC METABOLIC PNL TOTAL CA: CPT

## 2018-08-13 PROCEDURE — 99231 SBSQ HOSP IP/OBS SF/LOW 25: CPT | Mod: ,,, | Performed by: PHYSICIAN ASSISTANT

## 2018-08-13 PROCEDURE — 83735 ASSAY OF MAGNESIUM: CPT

## 2018-08-13 PROCEDURE — 25000003 PHARM REV CODE 250: Performed by: SURGERY

## 2018-08-13 PROCEDURE — 85025 COMPLETE CBC W/AUTO DIFF WBC: CPT

## 2018-08-13 PROCEDURE — 36415 COLL VENOUS BLD VENIPUNCTURE: CPT

## 2018-08-13 RX ADMIN — PANTOPRAZOLE SODIUM 40 MG: 40 TABLET, DELAYED RELEASE ORAL at 08:08

## 2018-08-13 RX ADMIN — DULOXETINE 60 MG: 30 CAPSULE, DELAYED RELEASE ORAL at 08:08

## 2018-08-13 RX ADMIN — PREGABALIN 75 MG: 25 CAPSULE ORAL at 08:08

## 2018-08-13 RX ADMIN — ASPIRIN 81 MG: 81 TABLET, COATED ORAL at 08:08

## 2018-08-13 RX ADMIN — LEVOTHYROXINE SODIUM 25 MCG: 25 TABLET ORAL at 08:08

## 2018-08-13 RX ADMIN — METOPROLOL TARTRATE 50 MG: 50 TABLET, FILM COATED ORAL at 08:08

## 2018-08-13 RX ADMIN — DOXYCYCLINE HYCLATE 100 MG: 100 TABLET, COATED ORAL at 08:08

## 2018-08-13 NOTE — PLAN OF CARE
CM spoke with patient at the bedside regarding home health.  Patient states that she does not need home health at this time because she did not have surgery.  She is currently living with her son and will have his support at discharge.  No other needs at this time.

## 2018-08-13 NOTE — PROGRESS NOTES
[facility] -   General Surgery  Progress Note    Subjective:     History of Present Illness:  73-year-old female referred for small bowel obstruction.  The patient reports increasing crampy abdominal pain, nausea vomiting and diarrhea.  She was recently admitted to the hospital a week ago with similar symptoms.  After discharge she was on liquids and advance to regular food however she began having worsening symptoms on starting her regular food.  CT done in the ER concerning for small bowel obstruction near her prior anastomosis.  Patient has had multiple prior abdominal surgeries including bowel obstruction surgery years ago for which she had to have bowel resection.  She was refusing an NG tube in the ER.  Currently she is complaining of crampy abdominal pain in her upper abdomen.    Post-Op Info:  * No surgery found *         Interval History: no pain, no nausea or pain. Tolerated clear liquids. +flatus    Medications:  Continuous Infusions:   dextrose 5 % and 0.45 % NaCl with KCl 20 mEq 75 mL/hr at 08/12/18 2306     Scheduled Meds:   aspirin  81 mg Oral Daily    docusate sodium  100 mg Oral BID    doxycycline  100 mg Oral Q12H    DULoxetine  60 mg Oral Daily    enoxaparin  40 mg Subcutaneous Daily    levothyroxine  25 mcg Oral Daily    metoprolol tartrate  50 mg Oral BID    pantoprazole  40 mg Oral Daily    pregabalin  75 mg Oral BID    simvastatin  20 mg Oral QHS     PRN Meds:dextrose 50%, dextrose 50%, diphenhydrAMINE, enalaprilat, glucagon (human recombinant), glucagon (human recombinant), hydrALAZINE, insulin aspart U-100, morphine, morphine, ondansetron, promethazine (PHENERGAN) IVPB     Review of patient's allergies indicates:   Allergen Reactions    Sulfa (sulfonamide antibiotics)      Throat closing     Objective:     Vital Signs (Most Recent):  Temp: 98.1 °F (36.7 °C) (08/13/18 0723)  Pulse: (!) 53 (08/13/18 0723)  Resp: 16 (08/13/18 0723)  BP: (!) 148/72 (08/13/18  0723)  SpO2: 98 % (08/13/18 0723) Vital Signs (24h Range):  Temp:  [97.4 °F (36.3 °C)-98.2 °F (36.8 °C)] 98.1 °F (36.7 °C)  Pulse:  [53-68] 53  Resp:  [16-20] 16  SpO2:  [95 %-100 %] 98 %  BP: (131-177)/(66-75) 148/72     Weight: 61.3 kg (135 lb 2.3 oz)  Body mass index is 25.53 kg/m².    Intake/Output - Last 3 Shifts     ** Patient Encounter Information Not Found **          Physical Exam   Constitutional: She appears well-developed and well-nourished.   HENT:   Head: Normocephalic and atraumatic.   Eyes: EOM are normal.   Cardiovascular: Normal rate and regular rhythm.   Pulmonary/Chest: Effort normal. No respiratory distress.   Abdominal: Soft. Bowel sounds are normal. She exhibits no distension. There is no tenderness.   Musculoskeletal: Normal range of motion.   Neurological: She is alert.   Skin: Skin is warm and dry.   Psychiatric: She has a normal mood and affect. Thought content normal.   Vitals reviewed.      Significant Labs:  CBC:   Recent Labs   Lab  08/13/18   0455   WBC  7.29   RBC  4.11   HGB  10.9*   HCT  34.3*   PLT  263   MCV  84   MCH  26.5*   MCHC  31.8*     CMP:   Recent Labs   Lab  08/10/18   2106   08/13/18   0455   GLU  110   < >  96   CALCIUM  10.0   < >  9.6   ALBUMIN  3.8   --    --    PROT  7.7   --    --    NA  141   < >  136   K  4.9   < >  4.8   CO2  22*   < >  21*   CL  103   < >  108   BUN  26*   < >  15   CREATININE  1.6*   < >  1.4   ALKPHOS  75   --    --    ALT  15   --    --    AST  21   --    --    BILITOT  0.3   --    --     < > = values in this interval not displayed.       Significant Diagnostics:  I have reviewed all pertinent imaging results/findings within the past 24 hours.    Assessment/Plan:     * Small bowel obstruction    symptoms improving  Clear liquids, advance diet  Possible discharge today if improved        Essential hypertension    Home metoprolol        Type 2 diabetes mellitus    Insulin sliding scale        History of kidney cancer with remote unilateral  nephrectomy     Follow-up with oncologist outpatient        Bladder cancer    Follow-up with oncologist outpatient            Emily Alva PA-C  General Surgery  [facility]

## 2018-08-13 NOTE — DISCHARGE SUMMARY
[facility] -   General Surgery  Discharge Summary      Patient Name: Su Callejas  MRN: [id number]  Admission Date: 8/10/2018  Hospital Length of Stay: 2 days  Discharge Date and Time:  08/13/2018 3:02 PM  Attending Physician: Ezekiel Thompson MD   Discharging Provider: Ezekiel Thompson MD  Primary Care Provider: Hasmukh Perdomo MD    HPI:   73-year-old female referred for small bowel obstruction.  The patient reports increasing crampy abdominal pain, nausea vomiting and diarrhea.  She was recently admitted to the hospital a week ago with similar symptoms.  After discharge she was on liquids and advance to regular food however she began having worsening symptoms on starting her regular food.  CT done in the ER concerning for small bowel obstruction near her prior anastomosis.  Patient has had multiple prior abdominal surgeries including bowel obstruction surgery years ago for which she had to have bowel resection.  She was refusing an NG tube in the ER.  Currently she is complaining of crampy abdominal pain in her upper abdomen.    * No surgery found *      Indwelling Lines/Drains at time of discharge:   Lines/Drains/Airways          None        Hospital Course: Hospital day 0 patient refusing NG tube, intermittent crampy abdominal pain, diarrhea  Hospital day 1 nausea and abdominal pain improved, diarrhea has subsided, passing flatus  Hospital day 2 abdominal pain, nausea improved. Tolerated clear liquids. Diet advanced  The she tolerated her diet was stable and ready for discharge    Consults:   Consults (From admission, onward)        Status Ordering Provider     Inpatient consult to Internal Medicine  Once     Provider:  Steven Vences MD    Acknowledged EZEKIEL THOMPSON          Significant Diagnostic Studies: none    Pending Diagnostic Studies:     Procedure Component Value Units Date/Time    CT Abdomen Pelvis With Contrast [[id number]] Resulted:  08/10/18 2201    Order Status:  Sent Lab Status:  In  process Updated:  08/10/18 2201        Final Active Diagnoses:    Diagnosis Date Noted POA    PRINCIPAL PROBLEM:  Small bowel obstruction [K56.609] 08/11/2018 Yes    Bacterial UTI [N39.0, A49.9] 08/12/2018 Unknown    Essential hypertension [I10] 08/04/2018 Yes     Chronic    Type 2 diabetes mellitus [E11.9] 08/04/2018 Yes     Chronic    History of kidney cancer with remote unilateral nephrectomy  [Z90.5] 08/04/2018 Not Applicable     Chronic    Bladder cancer [C67.9] 08/04/2018 Yes     Chronic      Problems Resolved During this Admission:      Discharged Condition: good    Disposition:  home    Follow Up:  Follow-up Information     Hasmukh Perdomo MD In 1 week.    Specialty:  Family Medicine  Contact information:  [facility]  [address] [place] [address] [id number] [phone]                 Patient Instructions:   No discharge procedures on file.  Medications:  Reconciled Home Medications:      Medication List      CONTINUE taking these medications    aspirin 81 MG EC tablet  Commonly known as:  ECOTRIN  Take 81 mg by mouth once daily.     doxycycline 100 MG capsule  Commonly known as:  MONODOX  Take 100 mg by mouth 2 (two) times daily.     DULoxetine 60 MG capsule  Commonly known as:  CYMBALTA  Take 60 mg by mouth once daily.     ketoconazole 2 % cream  Commonly known as:  NIZORAL  Apply topically once daily.     levothyroxine 25 MCG tablet  Commonly known as:  SYNTHROID  Take 25 mcg by mouth once daily.     metoprolol tartrate 50 MG tablet  Commonly known as:  LOPRESSOR  Take 50 mg by mouth 2 (two) times daily.     nitroGLYCERIN 0.3 MG SL tablet  Commonly known as:  NITROSTAT  Place 0.3 mg under the tongue every 5 (five) minutes as needed for Chest pain.     ONE DAILY MULTIVITAMIN per tablet  Generic drug:  multivitamin  Take 1 tablet by mouth once daily.     oxyCODONE 15 MG Tab  Commonly known as:  ROXICODONE  Take 10 mg by mouth every 4 (four) hours as needed for Pain.     pantoprazole 40 MG  tablet  Commonly known as:  PROTONIX  Take 40 mg by mouth once daily.     pregabalin 75 MG capsule  Commonly known as:  LYRICA  Take 75 mg by mouth 2 (two) times daily.     simvastatin 20 MG tablet  Commonly known as:  ZOCOR  Take 20 mg by mouth every evening.          Time spent on the discharge of patient: 30 minutes    Rakesh Alston MD  General Surgery  [facility]

## 2018-08-13 NOTE — SUBJECTIVE & OBJECTIVE
Interval History: no pain, no nausea or pain. Tolerated clear liquids. +flatus    Medications:  Continuous Infusions:   dextrose 5 % and 0.45 % NaCl with KCl 20 mEq 75 mL/hr at 08/12/18 2306     Scheduled Meds:   aspirin  81 mg Oral Daily    docusate sodium  100 mg Oral BID    doxycycline  100 mg Oral Q12H    DULoxetine  60 mg Oral Daily    enoxaparin  40 mg Subcutaneous Daily    levothyroxine  25 mcg Oral Daily    metoprolol tartrate  50 mg Oral BID    pantoprazole  40 mg Oral Daily    pregabalin  75 mg Oral BID    simvastatin  20 mg Oral QHS     PRN Meds:dextrose 50%, dextrose 50%, diphenhydrAMINE, enalaprilat, glucagon (human recombinant), glucagon (human recombinant), hydrALAZINE, insulin aspart U-100, morphine, morphine, ondansetron, promethazine (PHENERGAN) IVPB     Review of patient's allergies indicates:   Allergen Reactions    Sulfa (sulfonamide antibiotics)      Throat closing     Objective:     Vital Signs (Most Recent):  Temp: 98.1 °F (36.7 °C) (08/13/18 0723)  Pulse: (!) 53 (08/13/18 0723)  Resp: 16 (08/13/18 0723)  BP: (!) 148/72 (08/13/18 0723)  SpO2: 98 % (08/13/18 0723) Vital Signs (24h Range):  Temp:  [97.4 °F (36.3 °C)-98.2 °F (36.8 °C)] 98.1 °F (36.7 °C)  Pulse:  [53-68] 53  Resp:  [16-20] 16  SpO2:  [95 %-100 %] 98 %  BP: (131-177)/(66-75) 148/72     Weight: 61.3 kg (135 lb 2.3 oz)  Body mass index is 25.53 kg/m².    Intake/Output - Last 3 Shifts     ** Patient Encounter Information Not Found **          Physical Exam   Constitutional: She appears well-developed and well-nourished.   HENT:   Head: Normocephalic and atraumatic.   Eyes: EOM are normal.   Cardiovascular: Normal rate and regular rhythm.   Pulmonary/Chest: Effort normal. No respiratory distress.   Abdominal: Soft. Bowel sounds are normal. She exhibits no distension. There is no tenderness.   Musculoskeletal: Normal range of motion.   Neurological: She is alert.   Skin: Skin is warm and dry.   Psychiatric: She has a normal  mood and affect. Thought content normal.   Vitals reviewed.      Significant Labs:  CBC:   Recent Labs   Lab  08/13/18   0455   WBC  7.29   RBC  4.11   HGB  10.9*   HCT  34.3*   PLT  263   MCV  84   MCH  26.5*   MCHC  31.8*     CMP:   Recent Labs   Lab  08/10/18   2106   08/13/18   0455   GLU  110   < >  96   CALCIUM  10.0   < >  9.6   ALBUMIN  3.8   --    --    PROT  7.7   --    --    NA  141   < >  136   K  4.9   < >  4.8   CO2  22*   < >  21*   CL  103   < >  108   BUN  26*   < >  15   CREATININE  1.6*   < >  1.4   ALKPHOS  75   --    --    ALT  15   --    --    AST  21   --    --    BILITOT  0.3   --    --     < > = values in this interval not displayed.       Significant Diagnostics:  I have reviewed all pertinent imaging results/findings within the past 24 hours.

## 2018-08-13 NOTE — PLAN OF CARE
Problem: Patient Care Overview  Goal: Plan of Care Review  Outcome: Ongoing (interventions implemented as appropriate)  Patient discharged home, vital signs stable, no N/V, no complaints of pain, discharge instructions reviewed with the patient, waiting on her son pick her up.

## 2018-08-13 NOTE — PLAN OF CARE
08/13/18 1551   Medicare Message   Important Message from Medicare regarding Discharge Appeal Rights Given to patient/caregiver;Explained to patient/caregiver;Signed/date by patient/caregiver   Date IMM was signed 08/13/18   Time IMM was signed [id number]

## 2018-08-14 NOTE — PLAN OF CARE
08/14/18 0757   Final Note   Assessment Type Final Discharge Note   Discharge Disposition Home   Right Care Referral Info   Post Acute Recommendation No Care

## 2018-08-21 NOTE — PHYSICIAN QUERY
PT Name: Su Callejas  MR #: [id number]    Physician Query Form - Cause and Effect Relationship Clarification      CDS/: Brandy E Capley               Contact information:  Spectralink:  [phone]    This form is a permanent document in the medical record.     Query Date: August 21, 2018    By submitting this query, we are merely seeking further clarification of documentation. Please utilize your independent clinical judgment when addressing the question(s) below.    The Medical record contains the following:  Supporting Clinical Findings   Location in record      PRINCIPAL PROBLEM:   Small bowel obstruction       CT done in the ER concerning for small bowel obstruction near her prior anastomosis.  Patient has had multiple prior abdominal surgeries including bowel obstruction surgery years ago for which she had to have bowel resection.                                                                                                                                                                                  Discharge summary 8/13                                   Impression    1. There are multiple dilated loops of small bowel in the expected location of the jejunum and ileum.  There are mild inflammatory changes adjacent to these dilated loops of small bowel. In the expected location of the midportion of the ilium there is a loop of small bowel with a diameter of 3.7 cm.  On the prior examination this loop of bowel was not dilated and had a diameter of 2.2 cm.  This is characteristic of an enteritis or small-bowel obstruction.                                                                                                                                                               CT abd 8/11         Provider, please clarify if there is any correlation between _____Small bowel obstruction________ and ______anastomosis_______.           Are the conditions:     [  ] Due to or associated with each  other     [  ] Unrelated to each other     [  ] Other (Please Specify): _________________________     [ X ] Clinically Undetermined

## 2018-12-12 NOTE — NURSING
After gathering supplies to place the NG tube, pt had changed her mind.  She states I dont care what my brother says this is my body and I cant tolerate the pain.  Baby it just hurts so bad going down and I feel nauseated like something want to come up, I just cant put myself through that.     Immediate family member

## 2019-06-26 ENCOUNTER — HOSPITAL ENCOUNTER (EMERGENCY)
Facility: HOSPITAL | Age: 74
Discharge: HOME | End: 2019-06-26
Attending: EMERGENCY MEDICINE
Payer: MEDICARE

## 2019-06-26 VITALS
HEIGHT: 62 IN | OXYGEN SATURATION: 96 % | SYSTOLIC BLOOD PRESSURE: 149 MMHG | WEIGHT: 154.69 LBS | TEMPERATURE: 99 F | DIASTOLIC BLOOD PRESSURE: 69 MMHG | HEART RATE: 55 BPM | RESPIRATION RATE: 16 BRPM | BODY MASS INDEX: 28.47 KG/M2

## 2019-06-26 DIAGNOSIS — R10.13 EPIGASTRIC PAIN: ICD-10-CM

## 2019-06-26 DIAGNOSIS — K29.70 GASTRITIS WITHOUT BLEEDING, UNSPECIFIED CHRONICITY, UNSPECIFIED GASTRITIS TYPE: Primary | ICD-10-CM

## 2019-06-26 LAB
ALBUMIN SERPL BCP-MCNC: 4 G/DL (ref 3.5–5.2)
ALP SERPL-CCNC: 82 U/L (ref 55–135)
ALT SERPL W/O P-5'-P-CCNC: 13 U/L (ref 10–44)
ANION GAP SERPL CALC-SCNC: 12 MMOL/L (ref 8–16)
AST SERPL-CCNC: 19 U/L (ref 10–40)
BASOPHILS # BLD AUTO: 0.02 K/UL (ref 0–0.2)
BASOPHILS NFR BLD: 0.3 % (ref 0–1.9)
BILIRUB SERPL-MCNC: 0.5 MG/DL (ref 0.1–1)
BILIRUB UR QL STRIP: NEGATIVE
BUN SERPL-MCNC: 20 MG/DL (ref 8–23)
CALCIUM SERPL-MCNC: 10.4 MG/DL (ref 8.7–10.5)
CHLORIDE SERPL-SCNC: 105 MMOL/L (ref 95–110)
CLARITY UR: CLEAR
CO2 SERPL-SCNC: 22 MMOL/L (ref 23–29)
COLOR UR: YELLOW
CREAT SERPL-MCNC: 1.6 MG/DL (ref 0.5–1.4)
DIFFERENTIAL METHOD: ABNORMAL
EOSINOPHIL # BLD AUTO: 0.2 K/UL (ref 0–0.5)
EOSINOPHIL NFR BLD: 2.7 % (ref 0–8)
ERYTHROCYTE [DISTWIDTH] IN BLOOD BY AUTOMATED COUNT: 14.7 % (ref 11.5–14.5)
EST. GFR  (AFRICAN AMERICAN): 37 ML/MIN/1.73 M^2
EST. GFR  (NON AFRICAN AMERICAN): 32 ML/MIN/1.73 M^2
GLUCOSE SERPL-MCNC: 110 MG/DL (ref 70–110)
GLUCOSE UR QL STRIP: NEGATIVE
HCT VFR BLD AUTO: 41.2 % (ref 37–48.5)
HGB BLD-MCNC: 13.7 G/DL (ref 12–16)
HGB UR QL STRIP: NEGATIVE
KETONES UR QL STRIP: NEGATIVE
LEUKOCYTE ESTERASE UR QL STRIP: NEGATIVE
LIPASE SERPL-CCNC: 17 U/L (ref 4–60)
LYMPHOCYTES # BLD AUTO: 1.5 K/UL (ref 1–4.8)
LYMPHOCYTES NFR BLD: 22.3 % (ref 18–48)
MCH RBC QN AUTO: 29.9 PG (ref 27–31)
MCHC RBC AUTO-ENTMCNC: 33.3 G/DL (ref 32–36)
MCV RBC AUTO: 90 FL (ref 82–98)
MONOCYTES # BLD AUTO: 0.4 K/UL (ref 0.3–1)
MONOCYTES NFR BLD: 5.6 % (ref 4–15)
NEUTROPHILS # BLD AUTO: 4.7 K/UL (ref 1.8–7.7)
NEUTROPHILS NFR BLD: 69.2 % (ref 38–73)
NITRITE UR QL STRIP: NEGATIVE
PH UR STRIP: 6 [PH] (ref 5–8)
PLATELET # BLD AUTO: 199 K/UL (ref 150–350)
PMV BLD AUTO: 10.8 FL (ref 9.2–12.9)
POTASSIUM SERPL-SCNC: 4.9 MMOL/L (ref 3.5–5.1)
PROT SERPL-MCNC: 7.6 G/DL (ref 6–8.4)
PROT UR QL STRIP: NEGATIVE
RBC # BLD AUTO: 4.58 M/UL (ref 4–5.4)
SODIUM SERPL-SCNC: 139 MMOL/L (ref 136–145)
SP GR UR STRIP: 1.02 (ref 1–1.03)
URN SPEC COLLECT METH UR: NORMAL
UROBILINOGEN UR STRIP-ACNC: NEGATIVE EU/DL
WBC # BLD AUTO: 6.76 K/UL (ref 3.9–12.7)

## 2019-06-26 PROCEDURE — 99285 EMERGENCY DEPT VISIT HI MDM: CPT | Mod: 25

## 2019-06-26 PROCEDURE — 86803 HEPATITIS C AB TEST: CPT

## 2019-06-26 PROCEDURE — 96365 THER/PROPH/DIAG IV INF INIT: CPT

## 2019-06-26 PROCEDURE — 63600175 PHARM REV CODE 636 W HCPCS: Performed by: EMERGENCY MEDICINE

## 2019-06-26 PROCEDURE — 96375 TX/PRO/DX INJ NEW DRUG ADDON: CPT | Mod: 59

## 2019-06-26 PROCEDURE — 83690 ASSAY OF LIPASE: CPT

## 2019-06-26 PROCEDURE — 85025 COMPLETE CBC W/AUTO DIFF WBC: CPT

## 2019-06-26 PROCEDURE — 81003 URINALYSIS AUTO W/O SCOPE: CPT

## 2019-06-26 PROCEDURE — S0028 INJECTION, FAMOTIDINE, 20 MG: HCPCS | Performed by: EMERGENCY MEDICINE

## 2019-06-26 PROCEDURE — 80053 COMPREHEN METABOLIC PANEL: CPT

## 2019-06-26 PROCEDURE — 93005 ELECTROCARDIOGRAM TRACING: CPT

## 2019-06-26 PROCEDURE — 25000003 PHARM REV CODE 250: Performed by: EMERGENCY MEDICINE

## 2019-06-26 PROCEDURE — 93010 ELECTROCARDIOGRAM REPORT: CPT | Mod: ,,, | Performed by: INTERNAL MEDICINE

## 2019-06-26 PROCEDURE — 93010 EKG 12-LEAD: ICD-10-PCS | Mod: ,,, | Performed by: INTERNAL MEDICINE

## 2019-06-26 RX ORDER — PROMETHAZINE HYDROCHLORIDE 25 MG/1
25 TABLET ORAL EVERY 6 HOURS PRN
Qty: 15 TABLET | Refills: 0 | Status: SHIPPED | OUTPATIENT
Start: 2019-06-26

## 2019-06-26 RX ORDER — ONDANSETRON 2 MG/ML
4 INJECTION INTRAMUSCULAR; INTRAVENOUS
Status: COMPLETED | OUTPATIENT
Start: 2019-06-26 | End: 2019-06-26

## 2019-06-26 RX ORDER — SUCRALFATE 1 G/10ML
1 SUSPENSION ORAL
Status: COMPLETED | OUTPATIENT
Start: 2019-06-26 | End: 2019-06-26

## 2019-06-26 RX ORDER — FAMOTIDINE 10 MG/ML
20 INJECTION INTRAVENOUS
Status: COMPLETED | OUTPATIENT
Start: 2019-06-26 | End: 2019-06-26

## 2019-06-26 RX ORDER — RANOLAZINE 500 MG/1
500 TABLET, EXTENDED RELEASE ORAL 2 TIMES DAILY
COMMUNITY

## 2019-06-26 RX ADMIN — SUCRALFATE 1 G: 1 SUSPENSION ORAL at 11:06

## 2019-06-26 RX ADMIN — FAMOTIDINE 20 MG: 10 INJECTION INTRAVENOUS at 10:06

## 2019-06-26 RX ADMIN — ONDANSETRON 4 MG: 2 INJECTION INTRAMUSCULAR; INTRAVENOUS at 10:06

## 2019-06-26 RX ADMIN — PROMETHAZINE HYDROCHLORIDE 25 MG: 25 INJECTION INTRAMUSCULAR; INTRAVENOUS at 11:06

## 2019-06-26 NOTE — ED PROVIDER NOTES
SCRIBE #1 NOTE: I, Livia Couch, am scribing for, and in the presence of, Chelsey Garza MD. I have scribed the entire note.       History     Chief Complaint   Patient presents with    Nausea     Patient states she has had nausea x 4 days.     Review of patient's allergies indicates:   Allergen Reactions    Sulfa (sulfonamide antibiotics)      Throat closing         History of Present Illness     HPI    6/26/2019, 9:37 AM  History obtained from the patient      History of Present Illness: Su Callejas is a 73 y.o. female patient with a PMHx of bladder cancer, CAD, DM, HLD, and HTN who presents to the Emergency Department for evaluation of abdominal pain which onset gradually 4 days ago. Symptoms are constant and moderate in severity. No mitigating or exacerbating factors reported. Associated sxs include n/v. Patient denies any fever, chills, constipation, hematochezia, dysuria, hematuria, urinary frequency, and all other sxs at this time. No prior Tx. Patient note she only takes Protonix as needed. Last BM was this morning. No further complaints or concerns at this time. '      Arrival mode: EMS    PCP: Hasmukh Perdomo MD        Past Medical History:  Past Medical History:   Diagnosis Date    Bladder cancer     CAD (coronary artery disease)     Cancer     kidney    Cervical disc disease     CHF (congestive heart failure)     Diabetes mellitus     Fibromyalgia     History of kidney cancer     History of kidney cancer     Hyperlipidemia     Hypertension     Lumbar disc disease     Lupus     Malignant neoplasm of urinary bladder     Small bowel obstruction     Uncomplicated opioid dependence        Past Surgical History:  Past Surgical History:   Procedure Laterality Date    CARDIAC CATHETERIZATION      COLON SURGERY      Bowel resection early 2018.    LAPAROSCOPIC ROBOT-ASSISTED SURGICAL REMOVAL OF KIDNEY USING DA IRMA XI Left          Family History:  History reviewed. No  pertinent family history.    Social History:  Social History     Tobacco Use    Smoking status: Light Tobacco Smoker     Years: 25.00     Types: Cigarettes    Smokeless tobacco: Never Used   Substance and Sexual Activity    Alcohol use: No    Drug use: No    Sexual activity: Never        Review of Systems     Review of Systems   Constitutional: Negative for activity change, appetite change, chills, diaphoresis, fatigue and fever.   HENT: Negative for congestion, ear pain, nosebleeds, rhinorrhea, sinus pain, sore throat and trouble swallowing.    Eyes: Negative for pain and discharge.   Respiratory: Negative for cough, chest tightness, shortness of breath, wheezing and stridor.    Cardiovascular: Negative for chest pain, palpitations and leg swelling.   Gastrointestinal: Positive for abdominal pain, nausea and vomiting. Negative for abdominal distention, blood in stool, constipation and diarrhea.   Genitourinary: Negative for difficulty urinating, dysuria, flank pain, frequency, hematuria and urgency.   Musculoskeletal: Negative for arthralgias, back pain, myalgias and neck pain.   Skin: Negative for pallor, rash and wound.   Neurological: Negative for dizziness, syncope, weakness, light-headedness, numbness and headaches.   Hematological: Does not bruise/bleed easily.   Psychiatric/Behavioral: Negative for confusion and self-injury.   All other systems reviewed and are negative.     Physical Exam     Initial Vitals [06/26/19 0928]   BP Pulse Resp Temp SpO2   (!) 181/79 64 16 97.9 °F (36.6 °C) 98 %      MAP       --          Physical Exam  Nursing Notes and Vital Signs Reviewed.  Constitutional: Patient is in no acute distress. Well-developed and well-nourished.  Head: Atraumatic. Normocephalic.  Eyes: PERRL. EOM intact. Conjunctivae are not pale. No scleral icterus.  ENT: Mucous membranes are moist. Oropharynx is clear and symmetric.    Neck: Supple. Full ROM. No lymphadenopathy.  Cardiovascular: Regular rate.  "Regular rhythm. No murmurs, rubs, or gallops. Distal pulses are 2+ and symmetric.  Pulmonary/Chest: No respiratory distress. Clear to auscultation bilaterally. No wheezing or rales.  Abdominal: Soft and non-distended.  There is no tenderness.  No rebound, guarding, or rigidity. Good bowel sounds.  Genitourinary: No CVA tenderness  Musculoskeletal: Moves all extremities. No obvious deformities. No edema. No calf tenderness.  Skin: Warm and dry.  Neurological:  Alert, awake, and appropriate.  Normal speech.  No acute focal neurological deficits are appreciated.  Psychiatric: Normal affect. Good eye contact. Appropriate in content.     ED Course   Procedures  ED Vital Signs:  Vitals:    06/26/19 0928 06/26/19 0935 06/26/19 1140 06/26/19 1210   BP: (!) 181/79 (!) 181/79 (!) 173/77 (!) 149/69   Pulse: 64 60 (!) 57 (!) 55   Resp: 16 16 16 16   Temp: 97.9 °F (36.6 °C)   98.6 °F (37 °C)   TempSrc: Oral   Oral   SpO2: 98% 98% 97% 96%   Weight: 70.2 kg (154 lb 11.2 oz)      Height: 5' 2" (1.575 m)          Abnormal Lab Results:  Labs Reviewed   CBC W/ AUTO DIFFERENTIAL - Abnormal; Notable for the following components:       Result Value    RDW 14.7 (*)     All other components within normal limits   COMPREHENSIVE METABOLIC PANEL - Abnormal; Notable for the following components:    CO2 22 (*)     Creatinine 1.6 (*)     eGFR if  37 (*)     eGFR if non  32 (*)     All other components within normal limits   LIPASE   URINALYSIS, REFLEX TO URINE CULTURE    Narrative:     Preferred Collection Type->Urine, Clean Catch   HEPATITIS C ANTIBODY        All Lab Results:  Results for orders placed or performed during the hospital encounter of 06/26/19   CBC W/ AUTO DIFFERENTIAL   Result Value Ref Range    WBC 6.76 3.90 - 12.70 K/uL    RBC 4.58 4.00 - 5.40 M/uL    Hemoglobin 13.7 12.0 - 16.0 g/dL    Hematocrit 41.2 37.0 - 48.5 %    Mean Corpuscular Volume 90 82 - 98 fL    Mean Corpuscular Hemoglobin 29.9 27.0 " - 31.0 pg    Mean Corpuscular Hemoglobin Conc 33.3 32.0 - 36.0 g/dL    RDW 14.7 (H) 11.5 - 14.5 %    Platelets 199 150 - 350 K/uL    MPV 10.8 9.2 - 12.9 fL    Gran # (ANC) 4.7 1.8 - 7.7 K/uL    Lymph # 1.5 1.0 - 4.8 K/uL    Mono # 0.4 0.3 - 1.0 K/uL    Eos # 0.2 0.0 - 0.5 K/uL    Baso # 0.02 0.00 - 0.20 K/uL    Gran% 69.2 38.0 - 73.0 %    Lymph% 22.3 18.0 - 48.0 %    Mono% 5.6 4.0 - 15.0 %    Eosinophil% 2.7 0.0 - 8.0 %    Basophil% 0.3 0.0 - 1.9 %    Differential Method Automated    Comp. Metabolic Panel   Result Value Ref Range    Sodium 139 136 - 145 mmol/L    Potassium 4.9 3.5 - 5.1 mmol/L    Chloride 105 95 - 110 mmol/L    CO2 22 (L) 23 - 29 mmol/L    Glucose 110 70 - 110 mg/dL    BUN, Bld 20 8 - 23 mg/dL    Creatinine 1.6 (H) 0.5 - 1.4 mg/dL    Calcium 10.4 8.7 - 10.5 mg/dL    Total Protein 7.6 6.0 - 8.4 g/dL    Albumin 4.0 3.5 - 5.2 g/dL    Total Bilirubin 0.5 0.1 - 1.0 mg/dL    Alkaline Phosphatase 82 55 - 135 U/L    AST 19 10 - 40 U/L    ALT 13 10 - 44 U/L    Anion Gap 12 8 - 16 mmol/L    eGFR if African American 37 (A) >60 mL/min/1.73 m^2    eGFR if non African American 32 (A) >60 mL/min/1.73 m^2   Lipase   Result Value Ref Range    Lipase 17 4 - 60 U/L   Urinalysis, Reflex to Urine Culture Urine, Clean Catch   Result Value Ref Range    Specimen UA Urine, Clean Catch     Color, UA Yellow Yellow, Straw, Rena    Appearance, UA Clear Clear    pH, UA 6.0 5.0 - 8.0    Specific Gravity, UA 1.020 1.005 - 1.030    Protein, UA Negative Negative    Glucose, UA Negative Negative    Ketones, UA Negative Negative    Bilirubin (UA) Negative Negative    Occult Blood UA Negative Negative    Nitrite, UA Negative Negative    Urobilinogen, UA Negative <2.0 EU/dL    Leukocytes, UA Negative Negative         Imaging Results:  Imaging Results          X-Ray Abdomen Flat And Erect (Final result)  Result time 06/26/19 10:33:20    Final result by Vinh Lanier MD (06/26/19 10:33:20)                 Impression:      No acute  abnormality.      Electronically signed by: Vinh Lanier MD  Date:    06/26/2019  Time:    10:33             Narrative:    EXAMINATION:  XR ABDOMEN FLAT AND ERECT    CLINICAL HISTORY:  Abdominal Pain;    TECHNIQUE:  2 View of the abdomen was performed.    COMPARISON:  None    FINDINGS:  Nonobstructive bowel gas pattern.  Mild constipation.    No obvious free air.  No portal venous gas.  Clips are seen within mid abdomen and upper pelvis.    No acute fracture. Mild DJD. Lung bases are clear.                                 The EKG was ordered, reviewed, and independently interpreted by the ED provider.  Interpretation time: 10:21  Rate: 55 BPM  Rhythm: sinus bradycardia  Interpretation: No acute ST changes. No STEMI.           The Emergency Provider reviewed the vital signs and test results, which are outlined above.     ED Discussion     11:56 AM: Reassessed pt at this time.  Pt states her condition has improved at this time. Advised pt to restart Protonix. Discussed with pt all pertinent ED information and results. Discussed pt dx and plan of tx. Gave pt all f/u and return to the ED instructions. All questions and concerns were addressed at this time. Pt expresses understanding of information and instructions, and is comfortable with plan to discharge. Pt is stable for discharge.    I discussed with patient and/or family/caretaker that evaluation in the ED does not suggest any emergent or life threatening medical conditions requiring immediate intervention beyond what was provided in the ED, and I believe patient is safe for discharge.  Regardless, an unremarkable evaluation in the ED does not preclude the development or presence of a serious of life threatening condition. As such, patient was instructed to return immediately for any worsening or change in current symptoms.        ED Medication(s):  Medications   ondansetron injection 4 mg (4 mg Intravenous Given 6/26/19 1005)   famotidine (PF) injection 20 mg (20  mg Intravenous Given 6/26/19 1005)   sucralfate 100 mg/mL suspension 1 g (1 g Oral Given 6/26/19 1135)   promethazine (PHENERGAN) 25 mg in dextrose 5 % 50 mL IVPB (0 mg Intravenous Stopped 6/26/19 1150)       New Prescriptions    No medications       Follow-up Information     [facility]. Schedule an appointment as soon as possible for a visit in 2 days.    Specialty:  Gastroenterology  Why:  Return to the Emergency Room, If symptoms worsen  Contact information:  [id number] [place] [id number] [phone]                       Medical Decision Making:   Clinical Tests:   Lab Tests: Ordered and Reviewed  Radiological Study: Ordered and Reviewed  Medical Tests: Ordered and Reviewed             Scribe Attestation:   Scribe #1: I performed the above scribed service and the documentation accurately describes the services I performed. I attest to the accuracy of the note.     Attending:   Physician Attestation Statement for Scribe #1: I, Chelsey Garza MD, personally performed the services described in this documentation, as scribed by Livia Couch, in my presence, and it is both accurate and complete.           Clinical Impression       ICD-10-CM ICD-9-CM   1. Gastritis without bleeding, unspecified chronicity, unspecified gastritis type K29.70 535.50   2. Epigastric pain R10.13 789.06       Disposition:   Disposition: Discharged  Condition: Stable         Chelsey Garza MD  06/26/19 1745

## 2019-06-26 NOTE — ED NOTES
Advanced Directive given to RN by daughter. Advanced Directive copied and placed in patient`s chart at this time. Original Advanced Directive returned to daughter.

## 2019-06-26 NOTE — ED NOTES
Patient c/o nausea and epigastric pain x 4 days.    Patient moved to ED room 13, patient assisted onto stretcher and changed into a gown. Patient placed on continuous pulse oximetry and automatic blood pressure cuff. Bed placed in low locked position, side rails up x 2, call light is within reach of patient or family, orientation to room and explanation of wait provided to family and patient, alarms set and turned on for monitor and pulse ox, awaiting MD evaluation and orders, will continue to monitor.    Patient identifies self as Su Callejas      LOC: The patient is awake, alert and aware of environment with an appropriate affect, the patient is oriented x 3 and speaking appropriately.  APPEARANCE: Patient resting comfortably and in no acute distress, patient is clean and well groomed, patient's clothing is properly fastened.  SKIN: The skin is warm and dry, color consistent with ethnicity, patient has normal skin turgor and moist mucus membranes, skin intact, no breakdown or bruising noted. Surgical scar noted to left lateral side of right wrist.  MUSCULOSKELETAL: Patient moving all extremities well, no obvious swelling or deformities noted. Velcro wrist splint noted to right wrist. Patient states she had surgery for carpel tunnel and was told to wear the splint for recovery after surgery.    RESPIRATORY: Airway is open and patent, respirations are spontaneous, patient has a normal effort and rate, no accessory muscle use noted.  CARDIAC: Patient has a normal rate and rhythm, no peripheral edema noted, capillary refill < 3 seconds.  ABDOMEN: Soft, tenderness on palpation to LUQ and epigastric region, no distention noted.  NEUROLOGIC: PERRL, 3 mm bilaterally, eyes open spontaneously, behavior appropriate to situation, follows commands, facial expression symmetrical, bilateral hand grasp equal and even, purposeful motor response noted, normal sensation in all extremities when touched with a finger.

## 2019-06-26 NOTE — ED NOTES
EKG delayed at this time due to patient rolling around in bed, complaining of pain and itching, and refusing to stay still. Will try again when patient is more comfortable.

## 2019-06-27 LAB — HCV AB SERPL QL IA: NEGATIVE

## 2020-09-10 ENCOUNTER — TELEPHONE (OUTPATIENT)
Dept: GASTROENTEROLOGY | Facility: CLINIC | Age: 75
End: 2020-09-10

## 2020-09-10 NOTE — TELEPHONE ENCOUNTER
----- Message from Glenny Gamez sent at 9/10/2020 10:20 AM CDT -----  Contact: pt  Pt is returning call to the clinic         Please contact [phone] or Lea (daughter) [phone]

## 2020-09-11 ENCOUNTER — TELEPHONE (OUTPATIENT)
Dept: GASTROENTEROLOGY | Facility: CLINIC | Age: 75
End: 2020-09-11

## 2020-09-11 NOTE — TELEPHONE ENCOUNTER
Called pt and scheduled NP appointment for 10/16/20 at 10 am. Discussed with pt that Dr. Tillman is a consultant who will send them back to their general GI provider, Dr. Abraham, once their symptoms improved and plan of care is established. Pt was told the logistics of the appointment (arrival time, length of visit, and total time spent at facility). Pt was also told that Dr. Tillman will review their previous workup but may order additional test and perform her own procedures and that this may require an overnight stay at a local hotel to complete the workup.

## 2020-09-14 ENCOUNTER — HOSPITAL ENCOUNTER (EMERGENCY)
Facility: HOSPITAL | Age: 75
Discharge: HOME | End: 2020-09-14
Attending: EMERGENCY MEDICINE
Payer: MEDICARE

## 2020-09-14 VITALS
HEART RATE: 71 BPM | OXYGEN SATURATION: 97 % | DIASTOLIC BLOOD PRESSURE: 81 MMHG | TEMPERATURE: 98 F | SYSTOLIC BLOOD PRESSURE: 176 MMHG | RESPIRATION RATE: 18 BRPM

## 2020-09-14 DIAGNOSIS — R10.30 LOWER ABDOMINAL PAIN: ICD-10-CM

## 2020-09-14 DIAGNOSIS — R19.7 DIARRHEA, UNSPECIFIED TYPE: ICD-10-CM

## 2020-09-14 DIAGNOSIS — N83.201 RIGHT OVARIAN CYST: ICD-10-CM

## 2020-09-14 DIAGNOSIS — K52.9 CHRONIC COLITIS: Primary | ICD-10-CM

## 2020-09-14 LAB
ALBUMIN SERPL BCP-MCNC: 3.8 G/DL (ref 3.5–5.2)
ALP SERPL-CCNC: 67 U/L (ref 55–135)
ALT SERPL W/O P-5'-P-CCNC: 9 U/L (ref 10–44)
ANION GAP SERPL CALC-SCNC: 15 MMOL/L (ref 8–16)
AST SERPL-CCNC: 13 U/L (ref 10–40)
BASOPHILS # BLD AUTO: 0.02 K/UL (ref 0–0.2)
BASOPHILS NFR BLD: 0.4 % (ref 0–1.9)
BILIRUB SERPL-MCNC: 0.4 MG/DL (ref 0.1–1)
BILIRUB UR QL STRIP: NEGATIVE
BUN SERPL-MCNC: 9 MG/DL (ref 8–23)
CALCIUM SERPL-MCNC: 10 MG/DL (ref 8.7–10.5)
CHLORIDE SERPL-SCNC: 101 MMOL/L (ref 95–110)
CLARITY UR: CLEAR
CO2 SERPL-SCNC: 25 MMOL/L (ref 23–29)
COLOR UR: YELLOW
CREAT SERPL-MCNC: 1.5 MG/DL (ref 0.5–1.4)
DIFFERENTIAL METHOD: ABNORMAL
EOSINOPHIL # BLD AUTO: 0.2 K/UL (ref 0–0.5)
EOSINOPHIL NFR BLD: 2.9 % (ref 0–8)
ERYTHROCYTE [DISTWIDTH] IN BLOOD BY AUTOMATED COUNT: 16.5 % (ref 11.5–14.5)
EST. GFR  (AFRICAN AMERICAN): 39 ML/MIN/1.73 M^2
EST. GFR  (NON AFRICAN AMERICAN): 34 ML/MIN/1.73 M^2
GLUCOSE SERPL-MCNC: 110 MG/DL (ref 70–110)
GLUCOSE UR QL STRIP: NEGATIVE
HCT VFR BLD AUTO: 31.9 % (ref 37–48.5)
HGB BLD-MCNC: 9.5 G/DL (ref 12–16)
HGB UR QL STRIP: ABNORMAL
IMM GRANULOCYTES # BLD AUTO: 0.02 K/UL (ref 0–0.04)
IMM GRANULOCYTES NFR BLD AUTO: 0.4 % (ref 0–0.5)
KETONES UR QL STRIP: NEGATIVE
LEUKOCYTE ESTERASE UR QL STRIP: NEGATIVE
LIPASE SERPL-CCNC: 11 U/L (ref 4–60)
LYMPHOCYTES # BLD AUTO: 1.2 K/UL (ref 1–4.8)
LYMPHOCYTES NFR BLD: 21.8 % (ref 18–48)
MCH RBC QN AUTO: 24 PG (ref 27–31)
MCHC RBC AUTO-ENTMCNC: 29.8 G/DL (ref 32–36)
MCV RBC AUTO: 81 FL (ref 82–98)
MONOCYTES # BLD AUTO: 0.5 K/UL (ref 0.3–1)
MONOCYTES NFR BLD: 9.7 % (ref 4–15)
NEUTROPHILS # BLD AUTO: 3.6 K/UL (ref 1.8–7.7)
NEUTROPHILS NFR BLD: 64.8 % (ref 38–73)
NITRITE UR QL STRIP: NEGATIVE
NRBC BLD-RTO: 0 /100 WBC
PH UR STRIP: 6 [PH] (ref 5–8)
PLATELET # BLD AUTO: 276 K/UL (ref 150–350)
PMV BLD AUTO: 10.2 FL (ref 9.2–12.9)
POTASSIUM SERPL-SCNC: 4.4 MMOL/L (ref 3.5–5.1)
PROT SERPL-MCNC: 7.5 G/DL (ref 6–8.4)
PROT UR QL STRIP: NEGATIVE
RBC # BLD AUTO: 3.96 M/UL (ref 4–5.4)
SODIUM SERPL-SCNC: 141 MMOL/L (ref 136–145)
SP GR UR STRIP: 1.01 (ref 1–1.03)
URN SPEC COLLECT METH UR: ABNORMAL
UROBILINOGEN UR STRIP-ACNC: NEGATIVE EU/DL
WBC # BLD AUTO: 5.47 K/UL (ref 3.9–12.7)

## 2020-09-14 PROCEDURE — 63600175 PHARM REV CODE 636 W HCPCS: Performed by: EMERGENCY MEDICINE

## 2020-09-14 PROCEDURE — 25000003 PHARM REV CODE 250: Performed by: EMERGENCY MEDICINE

## 2020-09-14 PROCEDURE — S0030 INJECTION, METRONIDAZOLE: HCPCS | Performed by: EMERGENCY MEDICINE

## 2020-09-14 PROCEDURE — 96366 THER/PROPH/DIAG IV INF ADDON: CPT

## 2020-09-14 PROCEDURE — 96361 HYDRATE IV INFUSION ADD-ON: CPT

## 2020-09-14 PROCEDURE — 81003 URINALYSIS AUTO W/O SCOPE: CPT

## 2020-09-14 PROCEDURE — 83690 ASSAY OF LIPASE: CPT

## 2020-09-14 PROCEDURE — 99284 EMERGENCY DEPT VISIT MOD MDM: CPT | Mod: 25

## 2020-09-14 PROCEDURE — 96367 TX/PROPH/DG ADDL SEQ IV INF: CPT

## 2020-09-14 PROCEDURE — 80053 COMPREHEN METABOLIC PANEL: CPT

## 2020-09-14 PROCEDURE — 85025 COMPLETE CBC W/AUTO DIFF WBC: CPT

## 2020-09-14 PROCEDURE — 96365 THER/PROPH/DIAG IV INF INIT: CPT

## 2020-09-14 PROCEDURE — 96375 TX/PRO/DX INJ NEW DRUG ADDON: CPT

## 2020-09-14 RX ORDER — METRONIDAZOLE 500 MG/100ML
500 INJECTION, SOLUTION INTRAVENOUS
Status: COMPLETED | OUTPATIENT
Start: 2020-09-14 | End: 2020-09-14

## 2020-09-14 RX ORDER — CIPROFLOXACIN 2 MG/ML
400 INJECTION, SOLUTION INTRAVENOUS
Status: COMPLETED | OUTPATIENT
Start: 2020-09-14 | End: 2020-09-14

## 2020-09-14 RX ORDER — MORPHINE SULFATE 4 MG/ML
4 INJECTION, SOLUTION INTRAMUSCULAR; INTRAVENOUS
Status: DISCONTINUED | OUTPATIENT
Start: 2020-09-14 | End: 2020-09-14 | Stop reason: HOSPADM

## 2020-09-14 RX ORDER — ONDANSETRON 2 MG/ML
4 INJECTION INTRAMUSCULAR; INTRAVENOUS
Status: COMPLETED | OUTPATIENT
Start: 2020-09-14 | End: 2020-09-14

## 2020-09-14 RX ORDER — HYDROCODONE BITARTRATE AND ACETAMINOPHEN 5; 325 MG/1; MG/1
1 TABLET ORAL
Status: COMPLETED | OUTPATIENT
Start: 2020-09-14 | End: 2020-09-14

## 2020-09-14 RX ORDER — CIPROFLOXACIN 500 MG/1
500 TABLET ORAL 2 TIMES DAILY
Qty: 10 TABLET | Refills: 0 | Status: ON HOLD | OUTPATIENT
Start: 2020-09-14 | End: 2020-09-20 | Stop reason: HOSPADM

## 2020-09-14 RX ORDER — MORPHINE SULFATE 4 MG/ML
4 INJECTION, SOLUTION INTRAMUSCULAR; INTRAVENOUS
Status: COMPLETED | OUTPATIENT
Start: 2020-09-14 | End: 2020-09-14

## 2020-09-14 RX ORDER — HYDROCODONE BITARTRATE AND ACETAMINOPHEN 10; 325 MG/1; MG/1
1 TABLET ORAL EVERY 6 HOURS PRN
Qty: 10 TABLET | Refills: 0 | Status: SHIPPED | OUTPATIENT
Start: 2020-09-14

## 2020-09-14 RX ORDER — METRONIDAZOLE 500 MG/1
500 TABLET ORAL 3 TIMES DAILY
Qty: 21 TABLET | Refills: 0 | Status: ON HOLD | OUTPATIENT
Start: 2020-09-14 | End: 2020-09-20 | Stop reason: HOSPADM

## 2020-09-14 RX ADMIN — CIPROFLOXACIN 400 MG: 2 INJECTION, SOLUTION INTRAVENOUS at 04:09

## 2020-09-14 RX ADMIN — METRONIDAZOLE 500 MG: 500 INJECTION, SOLUTION INTRAVENOUS at 07:09

## 2020-09-14 RX ADMIN — MORPHINE SULFATE 4 MG: 4 INJECTION, SOLUTION INTRAMUSCULAR; INTRAVENOUS at 06:09

## 2020-09-14 RX ADMIN — SODIUM CHLORIDE 1000 ML: 0.9 INJECTION, SOLUTION INTRAVENOUS at 03:09

## 2020-09-14 RX ADMIN — ONDANSETRON 4 MG: 2 INJECTION INTRAMUSCULAR; INTRAVENOUS at 03:09

## 2020-09-14 RX ADMIN — HYDROCODONE BITARTRATE AND ACETAMINOPHEN 1 TABLET: 5; 325 TABLET ORAL at 07:09

## 2020-09-14 NOTE — ED PROVIDER NOTES
SCRIBE #1 NOTE: IJim, am scribing for, and in the presence of, Rahat Casiano MD. I have scribed the HPI, ROS, and PEx.     SCRIBE #2 NOTE: I, Brittany Singleton, am scribing for, and in the presence of,  Vinh Rollins MD. I have scribed the remaining portions of the note not scribed by Scribe #1.     History      Chief Complaint   Patient presents with    Abdominal Pain     c/o LLQ abd pain radiating to L flank/back x3 wks associated with N/V/D. Pt has been seen @ [facility] ER 3 times in the past week and dc'd. Hx of Bladder ca. Onc is Dr. Canales.         Review of patient's allergies indicates:   Allergen Reactions    Contrast media      IV contrast    Promethazine     Sulfa (sulfonamide antibiotics)      Throat closing        HPI   HPI    9/14/2020, 2:32 PM   History obtained from the patient      History of Present Illness: Su Callejas is a 75 y.o. female patient who presents to the Emergency Department for generalized abdominal pain, onset 3 weeks PTA. Pt has presented to [facility] ED 3 times in the past week for the same complaint, and has been discharged. Symptoms are constant and moderate in severity. No mitigating or exacerbating factors reported. Associated sxs include diarrhea. Patient denies any fever, chills, n/v, SOB, CP, weakness, numbness, dizziness, headache, and all other sxs at this time. No prior Tx reported. No further complaints or concerns at this time.     Arrival mode: EMS    PCP: Rashawn Ortega DO       Past Medical History:  Past Medical History:   Diagnosis Date    Bladder cancer     CAD (coronary artery disease)     Cancer     kidney    Cervical disc disease     CHF (congestive heart failure)     Diabetes mellitus     Fibromyalgia     History of kidney cancer     History of kidney cancer     Hyperlipidemia     Hypertension     Lumbar disc disease     Lupus     Malignant neoplasm of urinary bladder     Small bowel obstruction     Uncomplicated opioid  dependence        Past Surgical History:  Past Surgical History:   Procedure Laterality Date    CARDIAC CATHETERIZATION      COLON SURGERY      Bowel resection early 2018.    LAPAROSCOPIC ROBOT-ASSISTED SURGICAL REMOVAL OF KIDNEY USING DA IRMA XI Left          Family History:  History reviewed. No pertinent family history.     Social History:  Social History     Tobacco Use    Smoking status: Former Smoker     Years: 25.00     Types: Cigarettes    Smokeless tobacco: Never Used   Substance and Sexual Activity    Alcohol use: No    Drug use: No    Sexual activity: Not Currently       ROS   Review of Systems   Constitutional: Negative for chills, diaphoresis, fatigue and fever.   HENT: Negative for sore throat.    Respiratory: Negative for shortness of breath.    Cardiovascular: Negative for chest pain.   Gastrointestinal: Positive for abdominal pain (generalized) and diarrhea. Negative for nausea and vomiting.   Genitourinary: Negative for dysuria.   Musculoskeletal: Negative for back pain.   Skin: Negative for rash.   Neurological: Negative for dizziness, seizures, weakness, light-headedness, numbness and headaches.   Hematological: Does not bruise/bleed easily.   All other systems reviewed and are negative.    Physical Exam      Initial Vitals [09/14/20 1425]   BP Pulse Resp Temp SpO2   105/86 110 (!) 22 98.2 °F (36.8 °C) 99 %      MAP       --          Physical Exam  Nursing Notes and Vital Signs Reviewed.  Constitutional: Patient is in mild distress. Well-developed and well-nourished.  Head: Atraumatic. Normocephalic.  Eyes: PERRL. EOM intact. Conjunctivae are not pale. No scleral icterus.  ENT: Mucous membranes are moist. Oropharynx is clear and symmetric.    Neck: Supple. Full ROM. No lymphadenopathy.  Cardiovascular: Regular rate. Regular rhythm. No murmurs, rubs, or gallops. Distal pulses are 2+ and symmetric.  Pulmonary/Chest: No respiratory distress. Clear to auscultation bilaterally. No wheezing or  rales.  Abdominal: Soft and non-distended.  There is no tenderness.  No rebound, guarding, or rigidity.  Musculoskeletal: Moves all extremities. No obvious deformities. No edema.  Skin: Warm and dry.  Neurological:  Alert, awake, and appropriate.  Normal speech.  No acute focal neurological deficits are appreciated.  Psychiatric: Normal affect. Good eye contact. Appropriate in content.    ED Course    Procedures  ED Vital Signs:  Vitals:    09/14/20 1650 09/14/20 1702 09/14/20 1712 09/14/20 1721   BP:  (!) 183/81     Pulse: 78 77 71 81   Resp:       Temp:       TempSrc:       SpO2: 100% 100% 100% 99%    09/14/20 1729 09/14/20 1732 09/14/20 1746 09/14/20 1756   BP:  (!) 211/79     Pulse: 84 76 71 73   Resp:       Temp:       TempSrc:       SpO2: 100% 100% 98% 99%    09/14/20 1803 09/14/20 1811 09/14/20 1822 09/14/20 1825   BP: (!) 227/98  (!) 209/94    Pulse: 81 86 74    Resp:    18   Temp:       TempSrc:       SpO2: 99% 100% 99%     09/14/20 1832 09/14/20 1843 09/14/20 1850   BP: (!) 151/60     Pulse: 74 73 82   Resp:      Temp:      TempSrc:      SpO2: 97% 98% 98%       Abnormal Lab Results:  Labs Reviewed   CBC W/ AUTO DIFFERENTIAL - Abnormal; Notable for the following components:       Result Value    RBC 3.96 (*)     Hemoglobin 9.5 (*)     Hematocrit 31.9 (*)     Mean Corpuscular Volume 81 (*)     Mean Corpuscular Hemoglobin 24.0 (*)     Mean Corpuscular Hemoglobin Conc 29.8 (*)     RDW 16.5 (*)     All other components within normal limits   COMPREHENSIVE METABOLIC PANEL - Abnormal; Notable for the following components:    Creatinine 1.5 (*)     ALT 9 (*)     eGFR if  39 (*)     eGFR if non  34 (*)     All other components within normal limits   URINALYSIS, REFLEX TO URINE CULTURE - Abnormal; Notable for the following components:    Occult Blood UA Trace (*)     All other components within normal limits    Narrative:     Specimen Source->Urine   LIPASE        All Lab  Results:  Results for orders placed or performed during the hospital encounter of 09/14/20   CBC auto differential   Result Value Ref Range    WBC 5.47 3.90 - 12.70 K/uL    RBC 3.96 (L) 4.00 - 5.40 M/uL    Hemoglobin 9.5 (L) 12.0 - 16.0 g/dL    Hematocrit 31.9 (L) 37.0 - 48.5 %    Mean Corpuscular Volume 81 (L) 82 - 98 fL    Mean Corpuscular Hemoglobin 24.0 (L) 27.0 - 31.0 pg    Mean Corpuscular Hemoglobin Conc 29.8 (L) 32.0 - 36.0 g/dL    RDW 16.5 (H) 11.5 - 14.5 %    Platelets 276 150 - 350 K/uL    MPV 10.2 9.2 - 12.9 fL    Immature Granulocytes 0.4 0.0 - 0.5 %    Gran # (ANC) 3.6 1.8 - 7.7 K/uL    Immature Grans (Abs) 0.02 0.00 - 0.04 K/uL    Lymph # 1.2 1.0 - 4.8 K/uL    Mono # 0.5 0.3 - 1.0 K/uL    Eos # 0.2 0.0 - 0.5 K/uL    Baso # 0.02 0.00 - 0.20 K/uL    nRBC 0 0 /100 WBC    Gran% 64.8 38.0 - 73.0 %    Lymph% 21.8 18.0 - 48.0 %    Mono% 9.7 4.0 - 15.0 %    Eosinophil% 2.9 0.0 - 8.0 %    Basophil% 0.4 0.0 - 1.9 %    Differential Method Automated    Comprehensive metabolic panel   Result Value Ref Range    Sodium 141 136 - 145 mmol/L    Potassium 4.4 3.5 - 5.1 mmol/L    Chloride 101 95 - 110 mmol/L    CO2 25 23 - 29 mmol/L    Glucose 110 70 - 110 mg/dL    BUN, Bld 9 8 - 23 mg/dL    Creatinine 1.5 (H) 0.5 - 1.4 mg/dL    Calcium 10.0 8.7 - 10.5 mg/dL    Total Protein 7.5 6.0 - 8.4 g/dL    Albumin 3.8 3.5 - 5.2 g/dL    Total Bilirubin 0.4 0.1 - 1.0 mg/dL    Alkaline Phosphatase 67 55 - 135 U/L    AST 13 10 - 40 U/L    ALT 9 (L) 10 - 44 U/L    Anion Gap 15 8 - 16 mmol/L    eGFR if African American 39 (A) >60 mL/min/1.73 m^2    eGFR if non African American 34 (A) >60 mL/min/1.73 m^2   Urinalysis, Reflex to Urine Culture Urine, Clean Catch    Specimen: Urine   Result Value Ref Range    Specimen UA Urine, Clean Catch     Color, UA Yellow Yellow, Straw, Rena    Appearance, UA Clear Clear    pH, UA 6.0 5.0 - 8.0    Specific Gravity, UA 1.010 1.005 - 1.030    Protein, UA Negative Negative    Glucose, UA Negative  Negative    Ketones, UA Negative Negative    Bilirubin (UA) Negative Negative    Occult Blood UA Trace (A) Negative    Nitrite, UA Negative Negative    Urobilinogen, UA Negative <2.0 EU/dL    Leukocytes, UA Negative Negative   Lipase   Result Value Ref Range    Lipase 11 4 - 60 U/L         Imaging Results:  Imaging Results          CT Renal Stone Study ABD Pelvis WO (Final result)  Result time 09/14/20 15:26:02    Final result by Aldo Castellano MD (09/14/20 15:26:02)                 Impression:      1.  Mild wall thickening of the ascending colon.  Could the patient's symptoms be related to colitis?  There are no surrounding inflammatory changes.    2.  Negative for acute process involving the abdomen or pelvis otherwise.  Negative for inflammatory changes.  Negative for right renal stone disease or hydronephrosis.  Negative for free air.    3. Small area of medial right lower lobe atelectasis/consolidation.  Early infectious process difficult to exclude in the right clinical setting.  Clinical correlation is advised.    4.  Moderate distal esophageal thickening and dilation, concerning for esophagitis.  Clinical correlation is advised.  Moderate size hiatal hernia noted as well.    5.  3.1 cm right ovarian cyst.  In a postmenopausal female, this can be normal if the patient is on hormone replacement therapy.  Clinical correlation is advised.    6.  Gallbladder sludge.    7.  Numerous stable findings as noted above, to include prior left nephrectomy as well as postoperative changes to the transverse colon.    All CT scans at this facility are performed  using dose modulation techniques as appropriate to performed exam including the following:  automated exposure control; adjustment of mA and/or kV according to the patients size (this includes techniques or standardized protocols for targeted exams where dose is matched to indication/reason for exam: i.e. extremities or head);  iterative reconstruction  technique.      Electronically signed by: Aldo Castellano MD  Date:    09/14/2020  Time:    15:26             Narrative:    EXAMINATION:  CT RENAL STONE STUDY ABD PELVIS WO, multiplanar reconstructions    CLINICAL HISTORY:  Flank pain, kidney stone suspected;    TECHNIQUE:  Axial images through the abdomen and pelvis were obtained without the use of IV contrast.  Sagittal and coronal reconstructions are provided for review.  Oral contrast was not utilized.    COMPARISON:  August 11, 2018    FINDINGS:  LUNG BASES: Small focus of atelectasis/consolidation in the medial right lower lobe.  Lung bases are otherwise clear.  Negative for pleural or pericardial effusions. The distal esophagus is thickened and mildly dilated and associated with a hiatal hernia.  Coronary artery calcifications again seen.    ABDOMEN: The left kidney is absent, either surgically or congenitally.  Bowel fills the left renal fossa.  There multiple clips adjacent to the inferior pole of the right kidney and right hemiabdomen, with some areas of fat necrosis within the right hemiabdomen..  Negative for right hydronephrosis or renal stones.  Negative for right renal masses.    There is a small amount of gallbladder sludge.  The liver, spleen and gallbladder otherwise appear normal.  The pancreas is unremarkable.  The adrenal glands are normal.    Negative for adenopathy, free fluid or inflammatory change noted within the abdomen or pelvis.  Vascular calcifications are present without aneurysmal changes.    Mild wall thickening of the ascending colon.  There are postoperative changes to the transverse colon.  Scattered colonic diverticula.  Cecum is medially position within the anterior pelvis.  Large and small bowel loops otherwise appear normal.  I do not see a normal or abnormal appendix.    PELVIS: The urinary bladder is unremarkable.  3.1 cm right ovarian cyst.  The female pelvic organs are otherwise normal.  There are pelvic phleboliths.    No  significant osseous abnormality is identified.  Negative for significant spinal canal stenosis. There is a similar degree of multilevel marginal spondylosis, degenerative facet arthropathy and osteopenia.  Mild degenerative changes of the hip joints again seen.    Negative for groin adenopathy.    There are postoperative changes to the anterior abdominal wall.  The abdominal wall is intact.                                        The Emergency Provider reviewed the vital signs and test results, which are outlined above.    ED Discussion     4:00 PM: Dr. Casiano transfers care of patient to Dr. Rollins pending lab results.    6:53 PM: Re-evaluated pt. Pt is resting comfortably and is in no acute distress.  Pt states that she is feeling better. Pt reports that she was recently prescribed hydrocodone 5 which is not enough. States she used to be on a higher dose and is requesting a refill of her hydrocodone 10. Pt is also reporting 4-5 episodes of diarrhea per day. D/w pt all pertinent results. D/w pt any concerns expressed at this time. Answered all questions. Pt expresses understanding at this time.    6:58 PM: Reassessed pt at this time. Discussed with pt all pertinent ED information and results. Discussed pt dx and plan of tx. Gave pt all f/u and return to the ED instructions. All questions and concerns were addressed at this time. Pt expresses understanding of information and instructions, and is comfortable with plan to discharge. Pt is stable for discharge.    I discussed with patient and/or family/caretaker that evaluation in the ED does not suggest any emergent or life threatening medical conditions requiring immediate intervention beyond what was provided in the ED, and I believe patient is safe for discharge.  Regardless, an unremarkable evaluation in the ED does not preclude the development or presence of a serious of life threatening condition. As such, patient was instructed to return immediately for any  worsening or change in current symptoms.           ED Medication(s):  Medications   metronidazole IVPB 500 mg (has no administration in time range)   morphine injection 4 mg (4 mg Intramuscular Not Given 9/14/20 1815)   HYDROcodone-acetaminophen 5-325 mg per tablet 1 tablet (has no administration in time range)   ondansetron injection 4 mg (4 mg Intravenous Given 9/14/20 1534)   sodium chloride 0.9% bolus 1,000 mL (1,000 mLs Intravenous New Bag 9/14/20 1535)   ciprofloxacin (CIPRO)400mg/200ml D5W IVPB 400 mg (400 mg Intravenous New Bag 9/14/20 1647)   morphine injection 4 mg (4 mg Intravenous Given 9/14/20 1825)       Follow-up Information     Schedule an appointment as soon as possible for a visit  with Sangeeta Capps MD.    Specialties: Obstetrics, Obstetrics and Gynecology  Contact information:  [address]  [place] [address]  [phone]             [facility] - .    Specialty: Emergency Medicine  Why: As needed, If symptoms worsen  Contact information:  [address]  [facility] [address] [phone]                New Prescriptions    CIPROFLOXACIN HCL (CIPRO) 500 MG TABLET    Take 1 tablet (500 mg total) by mouth 2 (two) times daily. for 7 days    HYDROCODONE-ACETAMINOPHEN (NORCO)  MG PER TABLET    Take 1 tablet by mouth every 6 (six) hours as needed for Pain.    METRONIDAZOLE (FLAGYL) 500 MG TABLET    Take 1 tablet (500 mg total) by mouth 3 (three) times daily. for 7 days         Medical Decision Making    Medical Decision Making:   Clinical Tests:   Lab Tests: Ordered and Reviewed  Radiological Study: Ordered and Reviewed    75-year-old female initially evaluated by another ED provider and care handed off to me with results pending.  On my discussion with patient and family, patient has been experiencing chronic abdominal pain.  Family states the pain has been present for years, patient states the pain has been different for the past month.    Patient has been evaluated at multiple ED visits at our [facility] and has had multiple CT scans.  Chart review shows patient has a diagnosis of chronic colitis.  Colitis is on CT today.  Patient does report 4-5 episodes of diarrhea per day which is new for the past month.  Denies any vomiting.  Denies any fever.  Denies any blood in the stool. She has not been on any antibiotics this past month.  Cipro and Flagyl given here in the emergency department.  Patient discharged home on antibiotics.  Patient has also been advised recently to seek evaluation from pain management.  Patient used to be on opioids daily, but has since stopped.  Will prescribe short-term prescription for Norco along with antibiotics and advised outpatient follow-up.  She was noted to have an ovarian cyst and advised OBGYN follow-up as well.  ER return precautions provided.  Patient discharged home in improved condition           Scribe Attestation:   Scribe #1: I performed the above scribed service and the documentation accurately describes the services I performed. I attest to the accuracy of the note.    Attending:   Physician Attestation Statement for Scribe #1: I, Rahat Casiano MD, personally performed the services described in this documentation, as scribed by Jim Conde, in my presence, and it is both accurate and complete.       Scribe Attestation:   Scribe #2: I performed the above scribed service and the documentation accurately describes the services I performed. I attest to the accuracy of the note.    Attending Attestation:           Physician Attestation for Scribe:    Physician Attestation Statement for Scribe #2: I, Vinh Rollins MD, reviewed documentation, as scribed by Brittany Singleton in my presence, and it is both accurate and complete. I also acknowledge and confirm the content of the note done by Deanibe #1.          Clinical Impression       ICD-10-CM ICD-9-CM   1. Chronic colitis  K52.9 558.9   2. Right ovarian  cyst  N83.201 620.2   3. Lower abdominal pain  R10.30 789.09   4. Diarrhea, unspecified type  R19.7 787.91       Disposition:   Disposition: Discharged  Condition: Stable         Vinh Rollins MD  09/14/20 1915

## 2020-09-14 NOTE — ED NOTES
Pt. Resting in bed. Repeated requests for pain meds, RR equal and non labored, VSS. Bed in low, locked, and call light in reach. Side rails up X 2. Son at the bedside. Will continue to monitor.

## 2020-09-16 ENCOUNTER — HOSPITAL ENCOUNTER (OUTPATIENT)
Facility: HOSPITAL | Age: 75
Discharge: HOME HEALTH | End: 2020-09-20
Attending: EMERGENCY MEDICINE | Admitting: INTERNAL MEDICINE
Payer: MEDICARE

## 2020-09-16 ENCOUNTER — OFFICE VISIT (OUTPATIENT)
Dept: OBSTETRICS AND GYNECOLOGY | Facility: CLINIC | Age: 75
End: 2020-09-16
Payer: MEDICARE

## 2020-09-16 VITALS
SYSTOLIC BLOOD PRESSURE: 178 MMHG | WEIGHT: 142 LBS | BODY MASS INDEX: 26.13 KG/M2 | DIASTOLIC BLOOD PRESSURE: 91 MMHG | HEIGHT: 62 IN

## 2020-09-16 DIAGNOSIS — N17.9 AKI (ACUTE KIDNEY INJURY): ICD-10-CM

## 2020-09-16 DIAGNOSIS — K52.9 GASTROENTERITIS: ICD-10-CM

## 2020-09-16 DIAGNOSIS — R10.9 CHRONIC ABDOMINAL PAIN: ICD-10-CM

## 2020-09-16 DIAGNOSIS — Z01.419 ENCOUNTER FOR CERVICAL PAP SMEAR WITH PELVIC EXAM: ICD-10-CM

## 2020-09-16 DIAGNOSIS — K52.9 COLITIS: ICD-10-CM

## 2020-09-16 DIAGNOSIS — R11.2 INTRACTABLE VOMITING WITH NAUSEA, UNSPECIFIED VOMITING TYPE: Primary | ICD-10-CM

## 2020-09-16 DIAGNOSIS — G89.29 CHRONIC ABDOMINAL PAIN: ICD-10-CM

## 2020-09-16 PROBLEM — N39.0 UTI (URINARY TRACT INFECTION): Status: ACTIVE | Noted: 2020-09-16

## 2020-09-16 PROBLEM — I25.10 CORONARY ARTERY DISEASE INVOLVING NATIVE CORONARY ARTERY: Status: ACTIVE | Noted: 2020-09-16

## 2020-09-16 PROBLEM — E86.0 DEHYDRATION: Status: ACTIVE | Noted: 2020-09-16

## 2020-09-16 LAB
ALBUMIN SERPL BCP-MCNC: 4 G/DL (ref 3.5–5.2)
ALP SERPL-CCNC: 72 U/L (ref 55–135)
ALT SERPL W/O P-5'-P-CCNC: 10 U/L (ref 10–44)
ANION GAP SERPL CALC-SCNC: 13 MMOL/L (ref 8–16)
AST SERPL-CCNC: 15 U/L (ref 10–40)
BACTERIA #/AREA URNS HPF: NORMAL /HPF
BASOPHILS # BLD AUTO: 0.04 K/UL (ref 0–0.2)
BASOPHILS NFR BLD: 0.5 % (ref 0–1.9)
BILIRUB SERPL-MCNC: 0.5 MG/DL (ref 0.1–1)
BILIRUB UR QL STRIP: ABNORMAL
BUN SERPL-MCNC: 8 MG/DL (ref 8–23)
CALCIUM SERPL-MCNC: 10.2 MG/DL (ref 8.7–10.5)
CHLORIDE SERPL-SCNC: 101 MMOL/L (ref 95–110)
CLARITY UR: CLEAR
CO2 SERPL-SCNC: 24 MMOL/L (ref 23–29)
COLOR UR: YELLOW
CREAT SERPL-MCNC: 1.7 MG/DL (ref 0.5–1.4)
DIFFERENTIAL METHOD: ABNORMAL
EOSINOPHIL # BLD AUTO: 0.2 K/UL (ref 0–0.5)
EOSINOPHIL NFR BLD: 1.9 % (ref 0–8)
ERYTHROCYTE [DISTWIDTH] IN BLOOD BY AUTOMATED COUNT: 16.3 % (ref 11.5–14.5)
EST. GFR  (AFRICAN AMERICAN): 34 ML/MIN/1.73 M^2
EST. GFR  (NON AFRICAN AMERICAN): 29 ML/MIN/1.73 M^2
GLUCOSE SERPL-MCNC: 130 MG/DL (ref 70–110)
GLUCOSE UR QL STRIP: NEGATIVE
HCT VFR BLD AUTO: 34.4 % (ref 37–48.5)
HGB BLD-MCNC: 10.1 G/DL (ref 12–16)
HGB UR QL STRIP: NEGATIVE
HYALINE CASTS #/AREA URNS LPF: 0 /LPF
IMM GRANULOCYTES # BLD AUTO: 0.05 K/UL (ref 0–0.04)
IMM GRANULOCYTES NFR BLD AUTO: 0.6 % (ref 0–0.5)
KETONES UR QL STRIP: NEGATIVE
LACTATE SERPL-SCNC: 0.9 MMOL/L (ref 0.5–2.2)
LEUKOCYTE ESTERASE UR QL STRIP: NEGATIVE
LIPASE SERPL-CCNC: 7 U/L (ref 4–60)
LYMPHOCYTES # BLD AUTO: 1.1 K/UL (ref 1–4.8)
LYMPHOCYTES NFR BLD: 13.7 % (ref 18–48)
MCH RBC QN AUTO: 23.8 PG (ref 27–31)
MCHC RBC AUTO-ENTMCNC: 29.4 G/DL (ref 32–36)
MCV RBC AUTO: 81 FL (ref 82–98)
MICROSCOPIC COMMENT: NORMAL
MONOCYTES # BLD AUTO: 0.6 K/UL (ref 0.3–1)
MONOCYTES NFR BLD: 8 % (ref 4–15)
NEUTROPHILS # BLD AUTO: 6 K/UL (ref 1.8–7.7)
NEUTROPHILS NFR BLD: 75.3 % (ref 38–73)
NITRITE UR QL STRIP: POSITIVE
NRBC BLD-RTO: 0 /100 WBC
PH UR STRIP: 7 [PH] (ref 5–8)
PLATELET # BLD AUTO: 305 K/UL (ref 150–350)
PMV BLD AUTO: 9.9 FL (ref 9.2–12.9)
POTASSIUM SERPL-SCNC: 3.8 MMOL/L (ref 3.5–5.1)
PROCALCITONIN SERPL IA-MCNC: 0.04 NG/ML
PROT SERPL-MCNC: 7.8 G/DL (ref 6–8.4)
PROT UR QL STRIP: ABNORMAL
RBC # BLD AUTO: 4.24 M/UL (ref 4–5.4)
RBC #/AREA URNS HPF: 4 /HPF (ref 0–4)
SARS-COV-2 RDRP RESP QL NAA+PROBE: NEGATIVE
SODIUM SERPL-SCNC: 138 MMOL/L (ref 136–145)
SP GR UR STRIP: >=1.03 (ref 1–1.03)
URN SPEC COLLECT METH UR: ABNORMAL
UROBILINOGEN UR STRIP-ACNC: NEGATIVE EU/DL
WBC # BLD AUTO: 7.96 K/UL (ref 3.9–12.7)
WBC #/AREA URNS HPF: 3 /HPF (ref 0–5)

## 2020-09-16 PROCEDURE — 63600175 PHARM REV CODE 636 W HCPCS: Performed by: INTERNAL MEDICINE

## 2020-09-16 PROCEDURE — 36415 COLL VENOUS BLD VENIPUNCTURE: CPT

## 2020-09-16 PROCEDURE — 25000003 PHARM REV CODE 250: Performed by: PHYSICIAN ASSISTANT

## 2020-09-16 PROCEDURE — 63600175 PHARM REV CODE 636 W HCPCS: Performed by: EMERGENCY MEDICINE

## 2020-09-16 PROCEDURE — 25000003 PHARM REV CODE 250: Performed by: INTERNAL MEDICINE

## 2020-09-16 PROCEDURE — 25000003 PHARM REV CODE 250: Performed by: EMERGENCY MEDICINE

## 2020-09-16 PROCEDURE — 1125F PR PAIN SEVERITY QUANTIFIED, PAIN PRESENT: ICD-10-PCS | Mod: S$GLB,,, | Performed by: OBSTETRICS & GYNECOLOGY

## 2020-09-16 PROCEDURE — 93010 ELECTROCARDIOGRAM REPORT: CPT | Mod: ,,, | Performed by: INTERNAL MEDICINE

## 2020-09-16 PROCEDURE — G0378 HOSPITAL OBSERVATION PER HR: HCPCS

## 2020-09-16 PROCEDURE — 93010 EKG 12-LEAD: ICD-10-PCS | Mod: ,,, | Performed by: INTERNAL MEDICINE

## 2020-09-16 PROCEDURE — S0028 INJECTION, FAMOTIDINE, 20 MG: HCPCS | Performed by: EMERGENCY MEDICINE

## 2020-09-16 PROCEDURE — 96372 THER/PROPH/DIAG INJ SC/IM: CPT | Mod: 59

## 2020-09-16 PROCEDURE — 3008F BODY MASS INDEX DOCD: CPT | Mod: CPTII,S$GLB,, | Performed by: OBSTETRICS & GYNECOLOGY

## 2020-09-16 PROCEDURE — 87624 HPV HI-RISK TYP POOLED RSLT: CPT

## 2020-09-16 PROCEDURE — 88175 CYTOPATH C/V AUTO FLUID REDO: CPT

## 2020-09-16 PROCEDURE — 99291 CRITICAL CARE FIRST HOUR: CPT | Mod: 25

## 2020-09-16 PROCEDURE — 81000 URINALYSIS NONAUTO W/SCOPE: CPT

## 2020-09-16 PROCEDURE — 96375 TX/PRO/DX INJ NEW DRUG ADDON: CPT

## 2020-09-16 PROCEDURE — 99204 PR OFFICE/OUTPT VISIT, NEW, LEVL IV, 45-59 MIN: ICD-10-PCS | Mod: S$GLB,,, | Performed by: OBSTETRICS & GYNECOLOGY

## 2020-09-16 PROCEDURE — 99999 PR PBB SHADOW E&M-EST. PATIENT-LVL III: ICD-10-PCS | Mod: PBBFAC,,, | Performed by: OBSTETRICS & GYNECOLOGY

## 2020-09-16 PROCEDURE — 96374 THER/PROPH/DIAG INJ IV PUSH: CPT

## 2020-09-16 PROCEDURE — U0002 COVID-19 LAB TEST NON-CDC: HCPCS

## 2020-09-16 PROCEDURE — 99204 OFFICE O/P NEW MOD 45 MIN: CPT | Mod: S$GLB,,, | Performed by: OBSTETRICS & GYNECOLOGY

## 2020-09-16 PROCEDURE — 84145 PROCALCITONIN (PCT): CPT

## 2020-09-16 PROCEDURE — 1125F AMNT PAIN NOTED PAIN PRSNT: CPT | Mod: S$GLB,,, | Performed by: OBSTETRICS & GYNECOLOGY

## 2020-09-16 PROCEDURE — 99999 PR PBB SHADOW E&M-EST. PATIENT-LVL III: CPT | Mod: PBBFAC,,, | Performed by: OBSTETRICS & GYNECOLOGY

## 2020-09-16 PROCEDURE — 3008F PR BODY MASS INDEX (BMI) DOCUMENTED: ICD-10-PCS | Mod: CPTII,S$GLB,, | Performed by: OBSTETRICS & GYNECOLOGY

## 2020-09-16 PROCEDURE — 80053 COMPREHEN METABOLIC PANEL: CPT

## 2020-09-16 PROCEDURE — 1159F MED LIST DOCD IN RCRD: CPT | Mod: S$GLB,,, | Performed by: OBSTETRICS & GYNECOLOGY

## 2020-09-16 PROCEDURE — 86304 IMMUNOASSAY TUMOR CA 125: CPT

## 2020-09-16 PROCEDURE — 1101F PT FALLS ASSESS-DOCD LE1/YR: CPT | Mod: CPTII,S$GLB,, | Performed by: OBSTETRICS & GYNECOLOGY

## 2020-09-16 PROCEDURE — 85025 COMPLETE CBC W/AUTO DIFF WBC: CPT

## 2020-09-16 PROCEDURE — 83605 ASSAY OF LACTIC ACID: CPT

## 2020-09-16 PROCEDURE — 1159F PR MEDICATION LIST DOCUMENTED IN MEDICAL RECORD: ICD-10-PCS | Mod: S$GLB,,, | Performed by: OBSTETRICS & GYNECOLOGY

## 2020-09-16 PROCEDURE — 1101F PR PT FALLS ASSESS DOC 0-1 FALLS W/OUT INJ PAST YR: ICD-10-PCS | Mod: CPTII,S$GLB,, | Performed by: OBSTETRICS & GYNECOLOGY

## 2020-09-16 PROCEDURE — 83690 ASSAY OF LIPASE: CPT

## 2020-09-16 PROCEDURE — 96361 HYDRATE IV INFUSION ADD-ON: CPT

## 2020-09-16 PROCEDURE — 93005 ELECTROCARDIOGRAM TRACING: CPT

## 2020-09-16 RX ORDER — ONDANSETRON 4 MG/1
TABLET, FILM COATED ORAL
COMMUNITY
Start: 2020-08-27

## 2020-09-16 RX ORDER — SODIUM CHLORIDE 9 MG/ML
1000 INJECTION, SOLUTION INTRAVENOUS
Status: COMPLETED | OUTPATIENT
Start: 2020-09-16 | End: 2020-09-16

## 2020-09-16 RX ORDER — TIZANIDINE 2 MG/1
2 TABLET ORAL EVERY 8 HOURS PRN
COMMUNITY
Start: 2020-08-04 | End: 2020-11-02

## 2020-09-16 RX ORDER — HEPARIN SODIUM 5000 [USP'U]/ML
5000 INJECTION, SOLUTION INTRAVENOUS; SUBCUTANEOUS EVERY 8 HOURS
Status: DISCONTINUED | OUTPATIENT
Start: 2020-09-16 | End: 2020-09-20 | Stop reason: HOSPADM

## 2020-09-16 RX ORDER — ALPRAZOLAM 0.5 MG/1
0.5 TABLET ORAL 2 TIMES DAILY PRN
Status: DISCONTINUED | OUTPATIENT
Start: 2020-09-16 | End: 2020-09-20 | Stop reason: HOSPADM

## 2020-09-16 RX ORDER — LORAZEPAM 2 MG/ML
1 INJECTION INTRAMUSCULAR
Status: COMPLETED | OUTPATIENT
Start: 2020-09-16 | End: 2020-09-16

## 2020-09-16 RX ORDER — DULOXETIN HYDROCHLORIDE 30 MG/1
30 CAPSULE, DELAYED RELEASE ORAL
Status: ON HOLD | COMMUNITY
Start: 2020-08-04 | End: 2020-09-20 | Stop reason: SDUPTHER

## 2020-09-16 RX ORDER — RANOLAZINE 500 MG/1
500 TABLET, EXTENDED RELEASE ORAL 2 TIMES DAILY
Status: DISCONTINUED | OUTPATIENT
Start: 2020-09-16 | End: 2020-09-20 | Stop reason: HOSPADM

## 2020-09-16 RX ORDER — TRAMADOL HYDROCHLORIDE 50 MG/1
TABLET ORAL
COMMUNITY
Start: 2020-08-27

## 2020-09-16 RX ORDER — ERGOCALCIFEROL 1.25 MG/1
50000 CAPSULE ORAL
COMMUNITY
Start: 2020-08-04

## 2020-09-16 RX ORDER — TALC
6 POWDER (GRAM) TOPICAL NIGHTLY PRN
Status: DISCONTINUED | OUTPATIENT
Start: 2020-09-16 | End: 2020-09-20 | Stop reason: HOSPADM

## 2020-09-16 RX ORDER — PREDNISONE 20 MG/1
TABLET ORAL
Status: ON HOLD | COMMUNITY
Start: 2020-08-17 | End: 2020-09-20 | Stop reason: HOSPADM

## 2020-09-16 RX ORDER — ASPIRIN 81 MG/1
81 TABLET ORAL DAILY
Status: DISCONTINUED | OUTPATIENT
Start: 2020-09-17 | End: 2020-09-20 | Stop reason: HOSPADM

## 2020-09-16 RX ORDER — SODIUM CHLORIDE 9 MG/ML
INJECTION, SOLUTION INTRAVENOUS CONTINUOUS
Status: DISCONTINUED | OUTPATIENT
Start: 2020-09-16 | End: 2020-09-17

## 2020-09-16 RX ORDER — SODIUM CHLORIDE 0.9 % (FLUSH) 0.9 %
10 SYRINGE (ML) INJECTION
Status: DISCONTINUED | OUTPATIENT
Start: 2020-09-16 | End: 2020-09-20 | Stop reason: HOSPADM

## 2020-09-16 RX ORDER — BENZONATATE 200 MG/1
CAPSULE ORAL
COMMUNITY
Start: 2020-08-17

## 2020-09-16 RX ORDER — LEVOTHYROXINE SODIUM 25 UG/1
25 TABLET ORAL
Status: DISCONTINUED | OUTPATIENT
Start: 2020-09-17 | End: 2020-09-20 | Stop reason: HOSPADM

## 2020-09-16 RX ORDER — METOCLOPRAMIDE HYDROCHLORIDE 5 MG/5ML
5 SOLUTION ORAL EVERY 6 HOURS PRN
Status: DISCONTINUED | OUTPATIENT
Start: 2020-09-16 | End: 2020-09-20 | Stop reason: HOSPADM

## 2020-09-16 RX ORDER — FAMOTIDINE 10 MG/ML
20 INJECTION INTRAVENOUS
Status: COMPLETED | OUTPATIENT
Start: 2020-09-16 | End: 2020-09-16

## 2020-09-16 RX ORDER — SIMVASTATIN 20 MG/1
20 TABLET, FILM COATED ORAL NIGHTLY
Status: DISCONTINUED | OUTPATIENT
Start: 2020-09-16 | End: 2020-09-20 | Stop reason: HOSPADM

## 2020-09-16 RX ORDER — METOCLOPRAMIDE HYDROCHLORIDE 5 MG/5ML
10 SOLUTION ORAL EVERY 6 HOURS PRN
Status: DISCONTINUED | OUTPATIENT
Start: 2020-09-16 | End: 2020-09-16

## 2020-09-16 RX ORDER — PREGABALIN 100 MG/1
100 CAPSULE ORAL
COMMUNITY
Start: 2020-09-10 | End: 2021-09-10

## 2020-09-16 RX ORDER — PLANT STANOL ESTER 450 MG
8000 TABLET ORAL
COMMUNITY

## 2020-09-16 RX ORDER — AMOXICILLIN 500 MG
2 CAPSULE ORAL
Status: ON HOLD | COMMUNITY
End: 2020-09-20 | Stop reason: HOSPADM

## 2020-09-16 RX ORDER — DOXYCYCLINE HYCLATE 100 MG
TABLET ORAL
Status: ON HOLD | COMMUNITY
Start: 2020-08-17 | End: 2020-09-20 | Stop reason: HOSPADM

## 2020-09-16 RX ORDER — HYDROCODONE BITARTRATE AND ACETAMINOPHEN 10; 325 MG/1; MG/1
1 TABLET ORAL EVERY 6 HOURS PRN
Status: DISCONTINUED | OUTPATIENT
Start: 2020-09-16 | End: 2020-09-20

## 2020-09-16 RX ORDER — HYDROGEN PEROXIDE 3 %
SOLUTION, NON-ORAL MISCELLANEOUS
Status: ON HOLD | COMMUNITY
Start: 2020-07-16 | End: 2020-09-20 | Stop reason: HOSPADM

## 2020-09-16 RX ORDER — METOPROLOL TARTRATE 50 MG/1
50 TABLET ORAL 2 TIMES DAILY
Status: DISCONTINUED | OUTPATIENT
Start: 2020-09-16 | End: 2020-09-20 | Stop reason: HOSPADM

## 2020-09-16 RX ORDER — ALBUTEROL SULFATE 90 UG/1
2 AEROSOL, METERED RESPIRATORY (INHALATION) EVERY 4 HOURS PRN
COMMUNITY
Start: 2020-07-09

## 2020-09-16 RX ORDER — PANTOPRAZOLE SODIUM 40 MG/10ML
40 INJECTION, POWDER, LYOPHILIZED, FOR SOLUTION INTRAVENOUS DAILY
Status: DISCONTINUED | OUTPATIENT
Start: 2020-09-16 | End: 2020-09-17

## 2020-09-16 RX ORDER — METOCLOPRAMIDE HYDROCHLORIDE 5 MG/ML
10 INJECTION INTRAMUSCULAR; INTRAVENOUS
Status: COMPLETED | OUTPATIENT
Start: 2020-09-16 | End: 2020-09-16

## 2020-09-16 RX ORDER — DICYCLOMINE HYDROCHLORIDE 20 MG/1
TABLET ORAL
COMMUNITY
Start: 2020-08-31

## 2020-09-16 RX ORDER — DEXLANSOPRAZOLE 60 MG/1
60 CAPSULE, DELAYED RELEASE ORAL
COMMUNITY
Start: 2020-08-04

## 2020-09-16 RX ORDER — ACETAMINOPHEN 500 MG
1000 TABLET ORAL
COMMUNITY

## 2020-09-16 RX ORDER — CLONIDINE HYDROCHLORIDE 0.1 MG/1
0.1 TABLET ORAL EVERY 8 HOURS PRN
Status: DISCONTINUED | OUTPATIENT
Start: 2020-09-16 | End: 2020-09-20 | Stop reason: HOSPADM

## 2020-09-16 RX ORDER — AMOXICILLIN AND CLAVULANATE POTASSIUM 500; 125 MG/1; MG/1
TABLET, FILM COATED ORAL
Status: ON HOLD | COMMUNITY
Start: 2020-07-09 | End: 2020-09-20 | Stop reason: HOSPADM

## 2020-09-16 RX ORDER — DULOXETIN HYDROCHLORIDE 30 MG/1
30 CAPSULE, DELAYED RELEASE ORAL DAILY
Status: DISCONTINUED | OUTPATIENT
Start: 2020-09-17 | End: 2020-09-19

## 2020-09-16 RX ORDER — HYDRALAZINE HYDROCHLORIDE 10 MG/1
10 TABLET, FILM COATED ORAL EVERY 8 HOURS PRN
Status: DISCONTINUED | OUTPATIENT
Start: 2020-09-16 | End: 2020-09-20 | Stop reason: HOSPADM

## 2020-09-16 RX ADMIN — METOCLOPRAMIDE 10 MG: 5 INJECTION, SOLUTION INTRAMUSCULAR; INTRAVENOUS at 02:09

## 2020-09-16 RX ADMIN — SIMVASTATIN 20 MG: 20 TABLET, FILM COATED ORAL at 08:09

## 2020-09-16 RX ADMIN — SODIUM CHLORIDE 1000 ML: 0.9 INJECTION, SOLUTION INTRAVENOUS at 05:09

## 2020-09-16 RX ADMIN — FAMOTIDINE 20 MG: 10 INJECTION, SOLUTION INTRAVENOUS at 02:09

## 2020-09-16 RX ADMIN — HEPARIN SODIUM 5000 UNITS: 5000 INJECTION INTRAVENOUS; SUBCUTANEOUS at 09:09

## 2020-09-16 RX ADMIN — LORAZEPAM 1 MG: 2 INJECTION INTRAMUSCULAR; INTRAVENOUS at 05:09

## 2020-09-16 RX ADMIN — SODIUM CHLORIDE 1000 ML: 0.9 INJECTION, SOLUTION INTRAVENOUS at 02:09

## 2020-09-16 RX ADMIN — CLONIDINE HYDROCHLORIDE 0.1 MG: 0.1 TABLET ORAL at 08:09

## 2020-09-16 RX ADMIN — METOPROLOL TARTRATE 50 MG: 50 TABLET, FILM COATED ORAL at 09:09

## 2020-09-16 RX ADMIN — RANOLAZINE 500 MG: 500 TABLET, FILM COATED, EXTENDED RELEASE ORAL at 08:09

## 2020-09-16 RX ADMIN — SODIUM CHLORIDE: 0.9 INJECTION, SOLUTION INTRAVENOUS at 06:09

## 2020-09-16 RX ADMIN — CEFTRIAXONE 1 G: 1 INJECTION, SOLUTION INTRAVENOUS at 08:09

## 2020-09-16 NOTE — ED NOTES
Attempted to call reports; Tatiana on tele states no nurse for this patient until after 7pm. CN notified    Patient sitting up in bed, no acute distress noted, awake, alert, and oriented x 3, calm, respirations even and unlabored, and skin is warm and dry. Patient verbalized understanding of status and plan of care. Patient denies needs at this time. Side rails up x 2, call light in reach, bed low and locked. Family at bedside. Will continue to monitor.

## 2020-09-16 NOTE — ED NOTES
Returned from US. Patient sitting up in bed, no acute distress noted, awake, alert, and oriented x 3, calm, respirations even and unlabored, and skin is warm and dry. Patient verbalized understanding of status and plan of care. Patient denies needs at this time. Side rails up x 2, call light in reach, bed low and locked. Family at bedside. Will continue to monitor.

## 2020-09-16 NOTE — ED PROVIDER NOTES
"SCRIBE #1 NOTE: I, Livia Couch am scribing for, and in the presence of, Chelsey Garza MD. I have scribed the HPI, ROS, and PEx.     SCRIBE #2 NOTE: I, Malika Melton, am scribing for, and in the presence of,  Judy Enciso Do, MD. I have scribed the remaining portions of the note not scribed by Scribe #1.      History     Chief Complaint   Patient presents with    Abdominal Pain     [id number]; radiates to back. seen here recently for same complaint     Review of patient's allergies indicates:   Allergen Reactions    Sulfa (sulfonamide antibiotics) Anaphylaxis     Throat closing    Ondansetron hcl (pf) Itching    Contrast media      IV contrast    Iodinated contrast media     Promethazine Other (See Comments)     Patient states that it causes a "restless feeling in legs and arms."         History of Present Illness     HPI    9/16/2020, 1:50 PM  History obtained from the patient      History of Present Illness: Su Callejas is a 75 y.o. female patient with a PMHx of bladder cancer, CAD, kidney cancer, CHF, DM, HLD, HTN, SBO, and lupus who presents to the Emergency Department for evaluation of epigastric abdominal pain which onset gradually 3 weeks ago. Symptoms are constant and moderate in severity. No mitigating or exacerbating factors reported. Associated sxs include n/v/d. Patient denies any fever, chils, constipation, dizziness, lightheadedness, fatigue, and all other sxs at this time. She was seen in ED on 9/14 for same symptoms and was sent home with Abx for active colitis. States she has been unable to keep Abx down and symptoms have not improved. Patient was seen by Dr. Capps (ob/gyn) today and sent to ED for pelvic US due to decreased bowel sounds in clinic. No further complaints or concerns at this time.       Arrival mode: Personal vehicle    PCP: Rashawn Ortega DO        Past Medical History:  Past Medical History:   Diagnosis Date    Bladder cancer     CAD (coronary artery " disease)     Cervical disc disease     CHF (congestive heart failure)     Diabetes mellitus     Fibromyalgia     History of kidney cancer     Hyperlipidemia     Hypertension     Lumbar disc disease     Lupus     Malignant neoplasm of urinary bladder     Small bowel obstruction     Uncomplicated opioid dependence        Past Surgical History:  Past Surgical History:   Procedure Laterality Date    CARDIAC CATHETERIZATION      COLON SURGERY      Bowel resection early 2018.    LAPAROSCOPIC ROBOT-ASSISTED SURGICAL REMOVAL OF KIDNEY USING DA IRMA XI Left          Family History:  History reviewed. No pertinent family history.    Social History:  Social History     Tobacco Use    Smoking status: Former Smoker     Years: 25.00     Types: Cigarettes    Smokeless tobacco: Never Used   Substance and Sexual Activity    Alcohol use: No    Drug use: No    Sexual activity: Not Currently        Review of Systems     Review of Systems   Constitutional: Negative for activity change, appetite change, chills, diaphoresis, fatigue and fever.   HENT: Negative for congestion, ear pain, nosebleeds, rhinorrhea, sinus pain, sore throat and trouble swallowing.    Eyes: Negative for pain and discharge.   Respiratory: Negative for cough, chest tightness, shortness of breath, wheezing and stridor.    Cardiovascular: Negative for chest pain, palpitations and leg swelling.   Gastrointestinal: Positive for abdominal pain, diarrhea, nausea and vomiting. Negative for abdominal distention, blood in stool and constipation.   Genitourinary: Negative for difficulty urinating, dysuria, flank pain, frequency, hematuria and urgency.   Musculoskeletal: Negative for arthralgias, back pain, myalgias and neck pain.   Skin: Negative for pallor, rash and wound.   Neurological: Negative for dizziness, syncope, weakness, light-headedness, numbness and headaches.   Hematological: Does not bruise/bleed easily.   Psychiatric/Behavioral: Negative  for confusion and self-injury.   All other systems reviewed and are negative.     Physical Exam     Initial Vitals [09/16/20 1313]   BP Pulse Resp Temp SpO2   136/85 84 20 97.9 °F (36.6 °C) 95 %      MAP       --          Physical Exam  Nursing Notes and Vital Signs Reviewed.  Constitutional: Patient is in no acute distress. Well-developed and well-nourished.  Head: Atraumatic. Normocephalic.  Eyes: PERRL. EOM intact. Conjunctivae are not pale. No scleral icterus.  ENT: Mucous membranes are moist. Oropharynx is clear and symmetric.    Neck: Supple. Full ROM. No lymphadenopathy.  Cardiovascular: Regular rate. Regular rhythm. No murmurs, rubs, or gallops. Distal pulses are 2+ and symmetric.  Pulmonary/Chest: No respiratory distress. Clear to auscultation bilaterally. No wheezing or rales.  Abdominal: Soft and non-distended.  There is no tenderness.  No rebound, guarding, or rigidity. Good bowel sounds.  Genitourinary: No CVA tenderness  Musculoskeletal: Moves all extremities. No obvious deformities. No edema. No calf tenderness.  Skin: Warm and dry.  Neurological:  Alert, awake, and appropriate.  Normal speech.  No acute focal neurological deficits are appreciated.  Psychiatric: Normal affect. Good eye contact. Appropriate in content.     ED Course   Critical Care    Date/Time: 9/16/2020 5:58 PM  Performed by: Judy Enciso Do, MD  Authorized by: Judy Enciso Do, MD   Direct patient critical care time: 6 minutes  Additional history critical care time: 6 minutes  Ordering / reviewing critical care time: 5 minutes  Documentation critical care time: 5 minutes  Consulting other physicians critical care time: 5 minutes  Consult with family critical care time: 5 minutes  Total critical care time (exclusive of procedural time) : 32 minutes  Critical care was necessary to treat or prevent imminent or life-threatening deterioration of the following conditions: dehydration and renal failure.  Critical care was time spent  personally by me on the following activities: development of treatment plan with patient or surrogate, discussions with consultants, interpretation of cardiac output measurements, evaluation of patient's response to treatment, examination of patient, obtaining history from patient or surrogate, ordering and performing treatments and interventions, ordering and review of laboratory studies, ordering and review of radiographic studies, pulse oximetry, re-evaluation of patient's condition and review of old charts.        ED Vital Signs:  Vitals:    09/16/20 1313 09/16/20 1323 09/16/20 1325 09/16/20 1533   BP: 136/85  (!) 143/92 (!) 141/85   Pulse: 84 72 68 63   Resp: 20  19 18   Temp: 97.9 °F (36.6 °C)      TempSrc: Oral      SpO2: 95%  96% 98%    09/16/20 1745   BP: (!) 156/86   Pulse: 67   Resp: 18   Temp:    TempSrc:    SpO2: 98%       Abnormal Lab Results:  Labs Reviewed   CBC W/ AUTO DIFFERENTIAL - Abnormal; Notable for the following components:       Result Value    Hemoglobin 10.1 (*)     Hematocrit 34.4 (*)     Mean Corpuscular Volume 81 (*)     Mean Corpuscular Hemoglobin 23.8 (*)     Mean Corpuscular Hemoglobin Conc 29.4 (*)     RDW 16.3 (*)     Immature Granulocytes 0.6 (*)     Immature Grans (Abs) 0.05 (*)     Gran% 75.3 (*)     Lymph% 13.7 (*)     All other components within normal limits   COMPREHENSIVE METABOLIC PANEL - Abnormal; Notable for the following components:    Glucose 130 (*)     Creatinine 1.7 (*)     eGFR if  34 (*)     eGFR if non  29 (*)     All other components within normal limits   URINALYSIS, REFLEX TO URINE CULTURE - Abnormal; Notable for the following components:    Specific Gravity, UA >=1.030 (*)     Protein, UA 1+ (*)     Bilirubin (UA) 1+ (*)     Nitrite, UA Positive (*)     All other components within normal limits    Narrative:     Specimen Source->Urine   LIPASE   SARS-COV-2 RNA AMPLIFICATION, QUAL    Narrative:     Possible admit   CANCER  ANTIGEN 125   URINALYSIS MICROSCOPIC    Narrative:     Specimen Source->Urine   CANCER ANTIGEN 125        All Lab Results:  Results for orders placed or performed during the hospital encounter of 09/16/20   CBC W/ AUTO DIFFERENTIAL   Result Value Ref Range    WBC 7.96 3.90 - 12.70 K/uL    RBC 4.24 4.00 - 5.40 M/uL    Hemoglobin 10.1 (L) 12.0 - 16.0 g/dL    Hematocrit 34.4 (L) 37.0 - 48.5 %    Mean Corpuscular Volume 81 (L) 82 - 98 fL    Mean Corpuscular Hemoglobin 23.8 (L) 27.0 - 31.0 pg    Mean Corpuscular Hemoglobin Conc 29.4 (L) 32.0 - 36.0 g/dL    RDW 16.3 (H) 11.5 - 14.5 %    Platelets 305 150 - 350 K/uL    MPV 9.9 9.2 - 12.9 fL    Immature Granulocytes 0.6 (H) 0.0 - 0.5 %    Gran # (ANC) 6.0 1.8 - 7.7 K/uL    Immature Grans (Abs) 0.05 (H) 0.00 - 0.04 K/uL    Lymph # 1.1 1.0 - 4.8 K/uL    Mono # 0.6 0.3 - 1.0 K/uL    Eos # 0.2 0.0 - 0.5 K/uL    Baso # 0.04 0.00 - 0.20 K/uL    nRBC 0 0 /100 WBC    Gran% 75.3 (H) 38.0 - 73.0 %    Lymph% 13.7 (L) 18.0 - 48.0 %    Mono% 8.0 4.0 - 15.0 %    Eosinophil% 1.9 0.0 - 8.0 %    Basophil% 0.5 0.0 - 1.9 %    Differential Method Automated    Comp. Metabolic Panel   Result Value Ref Range    Sodium 138 136 - 145 mmol/L    Potassium 3.8 3.5 - 5.1 mmol/L    Chloride 101 95 - 110 mmol/L    CO2 24 23 - 29 mmol/L    Glucose 130 (H) 70 - 110 mg/dL    BUN, Bld 8 8 - 23 mg/dL    Creatinine 1.7 (H) 0.5 - 1.4 mg/dL    Calcium 10.2 8.7 - 10.5 mg/dL    Total Protein 7.8 6.0 - 8.4 g/dL    Albumin 4.0 3.5 - 5.2 g/dL    Total Bilirubin 0.5 0.1 - 1.0 mg/dL    Alkaline Phosphatase 72 55 - 135 U/L    AST 15 10 - 40 U/L    ALT 10 10 - 44 U/L    Anion Gap 13 8 - 16 mmol/L    eGFR if African American 34 (A) >60 mL/min/1.73 m^2    eGFR if non African American 29 (A) >60 mL/min/1.73 m^2   Lipase   Result Value Ref Range    Lipase 7 4 - 60 U/L   Urinalysis, Reflex to Urine Culture Urine, Catheterized    Specimen: Urine   Result Value Ref Range    Specimen UA Urine, Catheterized     Color, UA Yellow  Yellow, Straw, Rena    Appearance, UA Clear Clear    pH, UA 7.0 5.0 - 8.0    Specific Gravity, UA >=1.030 (A) 1.005 - 1.030    Protein, UA 1+ (A) Negative    Glucose, UA Negative Negative    Ketones, UA Negative Negative    Bilirubin (UA) 1+ (A) Negative    Occult Blood UA Negative Negative    Nitrite, UA Positive (A) Negative    Urobilinogen, UA Negative <2.0 EU/dL    Leukocytes, UA Negative Negative   COVID-19 Rapid Screening   Result Value Ref Range    SARS-CoV-2 RNA, Amplification, Qual Negative Negative   Urinalysis Microscopic   Result Value Ref Range    RBC, UA 4 0 - 4 /hpf    WBC, UA 3 0 - 5 /hpf    Bacteria None None-Occ /hpf    Hyaline Casts, UA 0 0-1/lpf /lpf    Microscopic Comment SEE COMMENT          Imaging Results:  Imaging Results          US Pelvis Complete Non OB (Final result)  Result time 09/16/20 16:34:59    Final result by Cuauhtemoc Mera MD (09/16/20 16:34:59)                 Impression:      1. Atrophic uterus.  Endometrium measures 2 mm in width which is within normal limits for a postmenopausal patient.  2. Simple, benign appearing adnexal cyst the right ovary measures 3.3 cm in maximal dimension.  Finding correlates with the abnormality identified on prior CT dated 09/14/2020.      Electronically signed by: Cuauhtemoc Mera  Date:    09/16/2020  Time:    16:34             Narrative:    EXAMINATION:  US PELVIS COMPLETE NON OB    CLINICAL HISTORY:  pelvic pain;.  Pelvic pain.    COMPARISON:  No prior ultrasound available.  Prior CT dated 09/14/2020 has been reviewed.    FINDINGS:  The uterus measures 5.4 x 1.4 x 2.5 cm uterus is normal in appearance. Endometrium measures 2 mm in with.    The right ovary measures 3.2 x 2.8 x 3.3 cm.  An adnexal cyst of the right ovary is identified measuring up to 3.3 cm in maximal dimension.  Normal Doppler flow identified within the right ovary.    Left ovary cannot be visualized.    No free fluid or pelvic mass is detected.                                X-Ray Abdomen Flat And Erect (Final result)  Result time 09/16/20 14:09:44    Final result by Cuauhtemoc Mera MD (09/16/20 14:09:44)                 Impression:      Nonobstructive bowel gas pattern.  No change since 06/29/2019.      Electronically signed by: Cuauhtemoc Mera  Date:    09/16/2020  Time:    14:09             Narrative:    EXAMINATION:  XR ABDOMEN FLAT AND ERECT    CLINICAL HISTORY:  Abdominal Pain;    COMPARISON:  06/29/2019    FINDINGS:  Supine and upright views of the abdomen demonstrate no abnormal bowel dilatation.  No significant air-fluid levels.  Normal amount of stool.Surgical clips right abdomen and right upper pelvis.  No change.  Regional bones are unremarkable.                                 The EKG was ordered, reviewed, and independently interpreted by the ED provider.  Interpretation time: 14:04  Rate: 65 BPM  Rhythm: normal sinus rhythm  Interpretation: ST&T wave abnormality, consider lateral ischemia. No STEMI.           The Emergency Provider reviewed the vital signs and test results, which are outlined above.     ED Discussion     4:00 PM: Dr. Garza transfers care of patient to Dr. Palm pending US results.    5:12 PM: Dr. Palm evaluated pt. Pt continues to dry heave.  Pt states she has been able to take only 1 pill that she was prescribed for colitis on Monday.      5:22 PM: Discussed pt's case with MERCY Malcolm ([facility]) who recommends admit pt for RYDER with solitary kidney.    5:22 PM: Discussed case with MERCY Malcolm ([facility]). Dr. Marte agrees with current care and management of pt and accepts admission.   Admitting Service: Hospital Medicine  Admitting Physician: Dr. Marte  Admit to: Obs, med surg    5:23 PM: Re-evaluated pt. I have discussed test results, shared treatment plan, and the need for admission with patient and family at bedside. Pt and family express understanding at this time and agree with all information. All  questions answered. Pt and family have no further questions or concerns at this time. Pt is ready for admit.             Medical Decision Making:   Clinical Tests:   Lab Tests: Ordered and Reviewed  Radiological Study: Ordered and Reviewed  Medical Tests: Ordered and Reviewed           ED Medication(s):  Medications   0.9%  NaCl infusion (has no administration in time range)   metoclopramide HCl injection 10 mg (10 mg Intravenous Given 9/16/20 1404)   sodium chloride 0.9% bolus 1,000 mL (0 mLs Intravenous Stopped 9/16/20 1515)   famotidine (PF) injection 20 mg (20 mg Intravenous Given 9/16/20 1404)   0.9%  NaCl infusion (1,000 mLs Intravenous New Bag 9/16/20 1729)   lorazepam injection 1 mg (1 mg Intravenous Given 9/16/20 1743)       New Prescriptions    No medications on file               Scribe Attestation:   Scribe #1: I performed the above scribed service and the documentation accurately describes the services I performed. I attest to the accuracy of the note.     Attending:   Physician Attestation Statement for Scribe #1: I, Chelsey Garza MD, personally performed the services described in this documentation, as scribed by Livia Couch, in my presence, and it is both accurate and complete.       Scribe Attestation:   Scribe #2: I performed the above scribed service and the documentation accurately describes the services I performed. I attest to the accuracy of the note.    Attending Attestation:           Physician Attestation for Scribe:    Physician Attestation Statement for Scribe #2: I, Judy Enciso Do, MD, reviewed documentation, as scribed by Malika Melton in my presence, and it is both accurate and complete. I also acknowledge and confirm the content of the note done by Scribe #1.           Clinical Impression       ICD-10-CM ICD-9-CM   1. Intractable vomiting with nausea, unspecified vomiting type  R11.2 536.2   2. Chronic abdominal pain  R10.9 789.00    G89.29 338.29   3. RYDER (acute kidney  injury)  N17.9 584.9       Disposition:   Disposition: Placed in Observation  Condition: Oumou Enciso Do, MD  09/16/20 5181

## 2020-09-16 NOTE — PLAN OF CARE
09/16/20 1413   ED Admissions Case Approval   ED Admissions Case Approval CM Approved     Initial review for possible hospitalization complete.  Patient meets criteria for Observation. TB/RN

## 2020-09-17 PROBLEM — K82.8 BILIARY DYSKINESIA: Status: ACTIVE | Noted: 2020-09-17

## 2020-09-17 LAB
ALBUMIN SERPL BCP-MCNC: 3.6 G/DL (ref 3.5–5.2)
ALP SERPL-CCNC: 63 U/L (ref 55–135)
ALT SERPL W/O P-5'-P-CCNC: 12 U/L (ref 10–44)
ANION GAP SERPL CALC-SCNC: 11 MMOL/L (ref 8–16)
AST SERPL-CCNC: 17 U/L (ref 10–40)
BASOPHILS # BLD AUTO: 0.04 K/UL (ref 0–0.2)
BASOPHILS NFR BLD: 0.7 % (ref 0–1.9)
BILIRUB SERPL-MCNC: 0.4 MG/DL (ref 0.1–1)
BILIRUB UR QL STRIP: NEGATIVE
BUN SERPL-MCNC: 7 MG/DL (ref 8–23)
CALCIUM SERPL-MCNC: 9.2 MG/DL (ref 8.7–10.5)
CANCER AG125 SERPL-ACNC: 15 U/ML (ref 0–30)
CHLORIDE SERPL-SCNC: 103 MMOL/L (ref 95–110)
CLARITY UR: CLEAR
CO2 SERPL-SCNC: 23 MMOL/L (ref 23–29)
COLOR UR: YELLOW
CREAT SERPL-MCNC: 1.6 MG/DL (ref 0.5–1.4)
DIFFERENTIAL METHOD: ABNORMAL
EOSINOPHIL # BLD AUTO: 0.2 K/UL (ref 0–0.5)
EOSINOPHIL NFR BLD: 3.5 % (ref 0–8)
ERYTHROCYTE [DISTWIDTH] IN BLOOD BY AUTOMATED COUNT: 16.3 % (ref 11.5–14.5)
EST. GFR  (AFRICAN AMERICAN): 36 ML/MIN/1.73 M^2
EST. GFR  (NON AFRICAN AMERICAN): 32 ML/MIN/1.73 M^2
GLUCOSE SERPL-MCNC: 112 MG/DL (ref 70–110)
GLUCOSE UR QL STRIP: NEGATIVE
HCT VFR BLD AUTO: 30.6 % (ref 37–48.5)
HGB BLD-MCNC: 9.1 G/DL (ref 12–16)
HGB UR QL STRIP: NEGATIVE
IMM GRANULOCYTES # BLD AUTO: 0.01 K/UL (ref 0–0.04)
IMM GRANULOCYTES NFR BLD AUTO: 0.2 % (ref 0–0.5)
KETONES UR QL STRIP: NEGATIVE
LEUKOCYTE ESTERASE UR QL STRIP: NEGATIVE
LYMPHOCYTES # BLD AUTO: 1.1 K/UL (ref 1–4.8)
LYMPHOCYTES NFR BLD: 18.6 % (ref 18–48)
MAGNESIUM SERPL-MCNC: 1.4 MG/DL (ref 1.6–2.6)
MCH RBC QN AUTO: 23.9 PG (ref 27–31)
MCHC RBC AUTO-ENTMCNC: 29.7 G/DL (ref 32–36)
MCV RBC AUTO: 81 FL (ref 82–98)
MONOCYTES # BLD AUTO: 0.7 K/UL (ref 0.3–1)
MONOCYTES NFR BLD: 10.9 % (ref 4–15)
NEUTROPHILS # BLD AUTO: 4 K/UL (ref 1.8–7.7)
NEUTROPHILS NFR BLD: 66.1 % (ref 38–73)
NITRITE UR QL STRIP: NEGATIVE
NRBC BLD-RTO: 0 /100 WBC
PH UR STRIP: 6 [PH] (ref 5–8)
PHOSPHATE SERPL-MCNC: 2.8 MG/DL (ref 2.7–4.5)
PLATELET # BLD AUTO: 231 K/UL (ref 150–350)
PMV BLD AUTO: 9.7 FL (ref 9.2–12.9)
POTASSIUM SERPL-SCNC: 4.1 MMOL/L (ref 3.5–5.1)
PROT SERPL-MCNC: 6.9 G/DL (ref 6–8.4)
PROT UR QL STRIP: NEGATIVE
RBC # BLD AUTO: 3.8 M/UL (ref 4–5.4)
SODIUM SERPL-SCNC: 137 MMOL/L (ref 136–145)
SP GR UR STRIP: >=1.03 (ref 1–1.03)
URN SPEC COLLECT METH UR: ABNORMAL
UROBILINOGEN UR STRIP-ACNC: NEGATIVE EU/DL
WBC # BLD AUTO: 6.06 K/UL (ref 3.9–12.7)

## 2020-09-17 PROCEDURE — 36415 COLL VENOUS BLD VENIPUNCTURE: CPT

## 2020-09-17 PROCEDURE — 96375 TX/PRO/DX INJ NEW DRUG ADDON: CPT

## 2020-09-17 PROCEDURE — 96372 THER/PROPH/DIAG INJ SC/IM: CPT | Mod: 59

## 2020-09-17 PROCEDURE — 25000003 PHARM REV CODE 250: Performed by: INTERNAL MEDICINE

## 2020-09-17 PROCEDURE — 99214 PR OFFICE/OUTPT VISIT, EST, LEVL IV, 30-39 MIN: ICD-10-PCS | Mod: ,,, | Performed by: SURGERY

## 2020-09-17 PROCEDURE — G0378 HOSPITAL OBSERVATION PER HR: HCPCS

## 2020-09-17 PROCEDURE — 84100 ASSAY OF PHOSPHORUS: CPT

## 2020-09-17 PROCEDURE — 80053 COMPREHEN METABOLIC PANEL: CPT

## 2020-09-17 PROCEDURE — 96376 TX/PRO/DX INJ SAME DRUG ADON: CPT

## 2020-09-17 PROCEDURE — C9113 INJ PANTOPRAZOLE SODIUM, VIA: HCPCS | Performed by: INTERNAL MEDICINE

## 2020-09-17 PROCEDURE — 87086 URINE CULTURE/COLONY COUNT: CPT

## 2020-09-17 PROCEDURE — 96361 HYDRATE IV INFUSION ADD-ON: CPT

## 2020-09-17 PROCEDURE — 83735 ASSAY OF MAGNESIUM: CPT

## 2020-09-17 PROCEDURE — 85025 COMPLETE CBC W/AUTO DIFF WBC: CPT

## 2020-09-17 PROCEDURE — 99214 OFFICE O/P EST MOD 30 MIN: CPT | Mod: ,,, | Performed by: SURGERY

## 2020-09-17 PROCEDURE — 63600175 PHARM REV CODE 636 W HCPCS: Performed by: INTERNAL MEDICINE

## 2020-09-17 PROCEDURE — 81003 URINALYSIS AUTO W/O SCOPE: CPT

## 2020-09-17 RX ORDER — METRONIDAZOLE 500 MG/1
500 TABLET ORAL EVERY 8 HOURS
Status: DISCONTINUED | OUTPATIENT
Start: 2020-09-17 | End: 2020-09-19

## 2020-09-17 RX ORDER — LORAZEPAM 2 MG/ML
INJECTION INTRAMUSCULAR
Status: DISPENSED
Start: 2020-09-17 | End: 2020-09-18

## 2020-09-17 RX ORDER — PANTOPRAZOLE SODIUM 40 MG/1
40 TABLET, DELAYED RELEASE ORAL DAILY
Status: DISCONTINUED | OUTPATIENT
Start: 2020-09-18 | End: 2020-09-19

## 2020-09-17 RX ORDER — AMLODIPINE BESYLATE 5 MG/1
5 TABLET ORAL 2 TIMES DAILY
Status: DISCONTINUED | OUTPATIENT
Start: 2020-09-17 | End: 2020-09-20 | Stop reason: HOSPADM

## 2020-09-17 RX ORDER — MAGNESIUM SULFATE HEPTAHYDRATE 40 MG/ML
2 INJECTION, SOLUTION INTRAVENOUS ONCE
Status: COMPLETED | OUTPATIENT
Start: 2020-09-17 | End: 2020-09-17

## 2020-09-17 RX ADMIN — HEPARIN SODIUM 5000 UNITS: 5000 INJECTION INTRAVENOUS; SUBCUTANEOUS at 01:09

## 2020-09-17 RX ADMIN — HEPARIN SODIUM 5000 UNITS: 5000 INJECTION INTRAVENOUS; SUBCUTANEOUS at 05:09

## 2020-09-17 RX ADMIN — ASPIRIN 81 MG: 81 TABLET, COATED ORAL at 08:09

## 2020-09-17 RX ADMIN — CLONIDINE HYDROCHLORIDE 0.1 MG: 0.1 TABLET ORAL at 03:09

## 2020-09-17 RX ADMIN — HYDROCODONE BITARTRATE AND ACETAMINOPHEN 1 TABLET: 10; 325 TABLET ORAL at 09:09

## 2020-09-17 RX ADMIN — METOPROLOL TARTRATE 50 MG: 50 TABLET, FILM COATED ORAL at 08:09

## 2020-09-17 RX ADMIN — SODIUM CHLORIDE: 0.9 INJECTION, SOLUTION INTRAVENOUS at 04:09

## 2020-09-17 RX ADMIN — AMLODIPINE BESYLATE 5 MG: 5 TABLET ORAL at 09:09

## 2020-09-17 RX ADMIN — PANTOPRAZOLE SODIUM 40 MG: 40 INJECTION, POWDER, LYOPHILIZED, FOR SOLUTION INTRAVENOUS at 09:09

## 2020-09-17 RX ADMIN — DULOXETINE 30 MG: 30 CAPSULE, DELAYED RELEASE ORAL at 09:09

## 2020-09-17 RX ADMIN — HYDROCODONE BITARTRATE AND ACETAMINOPHEN 1 TABLET: 10; 325 TABLET ORAL at 02:09

## 2020-09-17 RX ADMIN — HYDRALAZINE HYDROCHLORIDE 10 MG: 10 TABLET, FILM COATED ORAL at 12:09

## 2020-09-17 RX ADMIN — ALPRAZOLAM 0.5 MG: 0.5 TABLET ORAL at 09:09

## 2020-09-17 RX ADMIN — AMLODIPINE BESYLATE 5 MG: 5 TABLET ORAL at 01:09

## 2020-09-17 RX ADMIN — RANOLAZINE 500 MG: 500 TABLET, FILM COATED, EXTENDED RELEASE ORAL at 08:09

## 2020-09-17 RX ADMIN — LEVOTHYROXINE SODIUM 25 MCG: 25 TABLET ORAL at 05:09

## 2020-09-17 RX ADMIN — METOPROLOL TARTRATE 50 MG: 50 TABLET, FILM COATED ORAL at 09:09

## 2020-09-17 RX ADMIN — SIMVASTATIN 20 MG: 20 TABLET, FILM COATED ORAL at 09:09

## 2020-09-17 RX ADMIN — CEFTRIAXONE 1 G: 1 INJECTION, SOLUTION INTRAVENOUS at 09:09

## 2020-09-17 RX ADMIN — HEPARIN SODIUM 5000 UNITS: 5000 INJECTION INTRAVENOUS; SUBCUTANEOUS at 09:09

## 2020-09-17 RX ADMIN — RANOLAZINE 500 MG: 500 TABLET, FILM COATED, EXTENDED RELEASE ORAL at 09:09

## 2020-09-17 RX ADMIN — MAGNESIUM SULFATE 2 G: 2 INJECTION INTRAVENOUS at 09:09

## 2020-09-17 RX ADMIN — METRONIDAZOLE 500 MG: 500 TABLET ORAL at 09:09

## 2020-09-17 NOTE — SUBJECTIVE & OBJECTIVE
Interval History:    HIDA scan done this morning resulted gallbladder ejection fraction is 23 % at 30 minutes.  Findings are concerning for chronic cholecystitis . Will consult General Surgery for input      Review of Systems   Constitutional: Positive for activity change and appetite change. Negative for fever.   HENT: Negative for sore throat.    Eyes: Negative for visual disturbance.   Respiratory: Negative for cough, chest tightness and shortness of breath.    Cardiovascular: Negative for chest pain, palpitations and leg swelling.   Gastrointestinal: Positive for abdominal pain (better) and nausea (better). Negative for abdominal distention, constipation, diarrhea and vomiting.   Endocrine: Negative for polyuria.   Genitourinary: Negative for decreased urine volume, dysuria, flank pain, frequency and hematuria.   Musculoskeletal: Negative for back pain and gait problem.   Skin: Negative for rash.   Neurological: Negative for syncope, speech difficulty, weakness, light-headedness and headaches.   Psychiatric/Behavioral: Negative for confusion, hallucinations and sleep disturbance.     Objective:     Vital Signs (Most Recent):  Temp: 98.4 °F (36.9 °C) (09/17/20 1544)  Pulse: 71 (09/17/20 1544)  Resp: 18 (09/17/20 1544)  BP: (!) 181/77 (09/17/20 1544)  SpO2: (!) 93 % (09/17/20 1544) Vital Signs (24h Range):  Temp:  [97.9 °F (36.6 °C)-99.2 °F (37.3 °C)] 98.4 °F (36.9 °C)  Pulse:  [57-78] 71  Resp:  [16-19] 18  SpO2:  [93 %-99 %] 93 %  BP: (156-211)/(55-88) 181/77     Weight: 60.5 kg (133 lb 6.1 oz)  Body mass index is 24.4 kg/m².    Intake/Output Summary (Last 24 hours) at 9/17/2020 1642  Last data filed at 9/17/2020 1600  Gross per 24 hour   Intake 2380 ml   Output 300 ml   Net 2080 ml      Physical Exam  Constitutional:       General: She is not in acute distress.     Appearance: She is well-developed. She is not diaphoretic.   HENT:      Head: Normocephalic and atraumatic.      Mouth/Throat:      Pharynx: No  oropharyngeal exudate.   Eyes:      Conjunctiva/sclera: Conjunctivae normal.      Pupils: Pupils are equal, round, and reactive to light.   Neck:      Musculoskeletal: Normal range of motion and neck supple.      Thyroid: No thyromegaly.      Vascular: No JVD.   Cardiovascular:      Rate and Rhythm: Normal rate and regular rhythm.      Heart sounds: Normal heart sounds. No murmur.   Pulmonary:      Effort: Pulmonary effort is normal. No respiratory distress.      Breath sounds: Normal breath sounds. No wheezing or rales.   Chest:      Chest wall: No tenderness.   Abdominal:      General: Bowel sounds are normal. There is no distension.      Palpations: Abdomen is soft.      Tenderness: There is abdominal tenderness (mild generalized ). There is no guarding or rebound.   Musculoskeletal: Normal range of motion.   Lymphadenopathy:      Cervical: No cervical adenopathy.   Skin:     General: Skin is warm and dry.      Findings: No rash.   Neurological:      Mental Status: She is alert and oriented to person, place, and time.      Cranial Nerves: No cranial nerve deficit.      Sensory: No sensory deficit.      Deep Tendon Reflexes: Reflexes normal.         Significant Labs:   CBC:   Recent Labs   Lab 09/16/20  1326 09/17/20  0648   WBC 7.96 6.06   HGB 10.1* 9.1*   HCT 34.4* 30.6*    231     CMP:   Recent Labs   Lab 09/16/20  1326 09/17/20  0648    137   K 3.8 4.1    103   CO2 24 23   * 112*   BUN 8 7*   CREATININE 1.7* 1.6*   CALCIUM 10.2 9.2   PROT 7.8 6.9   ALBUMIN 4.0 3.6   BILITOT 0.5 0.4   ALKPHOS 72 63   AST 15 17   ALT 10 12   ANIONGAP 13 11   EGFRNONAA 29* 32*       Significant Imaging:

## 2020-09-17 NOTE — CONSULTS
[facility] -   General Surgery  Consult Note    Patient Name: Su Callejas  MRN: [id number]  Code Status: Full Code  Admission Date: 9/16/2020  Hospital Length of Stay: 0 days  Attending Physician: Caitlin Marte MD  Primary Care Provider: Rashawn Ortega DO    Patient information was obtained from patient.     Consults  Subjective:     Principal Problem: Gastroenteritis    History of Present Illness: 75-year-old female referred for nausea/abdominal pain and abnormal HIDA scan.  The patient reports over a month of diarrhea with change in color of her bowels she does endorse tardy stools but then states that they have been acholic for the majority of this time.  She has lower abdominal pain that radiates to her back that has been fairly constant.  She also has some nausea which she attributes to her achalasia status been operated on multiple times and subsequent dilations. She has been undergone extensive GI workup at [facility].  She denies any right upper quadrant abdominal pain or postprandial symptoms but has had decreased appetite during this time.  She reports no reproduction of symptoms with CCK injection.    No current facility-administered medications on file prior to encounter.      Current Outpatient Medications on File Prior to Encounter   Medication Sig    albuterol (PROVENTIL/VENTOLIN HFA) 90 mcg/actuation inhaler Inhale 2 puffs into the lungs every 4 (four) hours as needed.    aspirin (ECOTRIN) 81 MG EC tablet Take 81 mg by mouth once daily.    dexlansoprazole (DEXILANT) 60 mg capsule Take 60 mg by mouth.    dicyclomine (BENTYL) 20 mg tablet     DULoxetine (CYMBALTA) 30 MG capsule Take 30 mg by mouth.    ergocalciferol (ERGOCALCIFEROL) 50,000 unit Cap Take 50,000 Units by mouth.    ergocalciferol, vitamin D2, (VITAMIN D ORAL) Take by mouth.    esomeprazole (NEXIUM) 20 MG capsule     levothyroxine (SYNTHROID) 25 MCG tablet Take 25 mcg by mouth once daily.    metoprolol tartrate  (LOPRESSOR) 50 MG tablet Take 50 mg by mouth 2 (two) times daily.    omega-3 fatty acids/fish oil (FISH OIL-OMEGA-3 FATTY ACIDS) 300-1,000 mg capsule Take 2 g by mouth.    pantoprazole (PROTONIX) 40 MG tablet Take 40 mg by mouth once daily.    predniSONE (DELTASONE) 20 MG tablet     pregabalin (LYRICA) 100 MG capsule Take 100 mg by mouth.    ranolazine (RANEXA) 500 MG Tb12 Take 500 mg by mouth 2 (two) times daily.    simvastatin (ZOCOR) 20 MG tablet Take 20 mg by mouth every evening.    tiZANidine (ZANAFLEX) 2 MG tablet Take 2 mg by mouth every 8 (eight) hours as needed.    vitamin A 8000 UNIT capsule Take 8,000 Units by mouth.    acetaminophen (TYLENOL) 500 MG tablet Take 1,000 mg by mouth.    amoxicillin-clavulanate 500-125mg (AUGMENTIN) 500-125 mg Tab     benzonatate (TESSALON) 200 MG capsule     ciprofloxacin HCl (CIPRO) 500 MG tablet Take 1 tablet (500 mg total) by mouth 2 (two) times daily. for 7 days    doxycycline (VIBRA-TABS) 100 MG tablet     HYDROcodone-acetaminophen (NORCO)  mg per tablet Take 1 tablet by mouth every 6 (six) hours as needed for Pain.    MELATONIN ORAL Take by mouth.    metroNIDAZOLE (FLAGYL) 500 MG tablet Take 1 tablet (500 mg total) by mouth 3 (three) times daily. for 7 days    nitroGLYCERIN (NITROSTAT) 0.3 MG SL tablet Place 0.3 mg under the tongue every 5 (five) minutes as needed for Chest pain.    ondansetron (ZOFRAN) 4 MG tablet     oxyCODONE (ROXICODONE) 15 MG Tab Take 10 mg by mouth every 4 (four) hours as needed for Pain.    promethazine (PHENERGAN) 25 MG tablet Take 1 tablet (25 mg total) by mouth every 6 (six) hours as needed for Nausea.    traMADoL (ULTRAM) 50 mg tablet        Review of patient's allergies indicates:   Allergen Reactions    Sulfa (sulfonamide antibiotics) Anaphylaxis     Throat closing    Ondansetron hcl (pf) Itching    Contrast media      IV contrast    Iodinated contrast media     Promethazine Other (See Comments)     Patient  "states that it causes a "restless feeling in legs and arms."       Past Medical History:   Diagnosis Date    Bladder cancer     CAD (coronary artery disease)     Cervical disc disease     CHF (congestive heart failure)     Diabetes mellitus     Fibromyalgia     History of kidney cancer     Hyperlipidemia     Hypertension     Lumbar disc disease     Lupus     Malignant neoplasm of urinary bladder     Small bowel obstruction     Uncomplicated opioid dependence      Past Surgical History:   Procedure Laterality Date    CARDIAC CATHETERIZATION      COLON SURGERY      Bowel resection early 2018.    LAPAROSCOPIC ROBOT-ASSISTED SURGICAL REMOVAL OF KIDNEY USING DA IRMA XI Left      Family History     None        Tobacco Use    Smoking status: Former Smoker     Years: 25.00     Types: Cigarettes    Smokeless tobacco: Never Used   Substance and Sexual Activity    Alcohol use: No    Drug use: No    Sexual activity: Not Currently     Review of Systems   Constitutional: Negative for chills, fatigue, fever and unexpected weight change.   Respiratory: Negative for cough, shortness of breath, wheezing and stridor.    Cardiovascular: Negative for chest pain, palpitations and leg swelling.   Gastrointestinal: Positive for abdominal pain, diarrhea and nausea. Negative for abdominal distention, constipation and vomiting.   Genitourinary: Negative for difficulty urinating, dysuria, frequency, hematuria and urgency.   Musculoskeletal: Positive for back pain.   Skin: Negative for color change, pallor, rash and wound.   Hematological: Does not bruise/bleed easily.     Objective:     Vital Signs (Most Recent):  Temp: 98.4 °F (36.9 °C) (09/17/20 1544)  Pulse: 71 (09/17/20 1544)  Resp: 18 (09/17/20 1544)  BP: (!) 181/77 (09/17/20 1544)  SpO2: (!) 93 % (09/17/20 1544) Vital Signs (24h Range):  Temp:  [97.9 °F (36.6 °C)-99.2 °F (37.3 °C)] 98.4 °F (36.9 °C)  Pulse:  [57-78] 71  Resp:  [16-19] 18  SpO2:  [93 %-99 %] 93 " %  BP: (156-211)/(55-88) 181/77     Weight: 60.5 kg (133 lb 6.1 oz)  Body mass index is 24.4 kg/m².    Physical Exam  Vitals signs reviewed.   Constitutional:       Appearance: She is well-developed.   HENT:      Head: Normocephalic and atraumatic.   Neck:      Musculoskeletal: Neck supple.   Cardiovascular:      Rate and Rhythm: Normal rate and regular rhythm.   Pulmonary:      Effort: Pulmonary effort is normal.      Breath sounds: Normal breath sounds.   Abdominal:      General: There is no distension.      Palpations: Abdomen is soft.      Tenderness: There is no abdominal tenderness.      Comments: Well-healed lower midline scar   Skin:     General: Skin is warm and dry.   Neurological:      Mental Status: She is alert and oriented to person, place, and time.         Significant Labs:  CBC:   Recent Labs   Lab 09/17/20  0648   WBC 6.06   RBC 3.80*   HGB 9.1*   HCT 30.6*      MCV 81*   MCH 23.9*   MCHC 29.7*     CMP:   Recent Labs   Lab 09/17/20  0648   *   CALCIUM 9.2   ALBUMIN 3.6   PROT 6.9      K 4.1   CO2 23      BUN 7*   CREATININE 1.6*   ALKPHOS 63   ALT 12   AST 17   BILITOT 0.4       Significant Diagnostics:  I have reviewed all pertinent imaging results/findings within the past 24 hours.   CT:  Impression:  1.  Mild wall thickening of the ascending colon.  Could the patient's symptoms be related to colitis?  There are no surrounding inflammatory changes.  2.  Negative for acute process involving the abdomen or pelvis otherwise.  Negative for inflammatory changes.  Negative for right renal stone disease or hydronephrosis.  Negative for free air.  3. Small area of medial right lower lobe atelectasis/consolidation.  Early infectious process difficult to exclude in the right clinical setting.  Clinical correlation is advised.  4.  Moderate distal esophageal thickening and dilation, concerning for esophagitis.  Clinical correlation is advised.  Moderate size hiatal hernia noted as  well.  5.  3.1 cm right ovarian cyst.  In a postmenopausal female, this can be normal if the patient is on hormone replacement therapy.  Clinical correlation is advised.  6.  Gallbladder sludge.  7.  Numerous stable findings as noted above, to include prior left nephrectomy as well as postoperative changes to the transverse colon.    Hida:  FINDINGS:  The early images demonstrate homogeneous uptake of the radiopharmaceutical by the liver with no focal hepatic abnormalities.  Normal activity is present in the common bile duct and gallbladder.  Gallbladder appears moderately distended.  Initially there was delayed flow through the common bile duct which also appears somewhat distended.  After CCK administration contrast was seen in the small bowel.  The clearance from the liver is appropriate.  Following Cholecystokinin administration, the estimated gallbladder ejection fraction is  % at  minutes.  Impression:  The cystic and common bile ducts are patent.  There appears to be delayed excretion through the common bile duct with distended gallbladder and common bile duct.  Recommend correlation with LFTs to determine the need for MRCP or ERCP.  The gallbladder ejection fraction is 23 % at 30 minutes.  Findings are concerning for chronic cholecystitis    Assessment/Plan:     * Gastroenteritis and Colitis , unspecified   CT showing mild thickening of ascending colon  Self reported history of Crohn's disease although appears questionable as she was told by some of her physicians she does not have this.  Recently had EGD and colonoscopy at [facility]  No surgical intervention necessary at this time. Symptoms do not really correlate with the gallbladder. She had no reproduction of symptoms on her HIDA scan and she describes her pain as lower abdominal radiating to her back based on this would hold off on cholecystectomy as this is unlikely to resolve her current issues.     Acholic stools She had some delay  in excretion from her common bile duct and distention  but no elevated LFTs consider GI consult for further evaluation She has had extensive GI following at [facility] for these ongoing issues.       VTE Risk Mitigation (From admission, onward)         Ordered     heparin (porcine) injection 5,000 Units  Every 8 hours      09/16/20 1907     IP VTE HIGH RISK PATIENT  Once      09/16/20 1907     Place sequential compression device  Until discontinued      09/16/20 1907                Thank you for your consult. I will sign off. Please contact us if you have any additional questions.    Rakesh Alston MD  General Surgery  [facility]

## 2020-09-17 NOTE — PLAN OF CARE
Pt AAO x4.  Pt remains free of falls throughout shift.  Pt denies pain throughout shift.  Plan of care reviewed. Pt verbalizes understanding.  Pt moving and turning independently. Frequent weight shifting encouraged.  Normal sinus rhythm on monitor.  Hourly rounding completed.  Will continue to monitor.

## 2020-09-17 NOTE — HOSPITAL COURSE
9/17- no further diarrhea since admission . Abdominal pain is better . CT abd/pelvis on 9/14 resulted gallbladder sludge . HIDA scan done this morning resulted gallbladder ejection fraction is 23 % at 30 minutes.  Findings are concerning for chronic cholecystitis . Will consult General Surgery for input. Urine culture not resulted. Continue Rocephin. Oral Flagyl added.  She had additional episodes of abdominal pain and diarrhea. Stool studies were negative for any signs of infectious process. Review her of her recent colonoscopy showed evidence of microscopic colitis. Started daily course of budesonide. Her symptoms improved marginally but persisted. Pain and diarrhea were better controlled. Stopped antibiotics and collected stool sample for fecal fat and elastics. Discharge plan to return home with home health. Follow-up with own gastroenterologist. Complete a course of budesonide.

## 2020-09-17 NOTE — ASSESSMENT & PLAN NOTE
- Pt presenting with symptoms of nausea , abdominal pain and diarrhea   -Recent CT evidence of mild colitis   -X-ray abd today resulted nonobstructive bowel gas pattern   -Get Stool for WBC, culture and Clostridium difficile   -Intravenous hydration   -Antiemetics  , antispasmodic and other supportive treatment as needed    - HIDA scan on 9/17/20  resulted gallbladder ejection fraction is 23 % at 30 minutes.  Findings are concerning for chronic cholecystitis . Will consult General Surgery for input

## 2020-09-17 NOTE — SUBJECTIVE & OBJECTIVE
"Past Medical History:   Diagnosis Date    Bladder cancer     CAD (coronary artery disease)     Cervical disc disease     CHF (congestive heart failure)     Diabetes mellitus     Fibromyalgia     History of kidney cancer     Hyperlipidemia     Hypertension     Lumbar disc disease     Lupus     Malignant neoplasm of urinary bladder     Small bowel obstruction     Uncomplicated opioid dependence        Past Surgical History:   Procedure Laterality Date    CARDIAC CATHETERIZATION      COLON SURGERY      Bowel resection early 2018.    LAPAROSCOPIC ROBOT-ASSISTED SURGICAL REMOVAL OF KIDNEY USING DA IRMA XI Left        Review of patient's allergies indicates:   Allergen Reactions    Sulfa (sulfonamide antibiotics) Anaphylaxis     Throat closing    Ondansetron hcl (pf) Itching    Contrast media      IV contrast    Iodinated contrast media     Promethazine Other (See Comments)     Patient states that it causes a "restless feeling in legs and arms."       No current facility-administered medications on file prior to encounter.      Current Outpatient Medications on File Prior to Encounter   Medication Sig    albuterol (PROVENTIL/VENTOLIN HFA) 90 mcg/actuation inhaler Inhale 2 puffs into the lungs every 4 (four) hours as needed.    aspirin (ECOTRIN) 81 MG EC tablet Take 81 mg by mouth once daily.    dexlansoprazole (DEXILANT) 60 mg capsule Take 60 mg by mouth.    dicyclomine (BENTYL) 20 mg tablet     DULoxetine (CYMBALTA) 30 MG capsule Take 30 mg by mouth.    ergocalciferol (ERGOCALCIFEROL) 50,000 unit Cap Take 50,000 Units by mouth.    ergocalciferol, vitamin D2, (VITAMIN D ORAL) Take by mouth.    esomeprazole (NEXIUM) 20 MG capsule     levothyroxine (SYNTHROID) 25 MCG tablet Take 25 mcg by mouth once daily.    metoprolol tartrate (LOPRESSOR) 50 MG tablet Take 50 mg by mouth 2 (two) times daily.    omega-3 fatty acids/fish oil (FISH OIL-OMEGA-3 FATTY ACIDS) 300-1,000 mg capsule Take 2 g by " mouth.    pantoprazole (PROTONIX) 40 MG tablet Take 40 mg by mouth once daily.    predniSONE (DELTASONE) 20 MG tablet     pregabalin (LYRICA) 100 MG capsule Take 100 mg by mouth.    ranolazine (RANEXA) 500 MG Tb12 Take 500 mg by mouth 2 (two) times daily.    simvastatin (ZOCOR) 20 MG tablet Take 20 mg by mouth every evening.    tiZANidine (ZANAFLEX) 2 MG tablet Take 2 mg by mouth every 8 (eight) hours as needed.    vitamin A 8000 UNIT capsule Take 8,000 Units by mouth.    acetaminophen (TYLENOL) 500 MG tablet Take 1,000 mg by mouth.    amoxicillin-clavulanate 500-125mg (AUGMENTIN) 500-125 mg Tab     benzonatate (TESSALON) 200 MG capsule     ciprofloxacin HCl (CIPRO) 500 MG tablet Take 1 tablet (500 mg total) by mouth 2 (two) times daily. for 7 days    doxycycline (VIBRA-TABS) 100 MG tablet     HYDROcodone-acetaminophen (NORCO)  mg per tablet Take 1 tablet by mouth every 6 (six) hours as needed for Pain.    MELATONIN ORAL Take by mouth.    metroNIDAZOLE (FLAGYL) 500 MG tablet Take 1 tablet (500 mg total) by mouth 3 (three) times daily. for 7 days    nitroGLYCERIN (NITROSTAT) 0.3 MG SL tablet Place 0.3 mg under the tongue every 5 (five) minutes as needed for Chest pain.    ondansetron (ZOFRAN) 4 MG tablet     oxyCODONE (ROXICODONE) 15 MG Tab Take 10 mg by mouth every 4 (four) hours as needed for Pain.    promethazine (PHENERGAN) 25 MG tablet Take 1 tablet (25 mg total) by mouth every 6 (six) hours as needed for Nausea.    traMADoL (ULTRAM) 50 mg tablet      Family History     None        Tobacco Use    Smoking status: Former Smoker     Years: 25.00     Types: Cigarettes    Smokeless tobacco: Never Used   Substance and Sexual Activity    Alcohol use: No    Drug use: No    Sexual activity: Not Currently     Review of Systems   Constitutional: Positive for activity change, appetite change and fatigue. Negative for fever.        Generalized weakness   HENT: Negative for sore throat.    Eyes:  Negative for visual disturbance.   Respiratory: Negative for cough, chest tightness and shortness of breath.    Cardiovascular: Negative for chest pain, palpitations and leg swelling.   Gastrointestinal: Positive for abdominal pain, diarrhea and nausea. Negative for abdominal distention, constipation and vomiting.   Endocrine: Negative for polyuria.   Genitourinary: Positive for decreased urine volume. Negative for dysuria, flank pain, frequency and hematuria.   Musculoskeletal: Negative for back pain and gait problem.   Skin: Negative for rash.   Neurological: Negative for syncope, speech difficulty, weakness, light-headedness and headaches.   Psychiatric/Behavioral: Negative for confusion, hallucinations and sleep disturbance.     Objective:     Vital Signs (Most Recent):  Temp: 99.2 °F (37.3 °C) (09/16/20 1947)  Pulse: 78 (09/16/20 1947)  Resp: 18 (09/16/20 1947)  BP: (!) 211/87 (09/16/20 1947)  SpO2: 96 % (09/16/20 1947) Vital Signs (24h Range):  Temp:  [97.9 °F (36.6 °C)-99.2 °F (37.3 °C)] 99.2 °F (37.3 °C)  Pulse:  [63-84] 78  Resp:  [18-20] 18  SpO2:  [95 %-98 %] 96 %  BP: (136-211)/(55-92) 211/87     Weight: 57.2 kg (126 lb 1.7 oz)  Body mass index is 23.06 kg/m².    Physical Exam  Constitutional:       General: She is not in acute distress.     Appearance: She is well-developed. She is not diaphoretic.      Comments: Awake, oriented to self   HENT:      Head: Normocephalic and atraumatic.      Mouth/Throat:      Pharynx: No oropharyngeal exudate.   Eyes:      Conjunctiva/sclera: Conjunctivae normal.      Pupils: Pupils are equal, round, and reactive to light.   Neck:      Musculoskeletal: Normal range of motion and neck supple.      Thyroid: No thyromegaly.      Vascular: No JVD.   Cardiovascular:      Rate and Rhythm: Normal rate and regular rhythm.      Heart sounds: Normal heart sounds. No murmur.   Pulmonary:      Effort: Pulmonary effort is normal. No respiratory distress.      Breath sounds: Normal  breath sounds. No wheezing or rales.   Chest:      Chest wall: No tenderness.   Abdominal:      General: Bowel sounds are normal. There is no distension.      Palpations: Abdomen is soft.      Tenderness: There is abdominal tenderness (generalized ). There is no guarding or rebound.   Musculoskeletal: Normal range of motion.   Lymphadenopathy:      Cervical: No cervical adenopathy.   Skin:     General: Skin is warm and dry.      Findings: No rash.   Neurological:      Mental Status: She is oriented to person, place, and time.      Cranial Nerves: No cranial nerve deficit.      Sensory: No sensory deficit.      Deep Tendon Reflexes: Reflexes normal.           CRANIAL NERVES     CN III, IV, VI   Pupils are equal, round, and reactive to light.       Significant Labs:   Results for orders placed or performed during the hospital encounter of 09/16/20   CBC W/ AUTO DIFFERENTIAL   Result Value Ref Range    WBC 7.96 3.90 - 12.70 K/uL    RBC 4.24 4.00 - 5.40 M/uL    Hemoglobin 10.1 (L) 12.0 - 16.0 g/dL    Hematocrit 34.4 (L) 37.0 - 48.5 %    Mean Corpuscular Volume 81 (L) 82 - 98 fL    Mean Corpuscular Hemoglobin 23.8 (L) 27.0 - 31.0 pg    Mean Corpuscular Hemoglobin Conc 29.4 (L) 32.0 - 36.0 g/dL    RDW 16.3 (H) 11.5 - 14.5 %    Platelets 305 150 - 350 K/uL    MPV 9.9 9.2 - 12.9 fL    Immature Granulocytes 0.6 (H) 0.0 - 0.5 %    Gran # (ANC) 6.0 1.8 - 7.7 K/uL    Immature Grans (Abs) 0.05 (H) 0.00 - 0.04 K/uL    Lymph # 1.1 1.0 - 4.8 K/uL    Mono # 0.6 0.3 - 1.0 K/uL    Eos # 0.2 0.0 - 0.5 K/uL    Baso # 0.04 0.00 - 0.20 K/uL    nRBC 0 0 /100 WBC    Gran% 75.3 (H) 38.0 - 73.0 %    Lymph% 13.7 (L) 18.0 - 48.0 %    Mono% 8.0 4.0 - 15.0 %    Eosinophil% 1.9 0.0 - 8.0 %    Basophil% 0.5 0.0 - 1.9 %    Differential Method Automated    Comp. Metabolic Panel   Result Value Ref Range    Sodium 138 136 - 145 mmol/L    Potassium 3.8 3.5 - 5.1 mmol/L    Chloride 101 95 - 110 mmol/L    CO2 24 23 - 29 mmol/L    Glucose 130 (H) 70 - 110  mg/dL    BUN, Bld 8 8 - 23 mg/dL    Creatinine 1.7 (H) 0.5 - 1.4 mg/dL    Calcium 10.2 8.7 - 10.5 mg/dL    Total Protein 7.8 6.0 - 8.4 g/dL    Albumin 4.0 3.5 - 5.2 g/dL    Total Bilirubin 0.5 0.1 - 1.0 mg/dL    Alkaline Phosphatase 72 55 - 135 U/L    AST 15 10 - 40 U/L    ALT 10 10 - 44 U/L    Anion Gap 13 8 - 16 mmol/L    eGFR if African American 34 (A) >60 mL/min/1.73 m^2    eGFR if non African American 29 (A) >60 mL/min/1.73 m^2   Lipase   Result Value Ref Range    Lipase 7 4 - 60 U/L   Urinalysis, Reflex to Urine Culture Urine, Catheterized    Specimen: Urine   Result Value Ref Range    Specimen UA Urine, Catheterized     Color, UA Yellow Yellow, Straw, Rena    Appearance, UA Clear Clear    pH, UA 7.0 5.0 - 8.0    Specific Gravity, UA >=1.030 (A) 1.005 - 1.030    Protein, UA 1+ (A) Negative    Glucose, UA Negative Negative    Ketones, UA Negative Negative    Bilirubin (UA) 1+ (A) Negative    Occult Blood UA Negative Negative    Nitrite, UA Positive (A) Negative    Urobilinogen, UA Negative <2.0 EU/dL    Leukocytes, UA Negative Negative   COVID-19 Rapid Screening   Result Value Ref Range    SARS-CoV-2 RNA, Amplification, Qual Negative Negative   Urinalysis Microscopic   Result Value Ref Range    RBC, UA 4 0 - 4 /hpf    WBC, UA 3 0 - 5 /hpf    Bacteria None None-Occ /hpf    Hyaline Casts, UA 0 0-1/lpf /lpf    Microscopic Comment SEE COMMENT    Lactic acid, plasma   Result Value Ref Range    Lactate (Lactic Acid) 0.9 0.5 - 2.2 mmol/L   Procalcitonin   Result Value Ref Range    Procalcitonin 0.04 <0.25 ng/mL         Significant Imaging:   Imaging Results          US Pelvis Complete Non OB (Final result)  Result time 09/16/20 16:34:59    Final result by Cuauhtemoc Mera MD (09/16/20 16:34:59)                 Impression:      1. Atrophic uterus.  Endometrium measures 2 mm in width which is within normal limits for a postmenopausal patient.  2. Simple, benign appearing adnexal cyst the right ovary measures 3.3  cm in maximal dimension.  Finding correlates with the abnormality identified on prior CT dated 09/14/2020.      Electronically signed by: Cuauhtemoc Mera  Date:    09/16/2020  Time:    16:34             Narrative:    EXAMINATION:  US PELVIS COMPLETE NON OB    CLINICAL HISTORY:  pelvic pain;.  Pelvic pain.    COMPARISON:  No prior ultrasound available.  Prior CT dated 09/14/2020 has been reviewed.    FINDINGS:  The uterus measures 5.4 x 1.4 x 2.5 cm uterus is normal in appearance. Endometrium measures 2 mm in with.    The right ovary measures 3.2 x 2.8 x 3.3 cm.  An adnexal cyst of the right ovary is identified measuring up to 3.3 cm in maximal dimension.  Normal Doppler flow identified within the right ovary.    Left ovary cannot be visualized.    No free fluid or pelvic mass is detected.                               X-Ray Abdomen Flat And Erect (Final result)  Result time 09/16/20 14:09:44    Final result by Cuauhtemoc Mera MD (09/16/20 14:09:44)                 Impression:      Nonobstructive bowel gas pattern.  No change since 06/29/2019.      Electronically signed by: Cuauhtemoc Mera  Date:    09/16/2020  Time:    14:09             Narrative:    EXAMINATION:  XR ABDOMEN FLAT AND ERECT    CLINICAL HISTORY:  Abdominal Pain;    COMPARISON:  06/29/2019    FINDINGS:  Supine and upright views of the abdomen demonstrate no abnormal bowel dilatation.  No significant air-fluid levels.  Normal amount of stool.Surgical clips right abdomen and right upper pelvis.  No change.  Regional bones are unremarkable.

## 2020-09-17 NOTE — PROGRESS NOTES
[facility] Hospital Medicine  Progress Note    Patient Name: Su Callejas  MRN: [id number]  Patient Class: OP- Observation   Admission Date: 9/16/2020  Length of Stay: 0 days  Attending Physician: Caitlin Marte MD  Primary Care Provider: Rashawn Ortega DO        Subjective:     Principal Problem:Gastroenteritis        HPI:  The pt is a 75-year-old female with a PMHx of CAD (RCA stent ) , Hyperlipidemia,  Remote H/O left renal cell cancer status post nephrectomy, bladder cancer, chronic kidney disease III , bowel obstruction status post resection 2019, and chronic GERD with reported achalasia status post Heller myotomy (9/2019) and  Former Smoker  Returned to the ER for further evaluation of  4 weeks H/O  abdominal pain , nausea , decreased appetite and loose /watery stools. Pt had multiple ER visits  since early this month at [facility] and was seen at [facility] on 9/14/20 . Today pt c/o generalized  abdominal pain more in the upper abdomen or epigastric area and persistent nausea associated with 3 to 4 loose /watery stool a day. She denies vomiting to me though. Denies blood or mucous in the stool. Denies sick contact or recent travel out of the state or country . Denies fever , chills, night sweats , chest pain, SOB, diaphoresis , cough , chest congestion. Reports decreased appetite and unable to eat or take medication due to persistent nausea . CT abd/pelvis done on 9/14 resulted mild wall thickening of the ascending colon suggestive of colitis and X-ray abdomen today resulted nonobstructive bowel gas pattern . Other workup include labs were fairly unremarkable not consistent with pt's c/o 4 weeks of diarrhea. Lipase and lactic acid are normal. Serum creatinine slightly elevated from baseline and UA resulted increased Sp gravity and positive nitrite though WBC count 3 /HPF. Pt admitted for further evaluation and treatment of gastroenteritis and colitis.       Overview/Hospital Course:  9/17- no  further diarrhea since admission . Abdominal pain is better . CT abd/pelvis on 9/14 resulted gallbladder sludge . HIDA scan done this morning resulted gallbladder ejection fraction is 23 % at 30 minutes.  Findings are concerning for chronic cholecystitis . Will consult General Surgery for input. Urine culture not resulted. Continue Rocephin. Oral Flagyl added.     Interval History:    HIDA scan done this morning resulted gallbladder ejection fraction is 23 % at 30 minutes.  Findings are concerning for chronic cholecystitis . Will consult General Surgery for input      Review of Systems   Constitutional: Positive for activity change and appetite change. Negative for fever.   HENT: Negative for sore throat.    Eyes: Negative for visual disturbance.   Respiratory: Negative for cough, chest tightness and shortness of breath.    Cardiovascular: Negative for chest pain, palpitations and leg swelling.   Gastrointestinal: Positive for abdominal pain (better) and nausea (better). Negative for abdominal distention, constipation, diarrhea and vomiting.   Endocrine: Negative for polyuria.   Genitourinary: Negative for decreased urine volume, dysuria, flank pain, frequency and hematuria.   Musculoskeletal: Negative for back pain and gait problem.   Skin: Negative for rash.   Neurological: Negative for syncope, speech difficulty, weakness, light-headedness and headaches.   Psychiatric/Behavioral: Negative for confusion, hallucinations and sleep disturbance.     Objective:     Vital Signs (Most Recent):  Temp: 98.4 °F (36.9 °C) (09/17/20 1544)  Pulse: 71 (09/17/20 1544)  Resp: 18 (09/17/20 1544)  BP: (!) 181/77 (09/17/20 1544)  SpO2: (!) 93 % (09/17/20 1544) Vital Signs (24h Range):  Temp:  [97.9 °F (36.6 °C)-99.2 °F (37.3 °C)] 98.4 °F (36.9 °C)  Pulse:  [57-78] 71  Resp:  [16-19] 18  SpO2:  [93 %-99 %] 93 %  BP: (156-211)/(55-88) 181/77     Weight: 60.5 kg (133 lb 6.1 oz)  Body mass index is 24.4 kg/m².    Intake/Output Summary  (Last 24 hours) at 9/17/2020 1642  Last data filed at 9/17/2020 1600  Gross per 24 hour   Intake 2380 ml   Output 300 ml   Net 2080 ml      Physical Exam  Constitutional:       General: She is not in acute distress.     Appearance: She is well-developed. She is not diaphoretic.   HENT:      Head: Normocephalic and atraumatic.      Mouth/Throat:      Pharynx: No oropharyngeal exudate.   Eyes:      Conjunctiva/sclera: Conjunctivae normal.      Pupils: Pupils are equal, round, and reactive to light.   Neck:      Musculoskeletal: Normal range of motion and neck supple.      Thyroid: No thyromegaly.      Vascular: No JVD.   Cardiovascular:      Rate and Rhythm: Normal rate and regular rhythm.      Heart sounds: Normal heart sounds. No murmur.   Pulmonary:      Effort: Pulmonary effort is normal. No respiratory distress.      Breath sounds: Normal breath sounds. No wheezing or rales.   Chest:      Chest wall: No tenderness.   Abdominal:      General: Bowel sounds are normal. There is no distension.      Palpations: Abdomen is soft.      Tenderness: There is abdominal tenderness (mild generalized ). There is no guarding or rebound.   Musculoskeletal: Normal range of motion.   Lymphadenopathy:      Cervical: No cervical adenopathy.   Skin:     General: Skin is warm and dry.      Findings: No rash.   Neurological:      Mental Status: She is alert and oriented to person, place, and time.      Cranial Nerves: No cranial nerve deficit.      Sensory: No sensory deficit.      Deep Tendon Reflexes: Reflexes normal.         Significant Labs:   CBC:   Recent Labs   Lab 09/16/20  1326 09/17/20  0648   WBC 7.96 6.06   HGB 10.1* 9.1*   HCT 34.4* 30.6*    231     CMP:   Recent Labs   Lab 09/16/20  1326 09/17/20  0648    137   K 3.8 4.1    103   CO2 24 23   * 112*   BUN 8 7*   CREATININE 1.7* 1.6*   CALCIUM 10.2 9.2   PROT 7.8 6.9   ALBUMIN 4.0 3.6   BILITOT 0.5 0.4   ALKPHOS 72 63   AST 15 17   ALT 10 12    ANIONGAP 13 11   EGFRNONAA 29* 32*       Significant Imaging:       Assessment/Plan:      * Gastroenteritis and Colitis , unspecified   - Pt presenting with symptoms of nausea , abdominal pain and diarrhea   -Recent CT evidence of mild colitis   -X-ray abd today resulted nonobstructive bowel gas pattern   -Get Stool for WBC, culture and Clostridium difficile   -Intravenous hydration   -Antiemetics  , antispasmodic and other supportive treatment as needed    - HIDA scan on 9/17/20  resulted gallbladder ejection fraction is 23 % at 30 minutes.  Findings are concerning for chronic cholecystitis . Will consult General Surgery for input      H/O Coronary artery disease involving native coronary artery  -Resume Home meds - ASA, BB, Statin , Ranexa       UTI (urinary tract infection)  -Start Rocephin 1 gram IV daily   -Follow up urine culture       RYDER (acute kidney injury) on CKD stage III   - Baseline creatinine 1.4 to 1.6   -Intravenous hydration   -Monitor renal indices         VTE Risk Mitigation (From admission, onward)         Ordered     heparin (porcine) injection 5,000 Units  Every 8 hours      09/16/20 1907     IP VTE HIGH RISK PATIENT  Once      09/16/20 1907     Place sequential compression device  Until discontinued      09/16/20 1907                Discharge Planning   JENNIFER:      Code Status: Full Code   Is the patient medically ready for discharge?:     Reason for patient still in hospital (select all that apply): Patient trending condition                     Caitlin Marte MD  Department of Hospital Medicine   [facility]

## 2020-09-17 NOTE — H&P
[facility] Hospital Medicine  History & Physical    Patient Name: Su Callejas  MRN: [id number]  Admission Date: 9/16/2020  Attending Physician: Caitlin Marte MD  Primary Care Provider: Rashawn Ortega DO         Patient information was obtained from patient, past medical records and ER records.     Subjective:     Principal Problem:Gastroenteritis    Chief Complaint:   Chief Complaint   Patient presents with    Abdominal Pain     [id number]; radiates to back. seen here recently for same complaint        HPI: The pt is a 75-year-old female with a PMHx of CAD (RCA stent ) , Hyperlipidemia,  Remote H/O left renal cell cancer status post nephrectomy, bladder cancer, chronic kidney disease III , bowel obstruction status post resection 2019, and chronic GERD with reported achalasia status post Heller myotomy (9/2019) and  Former Smoker  Returned to the ER for further evaluation of  4 weeks H/O  abdominal pain , nausea , decreased appetite and loose /watery stools. Pt had multiple ER visits  since early this month at [facility] and was seen at [facility] on 9/14/20 . Today pt c/o generalized  abdominal pain more in the upper abdomen or epigastric area and persistent nausea associated with 3 to 4 loose /watery stool a day. She denies vomiting to me though. Denies blood or mucous in the stool. Denies sick contact or recent travel out of the state or country . Denies fever , chills, night sweats , chest pain, SOB, diaphoresis , cough , chest congestion. Reports decreased appetite and unable to eat or take medication due to persistent nausea . CT abd/pelvis done on 9/14 resulted mild wall thickening of the ascending colon suggestive of colitis and X-ray abdomen today resulted nonobstructive bowel gas pattern . Other workup include labs were fairly unremarkable not consistent with pt's c/o 4 weeks of diarrhea. Lipase and lactic acid are normal. Serum creatinine slightly elevated from baseline and UA resulted  "increased Sp gravity and positive nitrite though WBC count 3 /HPF. Pt admitted for further evaluation and treatment of gastroenteritis and colitis.     This pt is placed in observation under Hospital Medicine Service .    Past Medical History:   Diagnosis Date    Bladder cancer     CAD (coronary artery disease)     Cervical disc disease     CHF (congestive heart failure)     Diabetes mellitus     Fibromyalgia     History of kidney cancer     Hyperlipidemia     Hypertension     Lumbar disc disease     Lupus     Malignant neoplasm of urinary bladder     Small bowel obstruction     Uncomplicated opioid dependence        Past Surgical History:   Procedure Laterality Date    CARDIAC CATHETERIZATION      COLON SURGERY      Bowel resection early 2018.    LAPAROSCOPIC ROBOT-ASSISTED SURGICAL REMOVAL OF KIDNEY USING DA IRMA XI Left        Review of patient's allergies indicates:   Allergen Reactions    Sulfa (sulfonamide antibiotics) Anaphylaxis     Throat closing    Ondansetron hcl (pf) Itching    Contrast media      IV contrast    Iodinated contrast media     Promethazine Other (See Comments)     Patient states that it causes a "restless feeling in legs and arms."       No current facility-administered medications on file prior to encounter.      Current Outpatient Medications on File Prior to Encounter   Medication Sig    albuterol (PROVENTIL/VENTOLIN HFA) 90 mcg/actuation inhaler Inhale 2 puffs into the lungs every 4 (four) hours as needed.    aspirin (ECOTRIN) 81 MG EC tablet Take 81 mg by mouth once daily.    dexlansoprazole (DEXILANT) 60 mg capsule Take 60 mg by mouth.    dicyclomine (BENTYL) 20 mg tablet     DULoxetine (CYMBALTA) 30 MG capsule Take 30 mg by mouth.    ergocalciferol (ERGOCALCIFEROL) 50,000 unit Cap Take 50,000 Units by mouth.    ergocalciferol, vitamin D2, (VITAMIN D ORAL) Take by mouth.    esomeprazole (NEXIUM) 20 MG capsule     levothyroxine (SYNTHROID) 25 MCG tablet " Take 25 mcg by mouth once daily.    metoprolol tartrate (LOPRESSOR) 50 MG tablet Take 50 mg by mouth 2 (two) times daily.    omega-3 fatty acids/fish oil (FISH OIL-OMEGA-3 FATTY ACIDS) 300-1,000 mg capsule Take 2 g by mouth.    pantoprazole (PROTONIX) 40 MG tablet Take 40 mg by mouth once daily.    predniSONE (DELTASONE) 20 MG tablet     pregabalin (LYRICA) 100 MG capsule Take 100 mg by mouth.    ranolazine (RANEXA) 500 MG Tb12 Take 500 mg by mouth 2 (two) times daily.    simvastatin (ZOCOR) 20 MG tablet Take 20 mg by mouth every evening.    tiZANidine (ZANAFLEX) 2 MG tablet Take 2 mg by mouth every 8 (eight) hours as needed.    vitamin A 8000 UNIT capsule Take 8,000 Units by mouth.    acetaminophen (TYLENOL) 500 MG tablet Take 1,000 mg by mouth.    amoxicillin-clavulanate 500-125mg (AUGMENTIN) 500-125 mg Tab     benzonatate (TESSALON) 200 MG capsule     ciprofloxacin HCl (CIPRO) 500 MG tablet Take 1 tablet (500 mg total) by mouth 2 (two) times daily. for 7 days    doxycycline (VIBRA-TABS) 100 MG tablet     HYDROcodone-acetaminophen (NORCO)  mg per tablet Take 1 tablet by mouth every 6 (six) hours as needed for Pain.    MELATONIN ORAL Take by mouth.    metroNIDAZOLE (FLAGYL) 500 MG tablet Take 1 tablet (500 mg total) by mouth 3 (three) times daily. for 7 days    nitroGLYCERIN (NITROSTAT) 0.3 MG SL tablet Place 0.3 mg under the tongue every 5 (five) minutes as needed for Chest pain.    ondansetron (ZOFRAN) 4 MG tablet     oxyCODONE (ROXICODONE) 15 MG Tab Take 10 mg by mouth every 4 (four) hours as needed for Pain.    promethazine (PHENERGAN) 25 MG tablet Take 1 tablet (25 mg total) by mouth every 6 (six) hours as needed for Nausea.    traMADoL (ULTRAM) 50 mg tablet      Family History     None        Tobacco Use    Smoking status: Former Smoker     Years: 25.00     Types: Cigarettes    Smokeless tobacco: Never Used   Substance and Sexual Activity    Alcohol use: No    Drug use: No     Sexual activity: Not Currently     Review of Systems   Constitutional: Positive for activity change, appetite change and fatigue. Negative for fever.        Generalized weakness   HENT: Negative for sore throat.    Eyes: Negative for visual disturbance.   Respiratory: Negative for cough, chest tightness and shortness of breath.    Cardiovascular: Negative for chest pain, palpitations and leg swelling.   Gastrointestinal: Positive for abdominal pain, diarrhea and nausea. Negative for abdominal distention, constipation and vomiting.   Endocrine: Negative for polyuria.   Genitourinary: Positive for decreased urine volume. Negative for dysuria, flank pain, frequency and hematuria.   Musculoskeletal: Negative for back pain and gait problem.   Skin: Negative for rash.   Neurological: Negative for syncope, speech difficulty, weakness, light-headedness and headaches.   Psychiatric/Behavioral: Negative for confusion, hallucinations and sleep disturbance.     Objective:     Vital Signs (Most Recent):  Temp: 99.2 °F (37.3 °C) (09/16/20 1947)  Pulse: 78 (09/16/20 1947)  Resp: 18 (09/16/20 1947)  BP: (!) 211/87 (09/16/20 1947)  SpO2: 96 % (09/16/20 1947) Vital Signs (24h Range):  Temp:  [97.9 °F (36.6 °C)-99.2 °F (37.3 °C)] 99.2 °F (37.3 °C)  Pulse:  [63-84] 78  Resp:  [18-20] 18  SpO2:  [95 %-98 %] 96 %  BP: (136-211)/(55-92) 211/87     Weight: 57.2 kg (126 lb 1.7 oz)  Body mass index is 23.06 kg/m².    Physical Exam  Constitutional:       General: She is not in acute distress.     Appearance: She is well-developed. She is not diaphoretic.      Comments: Awake, oriented to self   HENT:      Head: Normocephalic and atraumatic.      Mouth/Throat:      Pharynx: No oropharyngeal exudate.   Eyes:      Conjunctiva/sclera: Conjunctivae normal.      Pupils: Pupils are equal, round, and reactive to light.   Neck:      Musculoskeletal: Normal range of motion and neck supple.      Thyroid: No thyromegaly.      Vascular: No JVD.    Cardiovascular:      Rate and Rhythm: Normal rate and regular rhythm.      Heart sounds: Normal heart sounds. No murmur.   Pulmonary:      Effort: Pulmonary effort is normal. No respiratory distress.      Breath sounds: Normal breath sounds. No wheezing or rales.   Chest:      Chest wall: No tenderness.   Abdominal:      General: Bowel sounds are normal. There is no distension.      Palpations: Abdomen is soft.      Tenderness: There is abdominal tenderness (generalized ). There is no guarding or rebound.   Musculoskeletal: Normal range of motion.   Lymphadenopathy:      Cervical: No cervical adenopathy.   Skin:     General: Skin is warm and dry.      Findings: No rash.   Neurological:      Mental Status: She is oriented to person, place, and time.      Cranial Nerves: No cranial nerve deficit.      Sensory: No sensory deficit.      Deep Tendon Reflexes: Reflexes normal.           CRANIAL NERVES     CN III, IV, VI   Pupils are equal, round, and reactive to light.       Significant Labs:   Results for orders placed or performed during the hospital encounter of 09/16/20   CBC W/ AUTO DIFFERENTIAL   Result Value Ref Range    WBC 7.96 3.90 - 12.70 K/uL    RBC 4.24 4.00 - 5.40 M/uL    Hemoglobin 10.1 (L) 12.0 - 16.0 g/dL    Hematocrit 34.4 (L) 37.0 - 48.5 %    Mean Corpuscular Volume 81 (L) 82 - 98 fL    Mean Corpuscular Hemoglobin 23.8 (L) 27.0 - 31.0 pg    Mean Corpuscular Hemoglobin Conc 29.4 (L) 32.0 - 36.0 g/dL    RDW 16.3 (H) 11.5 - 14.5 %    Platelets 305 150 - 350 K/uL    MPV 9.9 9.2 - 12.9 fL    Immature Granulocytes 0.6 (H) 0.0 - 0.5 %    Gran # (ANC) 6.0 1.8 - 7.7 K/uL    Immature Grans (Abs) 0.05 (H) 0.00 - 0.04 K/uL    Lymph # 1.1 1.0 - 4.8 K/uL    Mono # 0.6 0.3 - 1.0 K/uL    Eos # 0.2 0.0 - 0.5 K/uL    Baso # 0.04 0.00 - 0.20 K/uL    nRBC 0 0 /100 WBC    Gran% 75.3 (H) 38.0 - 73.0 %    Lymph% 13.7 (L) 18.0 - 48.0 %    Mono% 8.0 4.0 - 15.0 %    Eosinophil% 1.9 0.0 - 8.0 %    Basophil% 0.5 0.0 - 1.9 %     Differential Method Automated    Comp. Metabolic Panel   Result Value Ref Range    Sodium 138 136 - 145 mmol/L    Potassium 3.8 3.5 - 5.1 mmol/L    Chloride 101 95 - 110 mmol/L    CO2 24 23 - 29 mmol/L    Glucose 130 (H) 70 - 110 mg/dL    BUN, Bld 8 8 - 23 mg/dL    Creatinine 1.7 (H) 0.5 - 1.4 mg/dL    Calcium 10.2 8.7 - 10.5 mg/dL    Total Protein 7.8 6.0 - 8.4 g/dL    Albumin 4.0 3.5 - 5.2 g/dL    Total Bilirubin 0.5 0.1 - 1.0 mg/dL    Alkaline Phosphatase 72 55 - 135 U/L    AST 15 10 - 40 U/L    ALT 10 10 - 44 U/L    Anion Gap 13 8 - 16 mmol/L    eGFR if African American 34 (A) >60 mL/min/1.73 m^2    eGFR if non African American 29 (A) >60 mL/min/1.73 m^2   Lipase   Result Value Ref Range    Lipase 7 4 - 60 U/L   Urinalysis, Reflex to Urine Culture Urine, Catheterized    Specimen: Urine   Result Value Ref Range    Specimen UA Urine, Catheterized     Color, UA Yellow Yellow, Straw, Rena    Appearance, UA Clear Clear    pH, UA 7.0 5.0 - 8.0    Specific Gravity, UA >=1.030 (A) 1.005 - 1.030    Protein, UA 1+ (A) Negative    Glucose, UA Negative Negative    Ketones, UA Negative Negative    Bilirubin (UA) 1+ (A) Negative    Occult Blood UA Negative Negative    Nitrite, UA Positive (A) Negative    Urobilinogen, UA Negative <2.0 EU/dL    Leukocytes, UA Negative Negative   COVID-19 Rapid Screening   Result Value Ref Range    SARS-CoV-2 RNA, Amplification, Qual Negative Negative   Urinalysis Microscopic   Result Value Ref Range    RBC, UA 4 0 - 4 /hpf    WBC, UA 3 0 - 5 /hpf    Bacteria None None-Occ /hpf    Hyaline Casts, UA 0 0-1/lpf /lpf    Microscopic Comment SEE COMMENT    Lactic acid, plasma   Result Value Ref Range    Lactate (Lactic Acid) 0.9 0.5 - 2.2 mmol/L   Procalcitonin   Result Value Ref Range    Procalcitonin 0.04 <0.25 ng/mL         Significant Imaging:   Imaging Results          US Pelvis Complete Non OB (Final result)  Result time 09/16/20 16:34:59    Final result by Cuauhtemoc Mera MD (09/16/20  16:34:59)                 Impression:      1. Atrophic uterus.  Endometrium measures 2 mm in width which is within normal limits for a postmenopausal patient.  2. Simple, benign appearing adnexal cyst the right ovary measures 3.3 cm in maximal dimension.  Finding correlates with the abnormality identified on prior CT dated 09/14/2020.      Electronically signed by: Cuauhtemoc Mera  Date:    09/16/2020  Time:    16:34             Narrative:    EXAMINATION:  US PELVIS COMPLETE NON OB    CLINICAL HISTORY:  pelvic pain;.  Pelvic pain.    COMPARISON:  No prior ultrasound available.  Prior CT dated 09/14/2020 has been reviewed.    FINDINGS:  The uterus measures 5.4 x 1.4 x 2.5 cm uterus is normal in appearance. Endometrium measures 2 mm in with.    The right ovary measures 3.2 x 2.8 x 3.3 cm.  An adnexal cyst of the right ovary is identified measuring up to 3.3 cm in maximal dimension.  Normal Doppler flow identified within the right ovary.    Left ovary cannot be visualized.    No free fluid or pelvic mass is detected.                               X-Ray Abdomen Flat And Erect (Final result)  Result time 09/16/20 14:09:44    Final result by Cuauhtemoc Mera MD (09/16/20 14:09:44)                 Impression:      Nonobstructive bowel gas pattern.  No change since 06/29/2019.      Electronically signed by: Cuauhtemoc Mera  Date:    09/16/2020  Time:    14:09             Narrative:    EXAMINATION:  XR ABDOMEN FLAT AND ERECT    CLINICAL HISTORY:  Abdominal Pain;    COMPARISON:  06/29/2019    FINDINGS:  Supine and upright views of the abdomen demonstrate no abnormal bowel dilatation.  No significant air-fluid levels.  Normal amount of stool.Surgical clips right abdomen and right upper pelvis.  No change.  Regional bones are unremarkable.                                  Assessment/Plan:     * Gastroenteritis and Colitis , unspecified   - Pt presenting with symptoms of nausea , abdominal pain and diarrhea   -Recent  CT evidence of mild colitis   -X-ray abd today resulted nonobstructive bowel gas pattern   -Get Stool for WBC, culture and Clostridium difficile   -Intravenous hydration   -Antiemetics  , antispasmodic and other supportive treatment as needed        H/O Coronary artery disease involving native coronary artery  -Resume Home meds - ASA, BB, Statin , Ranexa       UTI (urinary tract infection)  -Start Rocephin 1 gram IV daily   -Follow up urine culture       RYDER (acute kidney injury) on CKD stage III   - Baseline creatinine 1.4 to 1.6   -Intravenous hydration   -Monitor renal indices         VTE Risk Mitigation (From admission, onward)         Ordered     heparin (porcine) injection 5,000 Units  Every 8 hours      09/16/20 1907     IP VTE HIGH RISK PATIENT  Once      09/16/20 1907     Place sequential compression device  Until discontinued      09/16/20 1907                   Caitlin Marte MD  Department of Hospital Medicine   [facility]

## 2020-09-17 NOTE — ASSESSMENT & PLAN NOTE
No surgical intervention necessary at this time. Symptoms do not really correlate with the gallbladder. She had no reproduction of symptoms on her HIDA scan and she describes her pain as lower abdominal radiating to her back based on this would hold off on cholecystectomy as this is unlikely to resolve her current issues.     Acholic stools She had some delay in excretion from her common bile duct and distention  but no elevated LFTs consider GI consult for further evaluation She has had extensive GI following at [facility] for these ongoing issues.

## 2020-09-17 NOTE — PLAN OF CARE
Problem: Fall Injury Risk  Goal: Absence of Fall and Fall-Related Injury  Outcome: Ongoing, Progressing     Problem: Adult Inpatient Plan of Care  Goal: Plan of Care Review  Outcome: Ongoing, Progressing  Goal: Patient-Specific Goal (Individualization)  Outcome: Ongoing, Progressing  Goal: Absence of Hospital-Acquired Illness or Injury  Outcome: Ongoing, Progressing  Goal: Optimal Comfort and Wellbeing  Outcome: Ongoing, Progressing  Goal: Readiness for Transition of Care  Outcome: Ongoing, Progressing  Goal: Rounds/Family Conference  Outcome: Ongoing, Progressing     Problem: Diabetes Comorbidity  Goal: Blood Glucose Level Within Desired Range  Outcome: Ongoing, Progressing     Problem: Electrolyte Imbalance (Acute Kidney Injury/Impairment)  Goal: Serum Electrolyte Balance  Outcome: Ongoing, Progressing     Problem: Fluid Imbalance (Acute Kidney Injury/Impairment)  Goal: Optimal Fluid Balance  Outcome: Ongoing, Progressing     Problem: Hematologic Alteration (Acute Kidney Injury/Impairment)  Goal: Hemoglobin, Hematocrit and Platelets Within Normal Range  Outcome: Ongoing, Progressing     Problem: Oral Intake Inadequate (Acute Kidney Injury/Impairment)  Goal: Optimal Nutrition Intake  Outcome: Ongoing, Progressing     Problem: Renal Function Impairment (Acute Kidney Injury/Impairment)  Goal: Effective Renal Function  Outcome: Ongoing, Progressing

## 2020-09-17 NOTE — ASSESSMENT & PLAN NOTE
CT showing mild thickening of ascending colon  Self reported history of Crohn's disease although appears questionable as she was told by some of her physicians she does not have this.  Recently had EGD and colonoscopy at [facility]

## 2020-09-17 NOTE — SUBJECTIVE & OBJECTIVE
No current facility-administered medications on file prior to encounter.      Current Outpatient Medications on File Prior to Encounter   Medication Sig    albuterol (PROVENTIL/VENTOLIN HFA) 90 mcg/actuation inhaler Inhale 2 puffs into the lungs every 4 (four) hours as needed.    aspirin (ECOTRIN) 81 MG EC tablet Take 81 mg by mouth once daily.    dexlansoprazole (DEXILANT) 60 mg capsule Take 60 mg by mouth.    dicyclomine (BENTYL) 20 mg tablet     DULoxetine (CYMBALTA) 30 MG capsule Take 30 mg by mouth.    ergocalciferol (ERGOCALCIFEROL) 50,000 unit Cap Take 50,000 Units by mouth.    ergocalciferol, vitamin D2, (VITAMIN D ORAL) Take by mouth.    esomeprazole (NEXIUM) 20 MG capsule     levothyroxine (SYNTHROID) 25 MCG tablet Take 25 mcg by mouth once daily.    metoprolol tartrate (LOPRESSOR) 50 MG tablet Take 50 mg by mouth 2 (two) times daily.    omega-3 fatty acids/fish oil (FISH OIL-OMEGA-3 FATTY ACIDS) 300-1,000 mg capsule Take 2 g by mouth.    pantoprazole (PROTONIX) 40 MG tablet Take 40 mg by mouth once daily.    predniSONE (DELTASONE) 20 MG tablet     pregabalin (LYRICA) 100 MG capsule Take 100 mg by mouth.    ranolazine (RANEXA) 500 MG Tb12 Take 500 mg by mouth 2 (two) times daily.    simvastatin (ZOCOR) 20 MG tablet Take 20 mg by mouth every evening.    tiZANidine (ZANAFLEX) 2 MG tablet Take 2 mg by mouth every 8 (eight) hours as needed.    vitamin A 8000 UNIT capsule Take 8,000 Units by mouth.    acetaminophen (TYLENOL) 500 MG tablet Take 1,000 mg by mouth.    amoxicillin-clavulanate 500-125mg (AUGMENTIN) 500-125 mg Tab     benzonatate (TESSALON) 200 MG capsule     ciprofloxacin HCl (CIPRO) 500 MG tablet Take 1 tablet (500 mg total) by mouth 2 (two) times daily. for 7 days    doxycycline (VIBRA-TABS) 100 MG tablet     HYDROcodone-acetaminophen (NORCO)  mg per tablet Take 1 tablet by mouth every 6 (six) hours as needed for Pain.    MELATONIN ORAL Take by mouth.     "metroNIDAZOLE (FLAGYL) 500 MG tablet Take 1 tablet (500 mg total) by mouth 3 (three) times daily. for 7 days    nitroGLYCERIN (NITROSTAT) 0.3 MG SL tablet Place 0.3 mg under the tongue every 5 (five) minutes as needed for Chest pain.    ondansetron (ZOFRAN) 4 MG tablet     oxyCODONE (ROXICODONE) 15 MG Tab Take 10 mg by mouth every 4 (four) hours as needed for Pain.    promethazine (PHENERGAN) 25 MG tablet Take 1 tablet (25 mg total) by mouth every 6 (six) hours as needed for Nausea.    traMADoL (ULTRAM) 50 mg tablet        Review of patient's allergies indicates:   Allergen Reactions    Sulfa (sulfonamide antibiotics) Anaphylaxis     Throat closing    Ondansetron hcl (pf) Itching    Contrast media      IV contrast    Iodinated contrast media     Promethazine Other (See Comments)     Patient states that it causes a "restless feeling in legs and arms."       Past Medical History:   Diagnosis Date    Bladder cancer     CAD (coronary artery disease)     Cervical disc disease     CHF (congestive heart failure)     Diabetes mellitus     Fibromyalgia     History of kidney cancer     Hyperlipidemia     Hypertension     Lumbar disc disease     Lupus     Malignant neoplasm of urinary bladder     Small bowel obstruction     Uncomplicated opioid dependence      Past Surgical History:   Procedure Laterality Date    CARDIAC CATHETERIZATION      COLON SURGERY      Bowel resection early 2018.    LAPAROSCOPIC ROBOT-ASSISTED SURGICAL REMOVAL OF KIDNEY USING DA IRMA XI Left      Family History     None        Tobacco Use    Smoking status: Former Smoker     Years: 25.00     Types: Cigarettes    Smokeless tobacco: Never Used   Substance and Sexual Activity    Alcohol use: No    Drug use: No    Sexual activity: Not Currently     Review of Systems   Constitutional: Negative for chills, fatigue, fever and unexpected weight change.   Respiratory: Negative for cough, shortness of breath, wheezing and " stridor.    Cardiovascular: Negative for chest pain, palpitations and leg swelling.   Gastrointestinal: Positive for abdominal pain, diarrhea and nausea. Negative for abdominal distention, constipation and vomiting.   Genitourinary: Negative for difficulty urinating, dysuria, frequency, hematuria and urgency.   Musculoskeletal: Positive for back pain.   Skin: Negative for color change, pallor, rash and wound.   Hematological: Does not bruise/bleed easily.     Objective:     Vital Signs (Most Recent):  Temp: 98.4 °F (36.9 °C) (09/17/20 1544)  Pulse: 71 (09/17/20 1544)  Resp: 18 (09/17/20 1544)  BP: (!) 181/77 (09/17/20 1544)  SpO2: (!) 93 % (09/17/20 1544) Vital Signs (24h Range):  Temp:  [97.9 °F (36.6 °C)-99.2 °F (37.3 °C)] 98.4 °F (36.9 °C)  Pulse:  [57-78] 71  Resp:  [16-19] 18  SpO2:  [93 %-99 %] 93 %  BP: (156-211)/(55-88) 181/77     Weight: 60.5 kg (133 lb 6.1 oz)  Body mass index is 24.4 kg/m².    Physical Exam  Vitals signs reviewed.   Constitutional:       Appearance: She is well-developed.   HENT:      Head: Normocephalic and atraumatic.   Neck:      Musculoskeletal: Neck supple.   Cardiovascular:      Rate and Rhythm: Normal rate and regular rhythm.   Pulmonary:      Effort: Pulmonary effort is normal.      Breath sounds: Normal breath sounds.   Abdominal:      General: There is no distension.      Palpations: Abdomen is soft.      Tenderness: There is no abdominal tenderness.      Comments: Well-healed lower midline scar   Skin:     General: Skin is warm and dry.   Neurological:      Mental Status: She is alert and oriented to person, place, and time.         Significant Labs:  CBC:   Recent Labs   Lab 09/17/20  0648   WBC 6.06   RBC 3.80*   HGB 9.1*   HCT 30.6*      MCV 81*   MCH 23.9*   MCHC 29.7*     CMP:   Recent Labs   Lab 09/17/20  0648   *   CALCIUM 9.2   ALBUMIN 3.6   PROT 6.9      K 4.1   CO2 23      BUN 7*   CREATININE 1.6*   ALKPHOS 63   ALT 12   AST 17   BILITOT 0.4        Significant Diagnostics:  I have reviewed all pertinent imaging results/findings within the past 24 hours.   CT:  Impression:  1.  Mild wall thickening of the ascending colon.  Could the patient's symptoms be related to colitis?  There are no surrounding inflammatory changes.  2.  Negative for acute process involving the abdomen or pelvis otherwise.  Negative for inflammatory changes.  Negative for right renal stone disease or hydronephrosis.  Negative for free air.  3. Small area of medial right lower lobe atelectasis/consolidation.  Early infectious process difficult to exclude in the right clinical setting.  Clinical correlation is advised.  4.  Moderate distal esophageal thickening and dilation, concerning for esophagitis.  Clinical correlation is advised.  Moderate size hiatal hernia noted as well.  5.  3.1 cm right ovarian cyst.  In a postmenopausal female, this can be normal if the patient is on hormone replacement therapy.  Clinical correlation is advised.  6.  Gallbladder sludge.  7.  Numerous stable findings as noted above, to include prior left nephrectomy as well as postoperative changes to the transverse colon.    Hida:  FINDINGS:  The early images demonstrate homogeneous uptake of the radiopharmaceutical by the liver with no focal hepatic abnormalities.  Normal activity is present in the common bile duct and gallbladder.  Gallbladder appears moderately distended.  Initially there was delayed flow through the common bile duct which also appears somewhat distended.  After CCK administration contrast was seen in the small bowel.  The clearance from the liver is appropriate.  Following Cholecystokinin administration, the estimated gallbladder ejection fraction is  % at  minutes.  Impression:  The cystic and common bile ducts are patent.  There appears to be delayed excretion through the common bile duct with distended gallbladder and common bile duct.  Recommend correlation with LFTs to determine the  need for MRCP or ERCP.  The gallbladder ejection fraction is 23 % at 30 minutes.  Findings are concerning for chronic cholecystitis

## 2020-09-17 NOTE — ASSESSMENT & PLAN NOTE
- Pt presenting with symptoms of nausea , abdominal pain and diarrhea   -Recent CT evidence of mild colitis   -X-ray abd today resulted nonobstructive bowel gas pattern   -Get Stool for WBC, culture and Clostridium difficile   -Intravenous hydration   -Antiemetics  , antispasmodic and other supportive treatment as needed

## 2020-09-18 PROBLEM — E44.0 MODERATE MALNUTRITION: Status: ACTIVE | Noted: 2020-09-18

## 2020-09-18 LAB
ALBUMIN SERPL BCP-MCNC: 3.4 G/DL (ref 3.5–5.2)
ALP SERPL-CCNC: 63 U/L (ref 55–135)
ALT SERPL W/O P-5'-P-CCNC: 8 U/L (ref 10–44)
ANION GAP SERPL CALC-SCNC: 13 MMOL/L (ref 8–16)
AST SERPL-CCNC: 14 U/L (ref 10–40)
BASOPHILS # BLD AUTO: 0.04 K/UL (ref 0–0.2)
BASOPHILS NFR BLD: 0.8 % (ref 0–1.9)
BILIRUB SERPL-MCNC: 0.2 MG/DL (ref 0.1–1)
BUN SERPL-MCNC: 9 MG/DL (ref 8–23)
CALCIUM SERPL-MCNC: 9 MG/DL (ref 8.7–10.5)
CHLORIDE SERPL-SCNC: 99 MMOL/L (ref 95–110)
CO2 SERPL-SCNC: 23 MMOL/L (ref 23–29)
CREAT SERPL-MCNC: 1.7 MG/DL (ref 0.5–1.4)
CRP SERPL-MCNC: 17.1 MG/L (ref 0–8.2)
DIFFERENTIAL METHOD: ABNORMAL
EOSINOPHIL # BLD AUTO: 0.3 K/UL (ref 0–0.5)
EOSINOPHIL NFR BLD: 6.8 % (ref 0–8)
ERYTHROCYTE [DISTWIDTH] IN BLOOD BY AUTOMATED COUNT: 16.2 % (ref 11.5–14.5)
EST. GFR  (AFRICAN AMERICAN): 34 ML/MIN/1.73 M^2
EST. GFR  (NON AFRICAN AMERICAN): 29 ML/MIN/1.73 M^2
GLUCOSE SERPL-MCNC: 100 MG/DL (ref 70–110)
HCT VFR BLD AUTO: 31.5 % (ref 37–48.5)
HGB BLD-MCNC: 9.2 G/DL (ref 12–16)
IMM GRANULOCYTES # BLD AUTO: 0.02 K/UL (ref 0–0.04)
IMM GRANULOCYTES NFR BLD AUTO: 0.4 % (ref 0–0.5)
LYMPHOCYTES # BLD AUTO: 1.8 K/UL (ref 1–4.8)
LYMPHOCYTES NFR BLD: 35.9 % (ref 18–48)
MAGNESIUM SERPL-MCNC: 1.7 MG/DL (ref 1.6–2.6)
MCH RBC QN AUTO: 23.8 PG (ref 27–31)
MCHC RBC AUTO-ENTMCNC: 29.2 G/DL (ref 32–36)
MCV RBC AUTO: 81 FL (ref 82–98)
MONOCYTES # BLD AUTO: 0.6 K/UL (ref 0.3–1)
MONOCYTES NFR BLD: 11.5 % (ref 4–15)
NEUTROPHILS # BLD AUTO: 2.2 K/UL (ref 1.8–7.7)
NEUTROPHILS NFR BLD: 44.6 % (ref 38–73)
NRBC BLD-RTO: 0 /100 WBC
OB PNL STL: POSITIVE
PHOSPHATE SERPL-MCNC: 3.5 MG/DL (ref 2.7–4.5)
PLATELET # BLD AUTO: 244 K/UL (ref 150–350)
PMV BLD AUTO: 9.9 FL (ref 9.2–12.9)
POTASSIUM SERPL-SCNC: 3.7 MMOL/L (ref 3.5–5.1)
PROT SERPL-MCNC: 6.5 G/DL (ref 6–8.4)
RBC # BLD AUTO: 3.87 M/UL (ref 4–5.4)
SODIUM SERPL-SCNC: 135 MMOL/L (ref 136–145)
WBC # BLD AUTO: 4.87 K/UL (ref 3.9–12.7)
WBC #/AREA STL HPF: NORMAL /[HPF]

## 2020-09-18 PROCEDURE — 99900037 HC PT THERAPY SCREENING (STAT)

## 2020-09-18 PROCEDURE — 80053 COMPREHEN METABOLIC PANEL: CPT

## 2020-09-18 PROCEDURE — 96372 THER/PROPH/DIAG INJ SC/IM: CPT

## 2020-09-18 PROCEDURE — 86140 C-REACTIVE PROTEIN: CPT

## 2020-09-18 PROCEDURE — 83735 ASSAY OF MAGNESIUM: CPT

## 2020-09-18 PROCEDURE — 36415 COLL VENOUS BLD VENIPUNCTURE: CPT

## 2020-09-18 PROCEDURE — 87427 SHIGA-LIKE TOXIN AG IA: CPT | Mod: 59

## 2020-09-18 PROCEDURE — 97116 GAIT TRAINING THERAPY: CPT

## 2020-09-18 PROCEDURE — 85025 COMPLETE CBC W/AUTO DIFF WBC: CPT

## 2020-09-18 PROCEDURE — 97162 PT EVAL MOD COMPLEX 30 MIN: CPT

## 2020-09-18 PROCEDURE — 97802 MEDICAL NUTRITION INDIV IN: CPT

## 2020-09-18 PROCEDURE — 63600175 PHARM REV CODE 636 W HCPCS: Performed by: INTERNAL MEDICINE

## 2020-09-18 PROCEDURE — 84100 ASSAY OF PHOSPHORUS: CPT

## 2020-09-18 PROCEDURE — 89055 LEUKOCYTE ASSESSMENT FECAL: CPT

## 2020-09-18 PROCEDURE — 87045 FECES CULTURE AEROBIC BACT: CPT

## 2020-09-18 PROCEDURE — 82272 OCCULT BLD FECES 1-3 TESTS: CPT

## 2020-09-18 PROCEDURE — 25000003 PHARM REV CODE 250: Performed by: INTERNAL MEDICINE

## 2020-09-18 PROCEDURE — 96376 TX/PRO/DX INJ SAME DRUG ADON: CPT

## 2020-09-18 PROCEDURE — G0378 HOSPITAL OBSERVATION PER HR: HCPCS

## 2020-09-18 PROCEDURE — 87046 STOOL CULTR AEROBIC BACT EA: CPT

## 2020-09-18 RX ADMIN — CEFTRIAXONE 1 G: 1 INJECTION, SOLUTION INTRAVENOUS at 08:09

## 2020-09-18 RX ADMIN — HYDROCODONE BITARTRATE AND ACETAMINOPHEN 1 TABLET: 10; 325 TABLET ORAL at 06:09

## 2020-09-18 RX ADMIN — METRONIDAZOLE 500 MG: 500 TABLET ORAL at 01:09

## 2020-09-18 RX ADMIN — METRONIDAZOLE 500 MG: 500 TABLET ORAL at 06:09

## 2020-09-18 RX ADMIN — RANOLAZINE 500 MG: 500 TABLET, FILM COATED, EXTENDED RELEASE ORAL at 08:09

## 2020-09-18 RX ADMIN — PANTOPRAZOLE SODIUM 40 MG: 40 TABLET, DELAYED RELEASE ORAL at 08:09

## 2020-09-18 RX ADMIN — METOPROLOL TARTRATE 50 MG: 50 TABLET, FILM COATED ORAL at 11:09

## 2020-09-18 RX ADMIN — SIMVASTATIN 20 MG: 20 TABLET, FILM COATED ORAL at 08:09

## 2020-09-18 RX ADMIN — AMLODIPINE BESYLATE 5 MG: 5 TABLET ORAL at 08:09

## 2020-09-18 RX ADMIN — ASPIRIN 81 MG: 81 TABLET, COATED ORAL at 08:09

## 2020-09-18 RX ADMIN — METRONIDAZOLE 500 MG: 500 TABLET ORAL at 11:09

## 2020-09-18 RX ADMIN — DULOXETINE 30 MG: 30 CAPSULE, DELAYED RELEASE ORAL at 08:09

## 2020-09-18 RX ADMIN — METOPROLOL TARTRATE 50 MG: 50 TABLET, FILM COATED ORAL at 08:09

## 2020-09-18 RX ADMIN — HYDROCODONE BITARTRATE AND ACETAMINOPHEN 1 TABLET: 10; 325 TABLET ORAL at 01:09

## 2020-09-18 RX ADMIN — HYDROCODONE BITARTRATE AND ACETAMINOPHEN 1 TABLET: 10; 325 TABLET ORAL at 08:09

## 2020-09-18 RX ADMIN — HEPARIN SODIUM 5000 UNITS: 5000 INJECTION INTRAVENOUS; SUBCUTANEOUS at 06:09

## 2020-09-18 RX ADMIN — LEVOTHYROXINE SODIUM 25 MCG: 25 TABLET ORAL at 06:09

## 2020-09-18 RX ADMIN — HEPARIN SODIUM 5000 UNITS: 5000 INJECTION INTRAVENOUS; SUBCUTANEOUS at 11:09

## 2020-09-18 RX ADMIN — HEPARIN SODIUM 5000 UNITS: 5000 INJECTION INTRAVENOUS; SUBCUTANEOUS at 01:09

## 2020-09-18 RX ADMIN — AMLODIPINE BESYLATE 5 MG: 5 TABLET ORAL at 11:09

## 2020-09-18 NOTE — PLAN OF CARE
Met with patient completed initial discharge planning assessment. Patient is independent with adls and iadls.Patient's daughter is in town to assist her upon discharge.   Patient denies any post hospital needs or services at this time. Patient may benefit from home health, she would also like a new rollator due to the fact that the one she has scratches her floors. Updated white board with 's name and number. Transitional Care Folder, Discharge Planning Begins on Admission pamphlet, [facility] Bedside Delivery pamphlet, Advance Directive information given to patient along with the contact information given.Instructed patient or family to call with any questions or concerns.         D/C Plan:benito  PCP:HERNÁN Ortega DO  Preferred Pharmacy:mail order  Discharge transportation:pov  My [facility]:active  Pharmacy Bedside Delivery: yes         09/18/20 4481   Discharge Assessment   Assessment Type Discharge Planning Assessment   Confirmed/corrected address and phone number on facesheet? Yes   Assessment information obtained from? Patient;Medical Record   Expected Length of Stay (days)   (tbd)   Communicated expected length of stay with patient/caregiver no   Prior to hospitilization cognitive status: Alert/Oriented   Prior to hospitalization functional status: Assistive Equipment   Current cognitive status: Alert/Oriented   Current Functional Status: Needs Assistance   Facility Arrived From: home   Lives With alone   Able to Return to Prior Arrangements yes   Is patient able to care for self after discharge? Unable to determine at this time (comments)   Who are your caregiver(s) and their phone number(s)? Lea Braswell( daughter ) [phone]   Patient's perception of discharge disposition home or selfcare   Readmission Within the Last 30 Days no previous admission in last 30 days   Patient currently being followed by outpatient case management? No   Patient currently receives any other outside agency services?  No   Equipment Currently Used at Home walker, rolling;rollator;grab bar   Do you have any problems affording any of your prescribed medications? No   Is the patient taking medications as prescribed? yes   Does the patient have transportation home? Yes   Transportation Anticipated family or friend will provide   Does the patient receive services at the [facility]? No   Discharge Plan A Home;Home with family   Discharge Plan B Home;Home Health   DME Needed Upon Discharge  bath bench;rollator   Patient/Family in Agreement with Plan yes

## 2020-09-18 NOTE — PT/OT/SLP EVAL
Physical Therapy Evaluation    Patient Name:  Su Callejas   MRN:  [id number]    Recommendations:     Discharge Recommendations:  nursing facility, skilled, home health PT(SNF VS HHPT DEPENDING ON PROGRESS WITH POC)   Discharge Equipment Recommendations: bedside commode, bath bench, walker, rolling   Barriers to discharge: Decreased caregiver support    Assessment:     Su Callejas is a 75 y.o. female admitted with a medical diagnosis of Gastroenteritis.  She presents with the following impairments/functional limitations:  weakness, impaired endurance, impaired balance, gait instability, impaired functional mobilty, decreased coordination.    Rehab Prognosis: Good; patient would benefit from acute skilled PT services to address these deficits and reach maximum level of function.    Recent Surgery: * No surgery found *    Plan:     During this hospitalization, patient to be seen 5 x/week to address the identified rehab impairments via gait training, therapeutic activities, therapeutic exercises and progress toward the following goals:    · Plan of Care Expires:  09/25/20    Subjective     Chief Complaint: DIARRHEA, HEADACHE, NAUSEA  Patient/Family Comments/goals:   Pain/Comfort:  · Pain Rating 1: 8/10  · Location 1: head  · Pain Addressed 1: Cessation of Activity, Reposition    Patients cultural, spiritual, Jainism conflicts given the current situation:      Living Environment:  PT LIVES ALONE, NO FAMILY SUPPORT, PT [facility] IN [facility], 4TH FLOOR WITH ELEVATOR ACCESS, PT REPORTS MOSTLY BED BOUND FOR APPROX. 5 WEEKS, AMB SHORT HOUSEHOLD DISTANCES, DOES NOT DRIVE, INDEP WITH ADL'S  Prior to admission, patients level of function was INDEP.  Equipment used at home: walker, standard.  DME owned (not currently used): none.  Upon discharge, patient will have assistance from ?.    Objective:     Communicated with NURSE WU prior to session.  Patient found supine with telemetry, peripheral IV   upon PT entry to room.    General Precautions: Standard, fall   Orthopedic Precautions:N/A   Braces: N/A     Exams:  · Cognitive Exam:  Patient is oriented to Person, Place, Time and Situation  · Postural Exam:  Patient presented with the following abnormalities:    · -       Rounded shoulders  · -       Forward head  · Sensation:    · -       Intact  · RLE ROM: WFL  · RLE Strength: GROSSLY 3+/5  · LLE ROM: WFL  · LLE Strength: GROSSLY 3+/5    Functional Mobility:  · Bed Mobility:     · Rolling Left:  stand by assistance  · Scooting: stand by assistance  · Supine to Sit: stand by assistance  · Transfers:     · Sit to Stand:  minimum assistance with rolling walker  · Gait: PT AMB 20' IN ROOM WITH RW AND BALWINDER, SLOW PACE, FLEX FWD OVER RW, CUES FOR UPRIGHT POSTURE AND RW SAFETY, QUICK TO FATIGUE  · Balance: POOR+    Therapeutic Activities and Exercises:   PT EDUCATED IN ROLE OF P.T. AND POC, PT EDUCATED IN RW USE AND SAFETY DURING TF'S AND GAIT, PT DECLINED SITTING IN CHAIR AT THIS TIME, WANTS TO RETURN TO BED, PT EDUCATED ON IMPORTANCE OF OOB TIME    AM-PAC 6 CLICK MOBILITY  Total Score:18     Patient left HOB elevated with all lines intact, call button in reach, NURSE notified and DAUGHTER present.    GOALS:   Multidisciplinary Problems     Physical Therapy Goals        Problem: Physical Therapy Goal    Goal Priority Disciplines Outcome Goal Variances Interventions   Physical Therapy Goal     PT, PT/OT      Description: LTG'S TO BE MET IN 7 DAYS (9-25-20)  1. PT WILL BE GERI FOR BED MOBILITY  2. PT WILL REQUIRE CGA FOR TF'S  3. PT WILL ' WITH RW AND CGA                   History:     Past Medical History:   Diagnosis Date    Bladder cancer     CAD (coronary artery disease)     Cervical disc disease     CHF (congestive heart failure)     Diabetes mellitus     Fibromyalgia     History of kidney cancer     Hyperlipidemia     Hypertension     Lumbar disc disease     Lupus     Malignant neoplasm of  urinary bladder     Small bowel obstruction     Uncomplicated opioid dependence        Past Surgical History:   Procedure Laterality Date    CARDIAC CATHETERIZATION      COLON SURGERY      Bowel resection early 2018.    LAPAROSCOPIC ROBOT-ASSISTED SURGICAL REMOVAL OF KIDNEY USING DA IRMA XI Left        Time Tracking:     PT Received On: 09/18/20  PT Start Time: 1100     PT Stop Time: 1125  PT Total Time (min): 25 min     Billable Minutes: Evaluation 15 and Gait Training 10    Marge Cruz, PT  09/18/2020

## 2020-09-18 NOTE — ASSESSMENT & PLAN NOTE
Nutrition Problem:  Moderate Protein-Calorie Malnutrition  Malnutrition in the context of Chronic Illness/Injury    Related to (etiology):  Physiological causes resulting in anorexia or diminished intake    Signs and Symptoms (as evidenced by):  Energy Intake: <75% of estimated energy requirement for > 1 week  Weight Loss: >7.5% x 3 months  Hx of GI disease- SBO and resection, achalasia, biliary dyskinesia    Interventions(treatment strategy):  Collaboration with other providers  Nutrition prescription- soft diet  Medical food supplements- Boost Glucose Control    Nutrition Diagnosis Status:  New

## 2020-09-18 NOTE — PT/OT/SLP PROGRESS
Physical Therapy      Patient Name:  Su Callejas   MRN:  [id number]    Patient not seen today secondary to Other (Comment)(ALIA CADENA INITIATED THIS AM VIA CHART REVIEW, ATTEMPTED ALIA CADENA, PT DECLINED AT THIS TIME DUE TO C/O DIZZINESS AND DIARRHEA, C/O HEADACHE 8/10, WILL ASSESS PT LATER THIS AM).     Marge Cruz, PT   9/18/2020  0825

## 2020-09-18 NOTE — PLAN OF CARE
P.T. EVAL COMPLETE, PT CURRENTLY REQUIRES SBA FOR BED MOBILITY, BALWINDER FOR TF'S AND GAIT, P.T. RECOMMENDS SNF VS HHPT DEPENDING ON PROGRESS WITH POC

## 2020-09-18 NOTE — PLAN OF CARE
Plan of care reviewed with patient at bedside; verbalized understanding.  Pt is AAOX4 and VSS  Pt remained afebrile throughout this shift.   IV and PO antibiotics administered.   Pt pain managed per PRN medications and heat application.  Pt moving/turning independent. Frequent weight shifting encouraged.  Bed low, side rails up x 2, wheels locked, call light in reach.  PIV intact. Cardiac monitor in place.  No c/o or questions at this time.   Will continue to monitor.

## 2020-09-18 NOTE — PLAN OF CARE
Recommendations    Recommendation:   1. Add Boost glucose control TID to diet order  2. Consider change to renal diet  3. RD to f/u    Goals: Meet >75% EEN/EPN by RD f/u  Nutrition Goal Status: new  Communication of RD Recs: POC

## 2020-09-18 NOTE — PROGRESS NOTES
"  [facility] -   Adult Nutrition  Progress Note    SUMMARY     Recommendations    Recommendation:   1. Add Boost glucose control TID to diet order  2. Consider change to renal diet  3. RD to f/u    Goals: Meet >75% EEN/EPN by RD f/u  Nutrition Goal Status: new  Communication of RD Recs: POC    Reason for Assessment    Reason For Assessment: identified at risk by screening criteria  Diagnosis: (Gastroenteritis and Colitis)  Relevant Medical History: Bladder cancer, Type 2 DM, Lupus, CAD, biliary dyskinesia, CKD, achalasia, SBO, CHF    General Information Comments: RD assessed pt due to MST screen. Pt presented with abdominal pain and diarrhea, and has been to [facility] for the same many times in the past few months. She has been operated on for achalasia multiple times before and recently had a colonscopy and EGD. She is currently on telemetry with a bariatric diet order, and oral intake has been minimal. Per surgery, no need for surgery at this time, but he does suggest GI consult. Pt was with other providers at assessment attempts, but she meets observational NFPE criteria for moderate malnutrition in the context of chronic illness, documented in greater detail below.     Nutrition Discharge Planning: renal diet     Nutrition Risk Screen    Nutrition Risk Screen: no indicators present    Nutrition/Diet History    Vitamin/Mineral/Herbal Supplements: fish oil  Food Allergies: NKFA  Factors Affecting Nutritional Intake: abdominal pain, altered gastrointestinal function, diarrhea, nausea/vomiting    Anthropometrics    Temp: 97.3 °F (36.3 °C)  Height: 5' 2" (157.5 cm)  Height (inches): 62 in  Weight Method: Bed Scale  Weight: 62 kg (136 lb 11 oz)  Weight (lb): 136.69 lb  Ideal Body Weight (IBW), Female: 110 lb  % Ideal Body Weight, Female (lb): 114.64 %  BMI (Calculated): 25  BMI Grade: 25 - 29.9 - overweight     Lab/Procedures/Meds  Pertinent Labs: reviewed  Na 135, Cr 1.7, EGFR 29, Albumin 3.4, ALT " 8  Pertinent Medications: reviewed  Duloxetine, Heparin, levothyroxine, lopressor, protonix, simvastatin    Estimated/Assessed Needs    Weight Used For Calorie Calculations: 62 kg (136 lb 11 oz)  Energy Calorie Requirements (kcal): 1300- 1500  Energy Need Method: [place]-St Jeor(x 1.2- 1.4)  Protein Requirements: 60- 75g(1-1.2g/kg per older adult, malnutrition)  Weight Used For Protein Calculations: 62 kg (136 lb 11 oz)     Estimated Fluid Requirement Method: RDA Method(or per MD)  RDA Method (mL): 1300  CHO Requirement: 160g      Nutrition Prescription Ordered    Current Diet Order: bariatric soft    Evaluation of Received Nutrient/Fluid Intake       Intake/Output Summary (Last 24 hours) at 9/18/2020 1422  Last data filed at 9/18/2020 0600  Gross per 24 hour   Intake 1298.33 ml   Output --   Net 1298.33 ml     % Intake of Estimated Energy Needs: 0 - 25 %  % Meal Intake: 0 - 25 %    Nutrition Risk    2x week    Assessment and Plan    Moderate malnutrition  Nutrition Problem:  Moderate Protein-Calorie Malnutrition  Malnutrition in the context of Chronic Illness/Injury    Related to (etiology):  Physiological causes resulting in anorexia or diminished intake    Signs and Symptoms (as evidenced by):  Energy Intake: <75% of estimated energy requirement for > 1 week  Weight Loss: >7.5% x 3 months  Hx of GI disease- SBO and resection, achalasia, biliary dyskinesia    Interventions(treatment strategy):  Collaboration with other providers  Nutrition prescription- soft diet  Medical food supplements- Boost Glucose Control    Nutrition Diagnosis Status:  New    Monitor and Evaluation    Food and Nutrient Intake: food and beverage intake  Food and Nutrient Adminstration: diet order  Anthropometric Measurements: weight, weight change  Biochemical Data, Medical Tests and Procedures: electrolyte and renal panel, gastrointestinal profile, glucose/endocrine profile, inflammatory profile, lipid profile  Nutrition-Focused Physical  Findings: overall appearance     Malnutrition Assessment             Weight Loss (Malnutrition): greater than 7.5% in 3 months  Energy Intake (Malnutrition): less than 75% for greater than 7 days                       Nutrition Follow-Up    RD Follow-up?: Yes    Thanks!  Leroy Olivarez MPH RD LDN

## 2020-09-18 NOTE — PLAN OF CARE
POC reviewed, verbalized understanding. Pt remained free from falls, fall precautions in place. Pt is SB-NSR on monitor, 8618. VSS. No other c/o at this time. PIV intact. RA. Call bell and personal belongings within reach. Hourly rounding complete. Reminded to call for assistance. Will continue to monitor.

## 2020-09-19 PROBLEM — D50.9 IRON DEFICIENCY ANEMIA: Status: ACTIVE | Noted: 2020-09-19

## 2020-09-19 LAB
ALBUMIN SERPL BCP-MCNC: 3.3 G/DL (ref 3.5–5.2)
ANION GAP SERPL CALC-SCNC: 9 MMOL/L (ref 8–16)
BACTERIA UR CULT: NORMAL
BASOPHILS # BLD AUTO: 0.04 K/UL (ref 0–0.2)
BASOPHILS NFR BLD: 0.7 % (ref 0–1.9)
BUN SERPL-MCNC: 16 MG/DL (ref 8–23)
CALCIUM SERPL-MCNC: 8.9 MG/DL (ref 8.7–10.5)
CHLORIDE SERPL-SCNC: 104 MMOL/L (ref 95–110)
CO2 SERPL-SCNC: 25 MMOL/L (ref 23–29)
CREAT SERPL-MCNC: 1.9 MG/DL (ref 0.5–1.4)
CRP SERPL-MCNC: 10.61 MG/L (ref 0–3.19)
DIFFERENTIAL METHOD: ABNORMAL
EOSINOPHIL # BLD AUTO: 0.4 K/UL (ref 0–0.5)
EOSINOPHIL NFR BLD: 7 % (ref 0–8)
ERYTHROCYTE [DISTWIDTH] IN BLOOD BY AUTOMATED COUNT: 16.5 % (ref 11.5–14.5)
EST. GFR  (AFRICAN AMERICAN): 30 ML/MIN/1.73 M^2
EST. GFR  (NON AFRICAN AMERICAN): 26 ML/MIN/1.73 M^2
FERRITIN SERPL-MCNC: 10 NG/ML (ref 20–300)
GLUCOSE SERPL-MCNC: 108 MG/DL (ref 70–110)
HCT VFR BLD AUTO: 31.2 % (ref 37–48.5)
HGB BLD-MCNC: 8.8 G/DL (ref 12–16)
IMM GRANULOCYTES # BLD AUTO: 0.03 K/UL (ref 0–0.04)
IMM GRANULOCYTES NFR BLD AUTO: 0.5 % (ref 0–0.5)
IRON SERPL-MCNC: 18 UG/DL (ref 30–160)
LYMPHOCYTES # BLD AUTO: 2 K/UL (ref 1–4.8)
LYMPHOCYTES NFR BLD: 35.5 % (ref 18–48)
MCH RBC QN AUTO: 23.6 PG (ref 27–31)
MCHC RBC AUTO-ENTMCNC: 28.2 G/DL (ref 32–36)
MCV RBC AUTO: 84 FL (ref 82–98)
MONOCYTES # BLD AUTO: 0.6 K/UL (ref 0.3–1)
MONOCYTES NFR BLD: 10.7 % (ref 4–15)
NEUTROPHILS # BLD AUTO: 2.6 K/UL (ref 1.8–7.7)
NEUTROPHILS NFR BLD: 45.6 % (ref 38–73)
NRBC BLD-RTO: 0 /100 WBC
PHOSPHATE SERPL-MCNC: 3.9 MG/DL (ref 2.7–4.5)
PLATELET # BLD AUTO: 262 K/UL (ref 150–350)
PMV BLD AUTO: 10.7 FL (ref 9.2–12.9)
POTASSIUM SERPL-SCNC: 4.2 MMOL/L (ref 3.5–5.1)
RBC # BLD AUTO: 3.73 M/UL (ref 4–5.4)
SATURATED IRON: 4 % (ref 20–50)
SODIUM SERPL-SCNC: 138 MMOL/L (ref 136–145)
TOTAL IRON BINDING CAPACITY: 425 UG/DL (ref 250–450)
TRANSFERRIN SERPL-MCNC: 287 MG/DL (ref 200–375)
WBC # BLD AUTO: 5.61 K/UL (ref 3.9–12.7)

## 2020-09-19 PROCEDURE — 96375 TX/PRO/DX INJ NEW DRUG ADDON: CPT

## 2020-09-19 PROCEDURE — 80069 RENAL FUNCTION PANEL: CPT

## 2020-09-19 PROCEDURE — 85025 COMPLETE CBC W/AUTO DIFF WBC: CPT

## 2020-09-19 PROCEDURE — 86141 C-REACTIVE PROTEIN HS: CPT

## 2020-09-19 PROCEDURE — 25000003 PHARM REV CODE 250: Performed by: INTERNAL MEDICINE

## 2020-09-19 PROCEDURE — 63600175 PHARM REV CODE 636 W HCPCS: Performed by: INTERNAL MEDICINE

## 2020-09-19 PROCEDURE — 96372 THER/PROPH/DIAG INJ SC/IM: CPT

## 2020-09-19 PROCEDURE — 83540 ASSAY OF IRON: CPT

## 2020-09-19 PROCEDURE — 82728 ASSAY OF FERRITIN: CPT

## 2020-09-19 PROCEDURE — 36415 COLL VENOUS BLD VENIPUNCTURE: CPT

## 2020-09-19 PROCEDURE — G0378 HOSPITAL OBSERVATION PER HR: HCPCS

## 2020-09-19 RX ORDER — BUDESONIDE 3 MG/1
9 CAPSULE, COATED PELLETS ORAL DAILY
Status: DISCONTINUED | OUTPATIENT
Start: 2020-09-19 | End: 2020-09-20 | Stop reason: HOSPADM

## 2020-09-19 RX ORDER — SODIUM CHLORIDE, SODIUM LACTATE, POTASSIUM CHLORIDE, CALCIUM CHLORIDE 600; 310; 30; 20 MG/100ML; MG/100ML; MG/100ML; MG/100ML
INJECTION, SOLUTION INTRAVENOUS CONTINUOUS
Status: ACTIVE | OUTPATIENT
Start: 2020-09-19 | End: 2020-09-19

## 2020-09-19 RX ORDER — LOPERAMIDE HYDROCHLORIDE 2 MG/1
2 CAPSULE ORAL 4 TIMES DAILY PRN
Status: DISCONTINUED | OUTPATIENT
Start: 2020-09-19 | End: 2020-09-20 | Stop reason: HOSPADM

## 2020-09-19 RX ORDER — SODIUM CHLORIDE, SODIUM LACTATE, POTASSIUM CHLORIDE, CALCIUM CHLORIDE 600; 310; 30; 20 MG/100ML; MG/100ML; MG/100ML; MG/100ML
INJECTION, SOLUTION INTRAVENOUS CONTINUOUS
Status: DISCONTINUED | OUTPATIENT
Start: 2020-09-19 | End: 2020-09-19

## 2020-09-19 RX ORDER — L. ACIDOPHILUS/L.BULGARICUS 100MM CELL
1 GRANULES IN PACKET (EA) ORAL 3 TIMES DAILY
Status: DISCONTINUED | OUTPATIENT
Start: 2020-09-19 | End: 2020-09-20 | Stop reason: HOSPADM

## 2020-09-19 RX ADMIN — METRONIDAZOLE 500 MG: 500 TABLET ORAL at 05:09

## 2020-09-19 RX ADMIN — HEPARIN SODIUM 5000 UNITS: 5000 INJECTION INTRAVENOUS; SUBCUTANEOUS at 09:09

## 2020-09-19 RX ADMIN — METRONIDAZOLE 500 MG: 500 TABLET ORAL at 01:09

## 2020-09-19 RX ADMIN — AMLODIPINE BESYLATE 5 MG: 5 TABLET ORAL at 08:09

## 2020-09-19 RX ADMIN — IRON SUCROSE 200 MG: 20 INJECTION, SOLUTION INTRAVENOUS at 03:09

## 2020-09-19 RX ADMIN — LEVOTHYROXINE SODIUM 25 MCG: 25 TABLET ORAL at 05:09

## 2020-09-19 RX ADMIN — METOPROLOL TARTRATE 50 MG: 50 TABLET, FILM COATED ORAL at 08:09

## 2020-09-19 RX ADMIN — METOPROLOL TARTRATE 50 MG: 50 TABLET, FILM COATED ORAL at 09:09

## 2020-09-19 RX ADMIN — HEPARIN SODIUM 5000 UNITS: 5000 INJECTION INTRAVENOUS; SUBCUTANEOUS at 01:09

## 2020-09-19 RX ADMIN — LOPERAMIDE HYDROCHLORIDE 2 MG: 2 CAPSULE ORAL at 04:09

## 2020-09-19 RX ADMIN — SIMVASTATIN 20 MG: 20 TABLET, FILM COATED ORAL at 09:09

## 2020-09-19 RX ADMIN — LACTOBACILLUS ACIDOPHILUS / LACTOBACILLUS BULGARICUS 1 EACH: 100 MILLION CFU STRENGTH GRANULES at 03:09

## 2020-09-19 RX ADMIN — ASPIRIN 81 MG: 81 TABLET, COATED ORAL at 08:09

## 2020-09-19 RX ADMIN — PANTOPRAZOLE SODIUM 40 MG: 40 TABLET, DELAYED RELEASE ORAL at 08:09

## 2020-09-19 RX ADMIN — AMLODIPINE BESYLATE 5 MG: 5 TABLET ORAL at 09:09

## 2020-09-19 RX ADMIN — RANOLAZINE 500 MG: 500 TABLET, FILM COATED, EXTENDED RELEASE ORAL at 09:09

## 2020-09-19 RX ADMIN — HEPARIN SODIUM 5000 UNITS: 5000 INJECTION INTRAVENOUS; SUBCUTANEOUS at 05:09

## 2020-09-19 RX ADMIN — SODIUM CHLORIDE, SODIUM LACTATE, POTASSIUM CHLORIDE, AND CALCIUM CHLORIDE: .6; .31; .03; .02 INJECTION, SOLUTION INTRAVENOUS at 03:09

## 2020-09-19 RX ADMIN — DULOXETINE 30 MG: 30 CAPSULE, DELAYED RELEASE ORAL at 08:09

## 2020-09-19 RX ADMIN — RANOLAZINE 500 MG: 500 TABLET, FILM COATED, EXTENDED RELEASE ORAL at 08:09

## 2020-09-19 RX ADMIN — BUDESONIDE 9 MG: 3 CAPSULE, GELATIN COATED ORAL at 03:09

## 2020-09-19 RX ADMIN — LACTOBACILLUS ACIDOPHILUS / LACTOBACILLUS BULGARICUS 1 EACH: 100 MILLION CFU STRENGTH GRANULES at 09:09

## 2020-09-19 RX ADMIN — HYDROCODONE BITARTRATE AND ACETAMINOPHEN 1 TABLET: 10; 325 TABLET ORAL at 08:09

## 2020-09-19 NOTE — PLAN OF CARE
Patient continues to be monitored and treated. IV abx administered. SubQ heparin administered for VTE. Therapeutic communication utilized. Patient running sinus aman and normal sinus on the monitor. Will continue to monitor and treat.

## 2020-09-19 NOTE — SUBJECTIVE & OBJECTIVE
Interval History:  Briefly had reduction in abdominal pain and return of appetite.  Tolerated a small amount of food.  Symptoms did not resolve and subsequently got worse.  Occasional vomiting.  Overall intermittently improved.    Review of Systems   Constitutional: Positive for activity change and appetite change. Negative for fever.   HENT: Negative for sore throat.    Eyes: Negative for visual disturbance.   Respiratory: Negative for cough, chest tightness and shortness of breath.    Cardiovascular: Negative for chest pain, palpitations and leg swelling.   Gastrointestinal: Positive for abdominal pain (better) and nausea (better). Negative for abdominal distention, constipation, diarrhea and vomiting.   Endocrine: Negative for polyuria.   Genitourinary: Negative for decreased urine volume, dysuria, flank pain, frequency and hematuria.   Musculoskeletal: Negative for back pain and gait problem.   Skin: Negative for rash.   Neurological: Negative for syncope, speech difficulty, weakness, light-headedness and headaches.   Psychiatric/Behavioral: Negative for confusion, hallucinations and sleep disturbance.     Objective:     Vital Signs (Most Recent):  Temp: 98.2 °F (36.8 °C) (09/18/20 2309)  Pulse: 61 (09/18/20 2309)  Resp: 20 (09/18/20 2309)  BP: 139/67 (09/18/20 2309)  SpO2: (!) 92 % (09/18/20 2309) Vital Signs (24h Range):  Temp:  [97.3 °F (36.3 °C)-98.4 °F (36.9 °C)] 98.2 °F (36.8 °C)  Pulse:  [56-65] 61  Resp:  [16-20] 20  SpO2:  [92 %-96 %] 92 %  BP: (111-141)/(56-70) 139/67     Weight: 62 kg (136 lb 11 oz)  Body mass index is 25 kg/m².    Intake/Output Summary (Last 24 hours) at 9/18/2020 2317  Last data filed at 9/18/2020 1700  Gross per 24 hour   Intake 1230 ml   Output --   Net 1230 ml      Physical Exam  Constitutional:       General: She is not in acute distress.     Appearance: She is well-developed. She is not diaphoretic.   HENT:      Head: Normocephalic and atraumatic.      Mouth/Throat:       Pharynx: No oropharyngeal exudate.   Eyes:      Conjunctiva/sclera: Conjunctivae normal.      Pupils: Pupils are equal, round, and reactive to light.   Neck:      Musculoskeletal: Normal range of motion and neck supple.      Thyroid: No thyromegaly.      Vascular: No JVD.   Cardiovascular:      Rate and Rhythm: Normal rate and regular rhythm.      Heart sounds: Normal heart sounds. No murmur.   Pulmonary:      Effort: Pulmonary effort is normal. No respiratory distress.      Breath sounds: Normal breath sounds. No wheezing or rales.   Chest:      Chest wall: No tenderness.   Abdominal:      General: Bowel sounds are normal. There is no distension.      Palpations: Abdomen is soft.      Tenderness: There is abdominal tenderness (mild generalized ). There is no guarding or rebound.   Musculoskeletal: Normal range of motion.   Lymphadenopathy:      Cervical: No cervical adenopathy.   Skin:     General: Skin is warm and dry.      Findings: No rash.   Neurological:      Mental Status: She is alert and oriented to person, place, and time.      Cranial Nerves: No cranial nerve deficit.      Sensory: No sensory deficit.      Deep Tendon Reflexes: Reflexes normal.         Significant Labs:   CBC:   Recent Labs   Lab 09/17/20  0648 09/18/20  0712   WBC 6.06 4.87   HGB 9.1* 9.2*   HCT 30.6* 31.5*    244     CMP:   Recent Labs   Lab 09/17/20  0648 09/18/20  0712    135*   K 4.1 3.7    99   CO2 23 23   * 100   BUN 7* 9   CREATININE 1.6* 1.7*   CALCIUM 9.2 9.0   PROT 6.9 6.5   ALBUMIN 3.6 3.4*   BILITOT 0.4 0.2   ALKPHOS 63 63   AST 17 14   ALT 12 8*   ANIONGAP 11 13   EGFRNONAA 32* 29*       Significant Imaging:

## 2020-09-19 NOTE — PT/OT/SLP PROGRESS
Physical Therapy      Patient Name:  Su Callejas   MRN:  [id number]    Patient not seen today secondary to adamant refusal. Patient reported feeling ill and was unable to hold down any food or liquid at this time due to vomiting. Will follow-up during next scheduled therapy session.     Chayo Copeland, PTA

## 2020-09-19 NOTE — PROGRESS NOTES
[facility] Hospital Medicine  Progress Note    Patient Name: Su Callejas  MRN: [id number]  Patient Class: OP- Observation   Admission Date: 9/16/2020  Length of Stay: 0 days  Attending Physician: Jesse Real MD  Primary Care Provider: Rashawn Ortega DO        Subjective:     Principal Problem:Gastroenteritis        HPI:  The pt is a 75-year-old female with a PMHx of CAD (RCA stent ) , Hyperlipidemia,  Remote H/O left renal cell cancer status post nephrectomy, bladder cancer, chronic kidney disease III , bowel obstruction status post resection 2019, and chronic GERD with reported achalasia status post Heller myotomy (9/2019) and  Former Smoker  Returned to the ER for further evaluation of  4 weeks H/O  abdominal pain , nausea , decreased appetite and loose /watery stools. Pt had multiple ER visits  since early this month at [facility] and was seen at [facility] on 9/14/20 . Today pt c/o generalized  abdominal pain more in the upper abdomen or epigastric area and persistent nausea associated with 3 to 4 loose /watery stool a day. She denies vomiting to me though. Denies blood or mucous in the stool. Denies sick contact or recent travel out of the state or country . Denies fever , chills, night sweats , chest pain, SOB, diaphoresis , cough , chest congestion. Reports decreased appetite and unable to eat or take medication due to persistent nausea . CT abd/pelvis done on 9/14 resulted mild wall thickening of the ascending colon suggestive of colitis and X-ray abdomen today resulted nonobstructive bowel gas pattern . Other workup include labs were fairly unremarkable not consistent with pt's c/o 4 weeks of diarrhea. Lipase and lactic acid are normal. Serum creatinine slightly elevated from baseline and UA resulted increased Sp gravity and positive nitrite though WBC count 3 /HPF. Pt admitted for further evaluation and treatment of gastroenteritis and colitis.       Overview/Hospital  Course:  9/17- no further diarrhea since admission . Abdominal pain is better . CT abd/pelvis on 9/14 resulted gallbladder sludge . HIDA scan done this morning resulted gallbladder ejection fraction is 23 % at 30 minutes.  Findings are concerning for chronic cholecystitis . Will consult General Surgery for input. Urine culture not resulted. Continue Rocephin. Oral Flagyl added.     Interval History:  Briefly had reduction in abdominal pain and return of appetite.  Tolerated a small amount of food.  Symptoms did not resolve and subsequently got worse.  Occasional vomiting.  Overall intermittently improved.    Review of Systems   Constitutional: Positive for activity change and appetite change. Negative for fever.   HENT: Negative for sore throat.    Eyes: Negative for visual disturbance.   Respiratory: Negative for cough, chest tightness and shortness of breath.    Cardiovascular: Negative for chest pain, palpitations and leg swelling.   Gastrointestinal: Positive for abdominal pain (better) and nausea (better). Negative for abdominal distention, constipation, diarrhea and vomiting.   Endocrine: Negative for polyuria.   Genitourinary: Negative for decreased urine volume, dysuria, flank pain, frequency and hematuria.   Musculoskeletal: Negative for back pain and gait problem.   Skin: Negative for rash.   Neurological: Negative for syncope, speech difficulty, weakness, light-headedness and headaches.   Psychiatric/Behavioral: Negative for confusion, hallucinations and sleep disturbance.     Objective:     Vital Signs (Most Recent):  Temp: 98.2 °F (36.8 °C) (09/18/20 2309)  Pulse: 61 (09/18/20 2309)  Resp: 20 (09/18/20 2309)  BP: 139/67 (09/18/20 2309)  SpO2: (!) 92 % (09/18/20 2309) Vital Signs (24h Range):  Temp:  [97.3 °F (36.3 °C)-98.4 °F (36.9 °C)] 98.2 °F (36.8 °C)  Pulse:  [56-65] 61  Resp:  [16-20] 20  SpO2:  [92 %-96 %] 92 %  BP: (111-141)/(56-70) 139/67     Weight: 62 kg (136 lb 11 oz)  Body mass index is 25  kg/m².    Intake/Output Summary (Last 24 hours) at 9/18/2020 2317  Last data filed at 9/18/2020 1700  Gross per 24 hour   Intake 1230 ml   Output --   Net 1230 ml      Physical Exam  Constitutional:       General: She is not in acute distress.     Appearance: She is well-developed. She is not diaphoretic.   HENT:      Head: Normocephalic and atraumatic.      Mouth/Throat:      Pharynx: No oropharyngeal exudate.   Eyes:      Conjunctiva/sclera: Conjunctivae normal.      Pupils: Pupils are equal, round, and reactive to light.   Neck:      Musculoskeletal: Normal range of motion and neck supple.      Thyroid: No thyromegaly.      Vascular: No JVD.   Cardiovascular:      Rate and Rhythm: Normal rate and regular rhythm.      Heart sounds: Normal heart sounds. No murmur.   Pulmonary:      Effort: Pulmonary effort is normal. No respiratory distress.      Breath sounds: Normal breath sounds. No wheezing or rales.   Chest:      Chest wall: No tenderness.   Abdominal:      General: Bowel sounds are normal. There is no distension.      Palpations: Abdomen is soft.      Tenderness: There is abdominal tenderness (mild generalized ). There is no guarding or rebound.   Musculoskeletal: Normal range of motion.   Lymphadenopathy:      Cervical: No cervical adenopathy.   Skin:     General: Skin is warm and dry.      Findings: No rash.   Neurological:      Mental Status: She is alert and oriented to person, place, and time.      Cranial Nerves: No cranial nerve deficit.      Sensory: No sensory deficit.      Deep Tendon Reflexes: Reflexes normal.         Significant Labs:   CBC:   Recent Labs   Lab 09/17/20  0648 09/18/20  0712   WBC 6.06 4.87   HGB 9.1* 9.2*   HCT 30.6* 31.5*    244     CMP:   Recent Labs   Lab 09/17/20  0648 09/18/20  0712    135*   K 4.1 3.7    99   CO2 23 23   * 100   BUN 7* 9   CREATININE 1.6* 1.7*   CALCIUM 9.2 9.0   PROT 6.9 6.5   ALBUMIN 3.6 3.4*   BILITOT 0.4 0.2   ALKPHOS 63 63    AST 17 14   ALT 12 8*   ANIONGAP 11 13   EGFRNONAA 32* 29*       Significant Imaging:       Assessment/Plan:      * Gastroenteritis and Colitis , unspecified   - Pt presenting with symptoms of nausea , abdominal pain and diarrhea   -Recent CT evidence of mild colitis   -X-ray abd today resulted nonobstructive bowel gas pattern   -Get Stool for WBC, culture  -Intravenous hydration   -Antiemetics  , antispasmodic and other supportive treatment as needed    - HIDA scan on 9/17/20  resulted gallbladder ejection fraction is 23 % at 30 minutes.  Findings are concerning for chronic cholecystitis . General Surgery evaluated and does not think symptoms related to gallbladder disease.  No surgical indication.    H/O Coronary artery disease involving native coronary artery  -Resume Home meds - ASA, BB, Statin , Ranexa       UTI (urinary tract infection)  -Start Rocephin 1 gram IV daily   -Follow up urine culture       RYDER (acute kidney injury) on CKD stage III   - Baseline creatinine 1.4 to 1.6   -Intravenous hydration   -Monitor renal indices         VTE Risk Mitigation (From admission, onward)         Ordered     heparin (porcine) injection 5,000 Units  Every 8 hours      09/16/20 1907     IP VTE HIGH RISK PATIENT  Once      09/16/20 1907     Place sequential compression device  Until discontinued      09/16/20 1907                Discharge Planning   JENNIFER:      Code Status: Full Code   Is the patient medically ready for discharge?:     Reason for patient still in hospital (select all that apply): Patient trending condition, Treatment and Consult recommendations  Discharge Plan A: Home, Home with family                  Jesse Real MD  Department of Hospital Medicine   [facility]

## 2020-09-19 NOTE — PLAN OF CARE
POC reviewed, verbalized understanding. Pt remained free from falls, fall precautions in place. Pt is SB-NSR on monitor, 8618. VSS. No other c/o at this time. PIV intact, infusing LR at 125. RA. PRN norco 10 and imodium given. Call bell and personal belongings within reach. Hourly rounding complete. Reminded to call for assistance. Will continue to monitor.

## 2020-09-19 NOTE — ASSESSMENT & PLAN NOTE
- Pt presenting with symptoms of nausea , abdominal pain and diarrhea   -Recent CT evidence of mild colitis   -X-ray abd today resulted nonobstructive bowel gas pattern   -Get Stool for WBC, culture  -Intravenous hydration   -Antiemetics  , antispasmodic and other supportive treatment as needed    - HIDA scan on 9/17/20  resulted gallbladder ejection fraction is 23 % at 30 minutes.  Findings are concerning for chronic cholecystitis . General Surgery evaluated and does not think symptoms related to gallbladder disease.  No surgical indication.

## 2020-09-20 VITALS
WEIGHT: 148.38 LBS | HEART RATE: 72 BPM | DIASTOLIC BLOOD PRESSURE: 65 MMHG | HEIGHT: 62 IN | TEMPERATURE: 98 F | RESPIRATION RATE: 18 BRPM | SYSTOLIC BLOOD PRESSURE: 141 MMHG | BODY MASS INDEX: 27.3 KG/M2 | OXYGEN SATURATION: 96 %

## 2020-09-20 LAB
ALBUMIN SERPL BCP-MCNC: 3.5 G/DL (ref 3.5–5.2)
ANION GAP SERPL CALC-SCNC: 9 MMOL/L (ref 8–16)
BASOPHILS # BLD AUTO: 0.03 K/UL (ref 0–0.2)
BASOPHILS NFR BLD: 0.4 % (ref 0–1.9)
BUN SERPL-MCNC: 15 MG/DL (ref 8–23)
CALCIUM SERPL-MCNC: 9.6 MG/DL (ref 8.7–10.5)
CHLORIDE SERPL-SCNC: 103 MMOL/L (ref 95–110)
CO2 SERPL-SCNC: 26 MMOL/L (ref 23–29)
CREAT SERPL-MCNC: 1.6 MG/DL (ref 0.5–1.4)
DIFFERENTIAL METHOD: ABNORMAL
EOSINOPHIL # BLD AUTO: 0 K/UL (ref 0–0.5)
EOSINOPHIL NFR BLD: 0.6 % (ref 0–8)
ERYTHROCYTE [DISTWIDTH] IN BLOOD BY AUTOMATED COUNT: 16.2 % (ref 11.5–14.5)
EST. GFR  (AFRICAN AMERICAN): 36 ML/MIN/1.73 M^2
EST. GFR  (NON AFRICAN AMERICAN): 32 ML/MIN/1.73 M^2
GLUCOSE SERPL-MCNC: 94 MG/DL (ref 70–110)
HCT VFR BLD AUTO: 30.4 % (ref 37–48.5)
HGB BLD-MCNC: 8.9 G/DL (ref 12–16)
IMM GRANULOCYTES # BLD AUTO: 0.04 K/UL (ref 0–0.04)
IMM GRANULOCYTES NFR BLD AUTO: 0.6 % (ref 0–0.5)
LYMPHOCYTES # BLD AUTO: 1.3 K/UL (ref 1–4.8)
LYMPHOCYTES NFR BLD: 18.5 % (ref 18–48)
MCH RBC QN AUTO: 23.5 PG (ref 27–31)
MCHC RBC AUTO-ENTMCNC: 29.3 G/DL (ref 32–36)
MCV RBC AUTO: 80 FL (ref 82–98)
MONOCYTES # BLD AUTO: 0.7 K/UL (ref 0.3–1)
MONOCYTES NFR BLD: 10.2 % (ref 4–15)
NEUTROPHILS # BLD AUTO: 4.9 K/UL (ref 1.8–7.7)
NEUTROPHILS NFR BLD: 69.7 % (ref 38–73)
NRBC BLD-RTO: 0 /100 WBC
PHOSPHATE SERPL-MCNC: 2.6 MG/DL (ref 2.7–4.5)
PLATELET # BLD AUTO: 268 K/UL (ref 150–350)
PMV BLD AUTO: 9.7 FL (ref 9.2–12.9)
POTASSIUM SERPL-SCNC: 4.4 MMOL/L (ref 3.5–5.1)
RBC # BLD AUTO: 3.79 M/UL (ref 4–5.4)
SODIUM SERPL-SCNC: 138 MMOL/L (ref 136–145)
WBC # BLD AUTO: 6.96 K/UL (ref 3.9–12.7)

## 2020-09-20 PROCEDURE — 90471 IMMUNIZATION ADMIN: CPT | Performed by: INTERNAL MEDICINE

## 2020-09-20 PROCEDURE — 25000003 PHARM REV CODE 250: Performed by: INTERNAL MEDICINE

## 2020-09-20 PROCEDURE — 90694 VACC AIIV4 NO PRSRV 0.5ML IM: CPT | Performed by: INTERNAL MEDICINE

## 2020-09-20 PROCEDURE — 96372 THER/PROPH/DIAG INJ SC/IM: CPT

## 2020-09-20 PROCEDURE — G0008 ADMIN INFLUENZA VIRUS VAC: HCPCS | Performed by: INTERNAL MEDICINE

## 2020-09-20 PROCEDURE — 36415 COLL VENOUS BLD VENIPUNCTURE: CPT

## 2020-09-20 PROCEDURE — 97530 THERAPEUTIC ACTIVITIES: CPT

## 2020-09-20 PROCEDURE — 85025 COMPLETE CBC W/AUTO DIFF WBC: CPT

## 2020-09-20 PROCEDURE — G0378 HOSPITAL OBSERVATION PER HR: HCPCS

## 2020-09-20 PROCEDURE — 63600175 PHARM REV CODE 636 W HCPCS: Performed by: INTERNAL MEDICINE

## 2020-09-20 PROCEDURE — 97116 GAIT TRAINING THERAPY: CPT | Mod: 59

## 2020-09-20 PROCEDURE — 89125 SPECIMEN FAT STAIN: CPT

## 2020-09-20 PROCEDURE — 82656 EL-1 FECAL QUAL/SEMIQ: CPT

## 2020-09-20 PROCEDURE — 80069 RENAL FUNCTION PANEL: CPT

## 2020-09-20 RX ORDER — LOPERAMIDE HYDROCHLORIDE 2 MG/1
2 CAPSULE ORAL 3 TIMES DAILY PRN
Qty: 30 CAPSULE | Refills: 0 | Status: SHIPPED | OUTPATIENT
Start: 2020-09-20 | End: 2020-10-05

## 2020-09-20 RX ORDER — L. ACIDOPHILUS/L.BULGARICUS 100MM CELL
1 GRANULES IN PACKET (EA) ORAL DAILY
COMMUNITY
Start: 2020-09-20 | End: 2020-10-20

## 2020-09-20 RX ORDER — BUDESONIDE 3 MG/1
9 CAPSULE, COATED PELLETS ORAL DAILY
Qty: 126 CAPSULE | Refills: 0 | Status: SHIPPED | OUTPATIENT
Start: 2020-09-21 | End: 2020-11-02

## 2020-09-20 RX ORDER — DULOXETIN HYDROCHLORIDE 30 MG/1
30 CAPSULE, DELAYED RELEASE ORAL EVERY OTHER DAY
Qty: 15 CAPSULE | Refills: 2
Start: 2020-09-20 | End: 2020-12-19

## 2020-09-20 RX ADMIN — METOPROLOL TARTRATE 50 MG: 50 TABLET, FILM COATED ORAL at 08:09

## 2020-09-20 RX ADMIN — HEPARIN SODIUM 5000 UNITS: 5000 INJECTION INTRAVENOUS; SUBCUTANEOUS at 06:09

## 2020-09-20 RX ADMIN — ASPIRIN 81 MG: 81 TABLET, COATED ORAL at 08:09

## 2020-09-20 RX ADMIN — HYDROCODONE BITARTRATE AND ACETAMINOPHEN 1 TABLET: 10; 325 TABLET ORAL at 01:09

## 2020-09-20 RX ADMIN — HYDROCODONE BITARTRATE AND ACETAMINOPHEN 1 TABLET: 10; 325 TABLET ORAL at 08:09

## 2020-09-20 RX ADMIN — AMLODIPINE BESYLATE 5 MG: 5 TABLET ORAL at 08:09

## 2020-09-20 RX ADMIN — LEVOTHYROXINE SODIUM 25 MCG: 25 TABLET ORAL at 06:09

## 2020-09-20 RX ADMIN — A/SINGAPORE/GP1908/2015 IVR-180 (AN A/MICHIGAN/45/2015 (H1N1)PDM09-LIKE VIRUS, A/HONG KONG/4801/2014, NYMC X-263B (H3N2) (AN A/HONG KONG/4801/2014-LIKE VIRUS), AND B/BRISBANE/60/2008, WILD TYPE (A B/BRISBANE/60/2008-LIKE VIRUS) 0.5 ML: 15; 15; 15 INJECTION, SUSPENSION INTRAMUSCULAR at 05:09

## 2020-09-20 RX ADMIN — BUDESONIDE 9 MG: 3 CAPSULE, GELATIN COATED ORAL at 08:09

## 2020-09-20 RX ADMIN — LACTOBACILLUS ACIDOPHILUS / LACTOBACILLUS BULGARICUS 1 EACH: 100 MILLION CFU STRENGTH GRANULES at 03:09

## 2020-09-20 RX ADMIN — RANOLAZINE 500 MG: 500 TABLET, FILM COATED, EXTENDED RELEASE ORAL at 08:09

## 2020-09-20 RX ADMIN — LACTOBACILLUS ACIDOPHILUS / LACTOBACILLUS BULGARICUS 1 EACH: 100 MILLION CFU STRENGTH GRANULES at 08:09

## 2020-09-20 NOTE — PLAN OF CARE
Problem: Physical Therapy Goal  Goal: Physical Therapy Goal  Description: LTG'S TO BE MET IN 7 DAYS (9-25-20)  1. PT WILL BE GERI FOR BED MOBILITY  2. PT WILL REQUIRE CGA FOR TF'S  3. PT WILL ' WITH RW AND CGA  Outcome: Ongoing, Progressing     IMPROVED MOBILITY TODAY, SBA FOR BED MOBILITY, CGA FOR TF'S, CGA FOR GAIT WITH RW

## 2020-09-20 NOTE — PLAN OF CARE
Per Sergey with [facility], they will accept pt.       09/20/20 6499   Post-Acute Status   Post-Acute Authorization Home Health   Home Health Status Set-up Complete

## 2020-09-20 NOTE — PLAN OF CARE
Pt requesting to d/c home with [facility] as she has had them in the past.  Freedom choice form signed, original placed in chart.  Referral sent to [facility] via [facility] fax    On call nurse (Sergey) notified of referral

## 2020-09-20 NOTE — ASSESSMENT & PLAN NOTE
- Pt presenting with symptoms of nausea , abdominal pain and diarrhea   -Recent CT evidence of mild colitis   -X-ray abd today resulted nonobstructive bowel gas pattern   -Get Stool for WBC, culture  -Intravenous hydration   -Antiemetics  , antispasmodic and other supportive treatment as needed    - HIDA scan on 9/17/20  resulted gallbladder ejection fraction is 23 % at 30 minutes.  Findings are concerning for chronic cholecystitis . General Surgery evaluated and does not think symptoms related to gallbladder disease.  No surgical indication.  Recent colon biopsy showed evidence of Collagenous Colitis.  Start Budesonide 9 mg daily, Loperamide for diarrhea, and stop Pantoprazole and Duloxetine.

## 2020-09-20 NOTE — NURSING
Discharge orders reviewed with pt. Flu vaccine given. Verbalizes understanding . Copy of discharge paperwork given to pt.

## 2020-09-20 NOTE — SUBJECTIVE & OBJECTIVE
Interval History:  Tolerating small amounts of food.  Diarrhea and abdominal pain persist.  No vomiting.    Review of Systems   Constitutional: Positive for activity change and appetite change. Negative for fever.   HENT: Negative for sore throat.    Eyes: Negative for visual disturbance.   Respiratory: Negative for cough, chest tightness and shortness of breath.    Cardiovascular: Negative for chest pain, palpitations and leg swelling.   Gastrointestinal: Positive for abdominal pain (better) and nausea (better). Negative for abdominal distention, constipation, diarrhea and vomiting.   Endocrine: Negative for polyuria.   Genitourinary: Negative for decreased urine volume, dysuria, flank pain, frequency and hematuria.   Musculoskeletal: Negative for back pain and gait problem.   Skin: Negative for rash.   Neurological: Negative for syncope, speech difficulty, weakness, light-headedness and headaches.   Psychiatric/Behavioral: Negative for confusion, hallucinations and sleep disturbance.     Objective:     Vital Signs (Most Recent):  Temp: 97.8 °F (36.6 °C) (09/19/20 1926)  Pulse: 75 (09/19/20 1926)  Resp: 18 (09/19/20 1926)  BP: (!) 141/63 (09/19/20 1926)  SpO2: (!) 93 % (09/19/20 1926) Vital Signs (24h Range):  Temp:  [97.7 °F (36.5 °C)-98.2 °F (36.8 °C)] 97.8 °F (36.6 °C)  Pulse:  [55-75] 75  Resp:  [18-20] 18  SpO2:  [92 %-97 %] 93 %  BP: (100-141)/(54-67) 141/63     Weight: 65.7 kg (144 lb 13.5 oz)  Body mass index is 26.49 kg/m².    Intake/Output Summary (Last 24 hours) at 9/19/2020 2130  Last data filed at 9/19/2020 1700  Gross per 24 hour   Intake 141.67 ml   Output --   Net 141.67 ml      Physical Exam  Constitutional:       General: She is not in acute distress.     Appearance: She is well-developed. She is not diaphoretic.   HENT:      Head: Normocephalic and atraumatic.      Mouth/Throat:      Pharynx: No oropharyngeal exudate.   Eyes:      Conjunctiva/sclera: Conjunctivae normal.      Pupils: Pupils are  equal, round, and reactive to light.   Neck:      Musculoskeletal: Normal range of motion and neck supple.      Thyroid: No thyromegaly.      Vascular: No JVD.   Cardiovascular:      Rate and Rhythm: Normal rate and regular rhythm.      Heart sounds: Normal heart sounds. No murmur.   Pulmonary:      Effort: Pulmonary effort is normal. No respiratory distress.      Breath sounds: Normal breath sounds. No wheezing or rales.   Chest:      Chest wall: No tenderness.   Abdominal:      General: Bowel sounds are normal. There is no distension.      Palpations: Abdomen is soft.      Tenderness: There is abdominal tenderness (mild generalized ). There is no guarding or rebound.   Musculoskeletal: Normal range of motion.   Lymphadenopathy:      Cervical: No cervical adenopathy.   Skin:     General: Skin is warm and dry.      Findings: No rash.   Neurological:      Mental Status: She is alert and oriented to person, place, and time.      Cranial Nerves: No cranial nerve deficit.      Sensory: No sensory deficit.      Deep Tendon Reflexes: Reflexes normal.         Significant Labs:   CBC:   Recent Labs   Lab 09/18/20  0712 09/19/20  1313   WBC 4.87 5.61   HGB 9.2* 8.8*   HCT 31.5* 31.2*    262     CMP:   Recent Labs   Lab 09/18/20  0712 09/19/20  1313   * 138   K 3.7 4.2   CL 99 104   CO2 23 25    108   BUN 9 16   CREATININE 1.7* 1.9*   CALCIUM 9.0 8.9   PROT 6.5  --    ALBUMIN 3.4* 3.3*   BILITOT 0.2  --    ALKPHOS 63  --    AST 14  --    ALT 8*  --    ANIONGAP 13 9   EGFRNONAA 29* 26*       Significant Imaging:

## 2020-09-20 NOTE — PLAN OF CARE
Problem: Fall Injury Risk  Goal: Absence of Fall and Fall-Related Injury  Outcome: Ongoing, Progressing     Problem: Adult Inpatient Plan of Care  Goal: Plan of Care Review  Outcome: Ongoing, Progressing  Goal: Patient-Specific Goal (Individualization)  Outcome: Ongoing, Progressing  Goal: Absence of Hospital-Acquired Illness or Injury  Outcome: Ongoing, Progressing  Goal: Optimal Comfort and Wellbeing  Outcome: Ongoing, Progressing  Goal: Readiness for Transition of Care  Outcome: Ongoing, Progressing  Goal: Rounds/Family Conference  Outcome: Ongoing, Progressing     Problem: Diabetes Comorbidity  Goal: Blood Glucose Level Within Desired Range  Outcome: Ongoing, Progressing     Problem: Electrolyte Imbalance (Acute Kidney Injury/Impairment)  Goal: Serum Electrolyte Balance  Outcome: Ongoing, Progressing     Problem: Fluid Imbalance (Acute Kidney Injury/Impairment)  Goal: Optimal Fluid Balance  Outcome: Ongoing, Progressing     Problem: Hematologic Alteration (Acute Kidney Injury/Impairment)  Goal: Hemoglobin, Hematocrit and Platelets Within Normal Range  Outcome: Ongoing, Progressing     Problem: Oral Intake Inadequate (Acute Kidney Injury/Impairment)  Goal: Optimal Nutrition Intake  Outcome: Ongoing, Progressing     Problem: Renal Function Impairment (Acute Kidney Injury/Impairment)  Goal: Effective Renal Function  Outcome: Ongoing, Progressing     Problem: Infection  Goal: Infection Symptom Resolution  Outcome: Ongoing, Progressing     Problem: Skin Injury Risk Increased  Goal: Skin Health and Integrity  Outcome: Ongoing, Progressing

## 2020-09-20 NOTE — PLAN OF CARE
09/20/20 1439   Post-Acute Status   Post-Acute Authorization Home Health   Home Health Status Referrals Sent

## 2020-09-20 NOTE — ASSESSMENT & PLAN NOTE
Serum Iron and Ferritin very low.  Given ongoing GI symptoms ordered IV Iron Sucrose for replacement.

## 2020-09-20 NOTE — PT/OT/SLP PROGRESS
Physical Therapy  Treatment    Su Callejas   MRN: [id number]   Admitting Diagnosis: Colitis    PT Received On: 09/20/20  PT Start Time: 0835     PT Stop Time: 0900    PT Total Time (min): 25 min       Billable Minutes:  Gait Training 15 and Therapeutic Activity 10  PT/PTA: PT        General Precautions: Standard, fall  Orthopedic Precautions: N/A   Braces: N/A    Subjective:  Communicated with NURSE SCOTT prior to session.  Pain/Comfort  Pain Rating 1: 0/10(PT C/O NAUSEA, C/O NO SLEEP)    Objective:   Patient found with: telemetry, peripheral IV    Functional Mobility:  Therapeutic Activities and Exercises:  PT FOUND SUPINE IN BED UPON ARRIVAL, AGREEABLE TO TX. WITH ENCOURAGEMENT, PT C/O NO SLEEP, SUP>SIT WITH SBA, SEATED SCOOT TO EOB WITH SBA, REVIEW RW USE AND SAFETY DURING TF'S AND GAIT, SIT>STAND WITH CGA, PT AMB 60' X 2 TRIALS WITH RW AND CGA, SLOW PACE, QUICK TO FATIGUE, CUES FOR UPRIGHT POSTURE, F DYNAMIC BALANCE, PT RETURN TO ROOM BACK TO SUPINE WITH CGA, DECLINE SITTING IN CHAIR AT THIS TIME DUE TO WANTING TO GO BACK TO SLEEP, REVIEW BLE THEREX TO PERFORM WHILE SUPINE IN BED, PT ENCOURAGED TO INCREASE TIME OOB IN CHAIR    AM-PAC 6 CLICK MOBILITY  How much help from another person does this patient currently need?   1 = Unable, Total/Dependent Assistance  2 = A lot, Maximum/Moderate Assistance  3 = A little, Minimum/Contact Guard/Supervision  4 = None, Modified [place]/Independent    Turning over in bed (including adjusting bedclothes, sheets and blankets)?: 4  Sitting down on and standing up from a chair with arms (e.g., wheelchair, bedside commode, etc.): 3  Moving from lying on back to sitting on the side of the bed?: 4  Moving to and from a bed to a chair (including a wheelchair)?: 3  Need to walk in hospital room?: 3  Climbing 3-5 steps with a railing?: 3  Basic Mobility Total Score: 20    AM-PAC Raw Score CMS G-Code Modifier Level of Impairment Assistance   6 % Total / Unable   7 -  9 CM 80 - 100% Maximal Assist   10 - 14 CL 60 - 80% Moderate Assist   15 - 19 CK 40 - 60% Moderate Assist   20 - 22 CJ 20 - 40% Minimal Assist   23 CI 1-20% SBA / CGA   24 CH 0% Independent/ Mod I     Patient left supine with all lines intact, call button in reach, bed alarm on and NURSE notified.    Assessment:  Su Callejas is a 75 y.o. female with a medical diagnosis of Colitis and presents with IMPAIRED FUNCTIONAL MOBILITY. PT WILL BENEFIT FROM CONT. SKILLED P.T. TO ADDRESS IMPAIRMENTS    Rehab identified problem list/impairments: Rehab identified problem list/impairments: weakness, impaired endurance, impaired balance, gait instability, impaired functional mobilty, decreased coordination    Rehab potential is good.    Activity tolerance: Good    Discharge recommendations: Discharge Facility/Level of Care Needs: nursing facility, skilled, home health PT(DEPENDING ON PROGRESS WITH POC)     Barriers to discharge:     Equipment recommendations: Equipment Needed After Discharge: walker, rolling, bath bench, bedside commode     GOALS:   Multidisciplinary Problems     Physical Therapy Goals        Problem: Physical Therapy Goal    Goal Priority Disciplines Outcome Goal Variances Interventions   Physical Therapy Goal     PT, PT/OT Ongoing, Progressing     Description: LTG'S TO BE MET IN 7 DAYS (9-25-20)  1. PT WILL BE GERI FOR BED MOBILITY  2. PT WILL REQUIRE CGA FOR TF'S  3. PT WILL ' WITH RW AND CGA                   PLAN:    Patient to be seen 5 x/week  to address the above listed problems via gait training, therapeutic activities, therapeutic exercises  Plan of Care expires: 09/25/20  Plan of Care reviewed with: patient    Marge Curz, PT  09/20/2020

## 2020-09-20 NOTE — PROGRESS NOTES
[facility] Hospital Medicine  Progress Note    Patient Name: Su Callejas  MRN: [id number]  Patient Class: OP- Observation   Admission Date: 9/16/2020  Length of Stay: 0 days  Attending Physician: Jesse Real MD  Primary Care Provider: Rashawn Ortega DO        Subjective:     Principal Problem:Colitis        HPI:  The pt is a 75-year-old female with a PMHx of CAD (RCA stent ) , Hyperlipidemia,  Remote H/O left renal cell cancer status post nephrectomy, bladder cancer, chronic kidney disease III , bowel obstruction status post resection 2019, and chronic GERD with reported achalasia status post Heller myotomy (9/2019) and  Former Smoker  Returned to the ER for further evaluation of  4 weeks H/O  abdominal pain , nausea , decreased appetite and loose /watery stools. Pt had multiple ER visits  since early this month at [facility] and was seen at [facility] on 9/14/20 . Today pt c/o generalized  abdominal pain more in the upper abdomen or epigastric area and persistent nausea associated with 3 to 4 loose /watery stool a day. She denies vomiting to me though. Denies blood or mucous in the stool. Denies sick contact or recent travel out of the state or country . Denies fever , chills, night sweats , chest pain, SOB, diaphoresis , cough , chest congestion. Reports decreased appetite and unable to eat or take medication due to persistent nausea . CT abd/pelvis done on 9/14 resulted mild wall thickening of the ascending colon suggestive of colitis and X-ray abdomen today resulted nonobstructive bowel gas pattern . Other workup include labs were fairly unremarkable not consistent with pt's c/o 4 weeks of diarrhea. Lipase and lactic acid are normal. Serum creatinine slightly elevated from baseline and UA resulted increased Sp gravity and positive nitrite though WBC count 3 /HPF. Pt admitted for further evaluation and treatment of gastroenteritis and colitis.       Overview/Hospital Course:  9/17- no  further diarrhea since admission . Abdominal pain is better . CT abd/pelvis on 9/14 resulted gallbladder sludge . HIDA scan done this morning resulted gallbladder ejection fraction is 23 % at 30 minutes.  Findings are concerning for chronic cholecystitis . Will consult General Surgery for input. Urine culture not resulted. Continue Rocephin. Oral Flagyl added.     Interval History:  Tolerating small amounts of food.  Diarrhea and abdominal pain persist.  No vomiting.    Review of Systems   Constitutional: Positive for activity change and appetite change. Negative for fever.   HENT: Negative for sore throat.    Eyes: Negative for visual disturbance.   Respiratory: Negative for cough, chest tightness and shortness of breath.    Cardiovascular: Negative for chest pain, palpitations and leg swelling.   Gastrointestinal: Positive for abdominal pain (better) and nausea (better). Negative for abdominal distention, constipation, diarrhea and vomiting.   Endocrine: Negative for polyuria.   Genitourinary: Negative for decreased urine volume, dysuria, flank pain, frequency and hematuria.   Musculoskeletal: Negative for back pain and gait problem.   Skin: Negative for rash.   Neurological: Negative for syncope, speech difficulty, weakness, light-headedness and headaches.   Psychiatric/Behavioral: Negative for confusion, hallucinations and sleep disturbance.     Objective:     Vital Signs (Most Recent):  Temp: 97.8 °F (36.6 °C) (09/19/20 1926)  Pulse: 75 (09/19/20 1926)  Resp: 18 (09/19/20 1926)  BP: (!) 141/63 (09/19/20 1926)  SpO2: (!) 93 % (09/19/20 1926) Vital Signs (24h Range):  Temp:  [97.7 °F (36.5 °C)-98.2 °F (36.8 °C)] 97.8 °F (36.6 °C)  Pulse:  [55-75] 75  Resp:  [18-20] 18  SpO2:  [92 %-97 %] 93 %  BP: (100-141)/(54-67) 141/63     Weight: 65.7 kg (144 lb 13.5 oz)  Body mass index is 26.49 kg/m².    Intake/Output Summary (Last 24 hours) at 9/19/2020 2130  Last data filed at 9/19/2020 1700  Gross per 24 hour   Intake  141.67 ml   Output --   Net 141.67 ml      Physical Exam  Constitutional:       General: She is not in acute distress.     Appearance: She is well-developed. She is not diaphoretic.   HENT:      Head: Normocephalic and atraumatic.      Mouth/Throat:      Pharynx: No oropharyngeal exudate.   Eyes:      Conjunctiva/sclera: Conjunctivae normal.      Pupils: Pupils are equal, round, and reactive to light.   Neck:      Musculoskeletal: Normal range of motion and neck supple.      Thyroid: No thyromegaly.      Vascular: No JVD.   Cardiovascular:      Rate and Rhythm: Normal rate and regular rhythm.      Heart sounds: Normal heart sounds. No murmur.   Pulmonary:      Effort: Pulmonary effort is normal. No respiratory distress.      Breath sounds: Normal breath sounds. No wheezing or rales.   Chest:      Chest wall: No tenderness.   Abdominal:      General: Bowel sounds are normal. There is no distension.      Palpations: Abdomen is soft.      Tenderness: There is abdominal tenderness (mild generalized ). There is no guarding or rebound.   Musculoskeletal: Normal range of motion.   Lymphadenopathy:      Cervical: No cervical adenopathy.   Skin:     General: Skin is warm and dry.      Findings: No rash.   Neurological:      Mental Status: She is alert and oriented to person, place, and time.      Cranial Nerves: No cranial nerve deficit.      Sensory: No sensory deficit.      Deep Tendon Reflexes: Reflexes normal.         Significant Labs:   CBC:   Recent Labs   Lab 09/18/20  0712 09/19/20  1313   WBC 4.87 5.61   HGB 9.2* 8.8*   HCT 31.5* 31.2*    262     CMP:   Recent Labs   Lab 09/18/20  0712 09/19/20  1313   * 138   K 3.7 4.2   CL 99 104   CO2 23 25    108   BUN 9 16   CREATININE 1.7* 1.9*   CALCIUM 9.0 8.9   PROT 6.5  --    ALBUMIN 3.4* 3.3*   BILITOT 0.2  --    ALKPHOS 63  --    AST 14  --    ALT 8*  --    ANIONGAP 13 9   EGFRNONAA 29* 26*       Significant Imaging:       Assessment/Plan:      *  Colitis  - Pt presenting with symptoms of nausea , abdominal pain and diarrhea   -Recent CT evidence of mild colitis   -X-ray abd today resulted nonobstructive bowel gas pattern   -Get Stool for WBC, culture  -Intravenous hydration   -Antiemetics  , antispasmodic and other supportive treatment as needed    - HIDA scan on 9/17/20  resulted gallbladder ejection fraction is 23 % at 30 minutes.  Findings are concerning for chronic cholecystitis . General Surgery evaluated and does not think symptoms related to gallbladder disease.  No surgical indication.  Recent colon biopsy showed evidence of Collagenous Colitis.  Start Budesonide 9 mg daily, Loperamide for diarrhea, and stop Pantoprazole and Duloxetine.    Iron deficiency anemia  Serum Iron and Ferritin very low.  Given ongoing GI symptoms ordered IV Iron Sucrose for replacement.    H/O Coronary artery disease involving native coronary artery  -Resume Home meds - ASA, BB, Statin , Ranexa       UTI (urinary tract infection)  -Start Rocephin 1 gram IV daily   -Follow up urine culture       RYDER (acute kidney injury) on CKD stage III   - Baseline creatinine 1.4 to 1.6   -Intravenous hydration   -Monitor renal indices         VTE Risk Mitigation (From admission, onward)         Ordered     heparin (porcine) injection 5,000 Units  Every 8 hours      09/16/20 1907     IP VTE HIGH RISK PATIENT  Once      09/16/20 1907     Place sequential compression device  Until discontinued      09/16/20 1907                Discharge Planning   JENNIFER:      Code Status: Full Code   Is the patient medically ready for discharge?:     Reason for patient still in hospital (select all that apply): Treatment  Discharge Plan A: Home, Home with family                  Jesse Real MD  Department of Hospital Medicine   [facility]

## 2020-09-21 NOTE — PLAN OF CARE
09/21/20 0822   Final Note   Assessment Type Final Discharge Note   Anticipated Discharge Disposition Home-Health   Right Care Referral Info   Post Acute Recommendation Home-care   Facility Name [facility]

## 2020-09-21 NOTE — DISCHARGE SUMMARY
[facility]  Hospital Medicine  Discharge Summary      Patient Name: Su Callejas  MRN: [id number]  Admission Date: 9/16/2020  Hospital Length of Stay: 0 days  Discharge Date and Time: 9/20/2020  6:51 PM  Attending Physician:  Jesse Real MD  Discharging Provider: Jesse Real MD  Primary Care Provider: Rashawn Ortega DO      HPI:   The pt is a 75-year-old female with a PMHx of CAD (RCA stent ) , Hyperlipidemia,  Remote H/O left renal cell cancer status post nephrectomy, bladder cancer, chronic kidney disease III , bowel obstruction status post resection 2019, and chronic GERD with reported achalasia status post Heller myotomy (9/2019) and  Former Smoker  Returned to the ER for further evaluation of  4 weeks H/O  abdominal pain , nausea , decreased appetite and loose /watery stools. Pt had multiple ER visits  since early this month at [facility] and was seen at [facility] on 9/14/20 . Today pt c/o generalized  abdominal pain more in the upper abdomen or epigastric area and persistent nausea associated with 3 to 4 loose /watery stool a day. She denies vomiting to me though. Denies blood or mucous in the stool. Denies sick contact or recent travel out of the state or country . Denies fever , chills, night sweats , chest pain, SOB, diaphoresis , cough , chest congestion. Reports decreased appetite and unable to eat or take medication due to persistent nausea . CT abd/pelvis done on 9/14 resulted mild wall thickening of the ascending colon suggestive of colitis and X-ray abdomen today resulted nonobstructive bowel gas pattern . Other workup include labs were fairly unremarkable not consistent with pt's c/o 4 weeks of diarrhea. Lipase and lactic acid are normal. Serum creatinine slightly elevated from baseline and UA resulted increased Sp gravity and positive nitrite though WBC count 3 /HPF. Pt admitted for further evaluation and treatment of gastroenteritis and colitis.       * No surgery  found *      Hospital Course:   9/17- no further diarrhea since admission . Abdominal pain is better . CT abd/pelvis on 9/14 resulted gallbladder sludge . HIDA scan done this morning resulted gallbladder ejection fraction is 23 % at 30 minutes.  Findings are concerning for chronic cholecystitis . Will consult General Surgery for input. Urine culture not resulted. Continue Rocephin. Oral Flagyl added.  She had additional episodes of abdominal pain and diarrhea. Stool studies were negative for any signs of infectious process. Review her of her recent colonoscopy showed evidence of microscopic colitis. Started daily course of budesonide. Her symptoms improved marginally but persisted. Pain and diarrhea were better controlled. Stopped antibiotics and collected stool sample for fecal fat and elastics. Discharge plan to return home with home health. Follow-up with own gastroenterologist. Complete a course of budesonide.     Consults:   Consults (From admission, onward)        Status Ordering Provider     Inpatient consult to Social Work  Once     Provider:  (Not yet assigned)    Completed ANNA HERRON          No new Assessment & Plan notes have been filed under this hospital service since the last note was generated.  Service: Hospital Medicine    Final Active Diagnoses:    Diagnosis Date Noted POA    PRINCIPAL PROBLEM:  Colitis [K52.9] 09/16/2020 Yes    Iron deficiency anemia [D50.9] 09/19/2020 Yes    Moderate malnutrition [E44.0] 09/18/2020 Unknown    Biliary dyskinesia [K82.8] 09/17/2020 Yes    RYDER (acute kidney injury) on CKD stage III  [N17.9] 09/16/2020 Yes    UTI (urinary tract infection) [N39.0] 09/16/2020 Yes    H/O Coronary artery disease involving native coronary artery [I25.10] 09/16/2020 Yes      Problems Resolved During this Admission:       Discharged Condition: good    Disposition: Home-Health Care [facility]    Follow Up:  Follow-up Information     W Regulo Fritz MD In 1 week.    Specialty:  Gastroenterology  Why: Hospital follow up chronic diarrhea - Collagenous colitis.  Ongoing evaluation.  Stool studies pending for pancreatic insufficiency.  Contact information:  [address] [place] [id number] [phone]                 Patient Instructions:      Diet Cardiac       Significant Diagnostic Studies: Labs: All labs within the past 24 hours have been reviewed    Pending Diagnostic Studies:     Procedure Component Value Units Date/Time    Fecal fat, qualitative [[id number]] Collected: 09/20/20 1725    Order Status: Sent Lab Status: In process Updated: 09/20/20 1810    Specimen: Stool     Pancreatic elastase, fecal [[id number]] Collected: 09/20/20 1725    Order Status: Sent Lab Status: In process Updated: 09/20/20 1810    Specimen: Stool          Medications:  Reconciled Home Medications:      Medication List      START taking these medications    budesonide 3 mg capsule  Commonly known as: ENTOCORT EC  Take 3 capsules (9 mg total) by mouth once daily.  Start taking on: September 21, 2020     lactobacillus acidophilus & bulgar 100 million cell packet  Commonly known as: LACTINEX  Take 1 packet (1 each total) by mouth once daily.     loperamide 2 mg capsule  Commonly known as: IMODIUM  Take 1 capsule (2 mg total) by mouth 3 (three) times daily as needed for Diarrhea.        CHANGE how you take these medications    DULoxetine 30 MG capsule  Commonly known as: CYMBALTA  Take 1 capsule (30 mg total) by mouth every other day.  What changed: when to take this        CONTINUE taking these medications    acetaminophen 500 MG tablet  Commonly known as: TYLENOL  Take 1,000 mg by mouth.     albuterol 90 mcg/actuation inhaler  Commonly known as: PROVENTIL/VENTOLIN HFA  Inhale 2 puffs into the lungs every 4 (four) hours as needed.     aspirin 81 MG EC tablet  Commonly known as: ECOTRIN  Take 81 mg by mouth once daily.     benzonatate 200 MG capsule  Commonly known as: TESSALON     DEXILANT 60 mg  capsule  Generic drug: dexlansoprazole  Take 60 mg by mouth.     dicyclomine 20 mg tablet  Commonly known as: BENTYL     ergocalciferol 50,000 unit Cap  Commonly known as: ERGOCALCIFEROL  Take 50,000 Units by mouth.     HYDROcodone-acetaminophen  mg per tablet  Commonly known as: NORCO  Take 1 tablet by mouth every 6 (six) hours as needed for Pain.     levothyroxine 25 MCG tablet  Commonly known as: SYNTHROID  Take 25 mcg by mouth once daily.     MELATONIN ORAL  Take by mouth.     metoprolol tartrate 50 MG tablet  Commonly known as: LOPRESSOR  Take 50 mg by mouth 2 (two) times daily.     nitroGLYCERIN 0.3 MG SL tablet  Commonly known as: NITROSTAT  Place 0.3 mg under the tongue every 5 (five) minutes as needed for Chest pain.     ondansetron 4 MG tablet  Commonly known as: ZOFRAN     pregabalin 100 MG capsule  Commonly known as: LYRICA  Take 100 mg by mouth.     promethazine 25 MG tablet  Commonly known as: PHENERGAN  Take 1 tablet (25 mg total) by mouth every 6 (six) hours as needed for Nausea.     ranolazine 500 MG Tb12  Commonly known as: RANEXA  Take 500 mg by mouth 2 (two) times daily.     simvastatin 20 MG tablet  Commonly known as: ZOCOR  Take 20 mg by mouth every evening.     tiZANidine 2 MG tablet  Commonly known as: ZANAFLEX  Take 2 mg by mouth every 8 (eight) hours as needed.     traMADoL 50 mg tablet  Commonly known as: ULTRAM     vitamin A 8000 UNIT capsule  Take 8,000 Units by mouth.     VITAMIN D ORAL  Take by mouth.        STOP taking these medications    amoxicillin-clavulanate 500-125mg 500-125 mg Tab  Commonly known as: AUGMENTIN     ciprofloxacin HCl 500 MG tablet  Commonly known as: CIPRO     doxycycline 100 MG tablet  Commonly known as: VIBRA-TABS     esomeprazole 20 MG capsule  Commonly known as: NEXIUM     fish oil-omega-3 fatty acids 300-1,000 mg capsule     metroNIDAZOLE 500 MG tablet  Commonly known as: FLAGYL     oxyCODONE 15 MG Tab  Commonly known as: ROXICODONE     pantoprazole 40  MG tablet  Commonly known as: PROTONIX     predniSONE 20 MG tablet  Commonly known as: DELTASONE            Indwelling Lines/Drains at time of discharge:   Lines/Drains/Airways     None                 Time spent on the discharge of patient: 30 minutes  Patient was seen and examined on the date of discharge and determined to be suitable for discharge.         Jesse Real MD  Department of Hospital Medicine  [facility]

## 2020-09-22 ENCOUNTER — HOSPITAL ENCOUNTER (EMERGENCY)
Facility: HOSPITAL | Age: 75
Discharge: HOME | End: 2020-09-22
Attending: EMERGENCY MEDICINE
Payer: MEDICARE

## 2020-09-22 VITALS
BODY MASS INDEX: 25.99 KG/M2 | TEMPERATURE: 99 F | DIASTOLIC BLOOD PRESSURE: 71 MMHG | HEART RATE: 62 BPM | SYSTOLIC BLOOD PRESSURE: 129 MMHG | RESPIRATION RATE: 18 BRPM | OXYGEN SATURATION: 97 % | WEIGHT: 142.13 LBS

## 2020-09-22 DIAGNOSIS — R10.9 ABDOMINAL PAIN: ICD-10-CM

## 2020-09-22 DIAGNOSIS — R11.2 NON-INTRACTABLE VOMITING WITH NAUSEA, UNSPECIFIED VOMITING TYPE: Primary | ICD-10-CM

## 2020-09-22 LAB
ALBUMIN SERPL BCP-MCNC: 3.9 G/DL (ref 3.5–5.2)
ALP SERPL-CCNC: 57 U/L (ref 55–135)
ALT SERPL W/O P-5'-P-CCNC: 16 U/L (ref 10–44)
ANION GAP SERPL CALC-SCNC: 12 MMOL/L (ref 8–16)
AST SERPL-CCNC: 19 U/L (ref 10–40)
BASOPHILS # BLD AUTO: 0.07 K/UL (ref 0–0.2)
BASOPHILS NFR BLD: 0.8 % (ref 0–1.9)
BILIRUB SERPL-MCNC: 0.3 MG/DL (ref 0.1–1)
BILIRUB UR QL STRIP: NEGATIVE
BUN SERPL-MCNC: 18 MG/DL (ref 8–23)
CALCIUM SERPL-MCNC: 9.8 MG/DL (ref 8.7–10.5)
CHLORIDE SERPL-SCNC: 98 MMOL/L (ref 95–110)
CLARITY UR: CLEAR
CO2 SERPL-SCNC: 27 MMOL/L (ref 23–29)
COLOR UR: YELLOW
CREAT SERPL-MCNC: 1.5 MG/DL (ref 0.5–1.4)
DIFFERENTIAL METHOD: ABNORMAL
EOSINOPHIL # BLD AUTO: 0.2 K/UL (ref 0–0.5)
EOSINOPHIL NFR BLD: 2.4 % (ref 0–8)
ERYTHROCYTE [DISTWIDTH] IN BLOOD BY AUTOMATED COUNT: 17.2 % (ref 11.5–14.5)
EST. GFR  (AFRICAN AMERICAN): 39 ML/MIN/1.73 M^2
EST. GFR  (NON AFRICAN AMERICAN): 34 ML/MIN/1.73 M^2
FAT STL SUDAN IV STN: NORMAL
GLUCOSE SERPL-MCNC: 120 MG/DL (ref 70–110)
GLUCOSE UR QL STRIP: NEGATIVE
HCT VFR BLD AUTO: 31.5 % (ref 37–48.5)
HCV AB SERPL QL IA: NEGATIVE
HGB BLD-MCNC: 9.3 G/DL (ref 12–16)
HGB UR QL STRIP: NEGATIVE
IMM GRANULOCYTES # BLD AUTO: 0.07 K/UL (ref 0–0.04)
IMM GRANULOCYTES NFR BLD AUTO: 0.8 % (ref 0–0.5)
KETONES UR QL STRIP: NEGATIVE
LEUKOCYTE ESTERASE UR QL STRIP: NEGATIVE
LIPASE SERPL-CCNC: 16 U/L (ref 4–60)
LYMPHOCYTES # BLD AUTO: 1.8 K/UL (ref 1–4.8)
LYMPHOCYTES NFR BLD: 20.9 % (ref 18–48)
MCH RBC QN AUTO: 23.7 PG (ref 27–31)
MCHC RBC AUTO-ENTMCNC: 29.5 G/DL (ref 32–36)
MCV RBC AUTO: 80 FL (ref 82–98)
MONOCYTES # BLD AUTO: 0.7 K/UL (ref 0.3–1)
MONOCYTES NFR BLD: 8 % (ref 4–15)
NEUTROPHILS # BLD AUTO: 5.8 K/UL (ref 1.8–7.7)
NEUTROPHILS NFR BLD: 67.1 % (ref 38–73)
NITRITE UR QL STRIP: NEGATIVE
NRBC BLD-RTO: 0 /100 WBC
PH UR STRIP: 8 [PH] (ref 5–8)
PLATELET # BLD AUTO: 286 K/UL (ref 150–350)
PMV BLD AUTO: 9.6 FL (ref 9.2–12.9)
POTASSIUM SERPL-SCNC: 3.9 MMOL/L (ref 3.5–5.1)
PROT SERPL-MCNC: 7.2 G/DL (ref 6–8.4)
PROT UR QL STRIP: NEGATIVE
RBC # BLD AUTO: 3.92 M/UL (ref 4–5.4)
SODIUM SERPL-SCNC: 137 MMOL/L (ref 136–145)
SP GR UR STRIP: 1.02 (ref 1–1.03)
URN SPEC COLLECT METH UR: NORMAL
UROBILINOGEN UR STRIP-ACNC: NEGATIVE EU/DL
WBC # BLD AUTO: 8.63 K/UL (ref 3.9–12.7)

## 2020-09-22 PROCEDURE — 80053 COMPREHEN METABOLIC PANEL: CPT

## 2020-09-22 PROCEDURE — 96375 TX/PRO/DX INJ NEW DRUG ADDON: CPT

## 2020-09-22 PROCEDURE — 36415 COLL VENOUS BLD VENIPUNCTURE: CPT

## 2020-09-22 PROCEDURE — 81003 URINALYSIS AUTO W/O SCOPE: CPT

## 2020-09-22 PROCEDURE — 86803 HEPATITIS C AB TEST: CPT

## 2020-09-22 PROCEDURE — 83690 ASSAY OF LIPASE: CPT

## 2020-09-22 PROCEDURE — 85025 COMPLETE CBC W/AUTO DIFF WBC: CPT

## 2020-09-22 PROCEDURE — 99284 EMERGENCY DEPT VISIT MOD MDM: CPT | Mod: 25

## 2020-09-22 PROCEDURE — S0028 INJECTION, FAMOTIDINE, 20 MG: HCPCS | Performed by: EMERGENCY MEDICINE

## 2020-09-22 PROCEDURE — 63600175 PHARM REV CODE 636 W HCPCS: Performed by: EMERGENCY MEDICINE

## 2020-09-22 PROCEDURE — 25000003 PHARM REV CODE 250: Performed by: EMERGENCY MEDICINE

## 2020-09-22 PROCEDURE — 96374 THER/PROPH/DIAG INJ IV PUSH: CPT

## 2020-09-22 RX ORDER — FAMOTIDINE 10 MG/ML
20 INJECTION INTRAVENOUS
Status: COMPLETED | OUTPATIENT
Start: 2020-09-22 | End: 2020-09-22

## 2020-09-22 RX ORDER — METOCLOPRAMIDE 10 MG/1
10 TABLET ORAL EVERY 6 HOURS
Qty: 120 TABLET | Refills: 0 | Status: SHIPPED | OUTPATIENT
Start: 2020-09-22 | End: 2020-10-22

## 2020-09-22 RX ORDER — METOCLOPRAMIDE HYDROCHLORIDE 5 MG/ML
10 INJECTION INTRAMUSCULAR; INTRAVENOUS
Status: COMPLETED | OUTPATIENT
Start: 2020-09-22 | End: 2020-09-22

## 2020-09-22 RX ORDER — DICYCLOMINE HYDROCHLORIDE 20 MG/1
20 TABLET ORAL 3 TIMES DAILY
Qty: 15 TABLET | Refills: 0 | Status: SHIPPED | OUTPATIENT
Start: 2020-09-22 | End: 2020-09-27

## 2020-09-22 RX ORDER — OXYMETAZOLINE HCL 0.05 %
2 SPRAY, NON-AEROSOL (ML) NASAL
Status: COMPLETED | OUTPATIENT
Start: 2020-09-22 | End: 2020-09-22

## 2020-09-22 RX ORDER — HYDROCODONE BITARTRATE AND ACETAMINOPHEN 10; 325 MG/1; MG/1
1 TABLET ORAL EVERY 6 HOURS PRN
Qty: 9 TABLET | Refills: 0 | Status: SHIPPED | OUTPATIENT
Start: 2020-09-22

## 2020-09-22 RX ORDER — MORPHINE SULFATE 4 MG/ML
4 INJECTION, SOLUTION INTRAMUSCULAR; INTRAVENOUS
Status: COMPLETED | OUTPATIENT
Start: 2020-09-22 | End: 2020-09-22

## 2020-09-22 RX ORDER — FAMOTIDINE 20 MG/1
20 TABLET, FILM COATED ORAL 2 TIMES DAILY
Qty: 60 TABLET | Refills: 0 | Status: SHIPPED | OUTPATIENT
Start: 2020-09-22 | End: 2020-10-22

## 2020-09-22 RX ADMIN — Medication 2 SPRAY: at 12:09

## 2020-09-22 RX ADMIN — FAMOTIDINE 20 MG: 10 INJECTION INTRAVENOUS at 01:09

## 2020-09-22 RX ADMIN — METOCLOPRAMIDE 10 MG: 5 INJECTION, SOLUTION INTRAMUSCULAR; INTRAVENOUS at 01:09

## 2020-09-22 RX ADMIN — MORPHINE SULFATE 4 MG: 4 INJECTION, SOLUTION INTRAMUSCULAR; INTRAVENOUS at 01:09

## 2020-09-22 RX ADMIN — SODIUM CHLORIDE 1000 ML: 0.9 INJECTION, SOLUTION INTRAVENOUS at 01:09

## 2020-09-22 NOTE — DISCHARGE INSTRUCTIONS
Continue home medications.  Change her Reglan dosing to every 6 hr on a schedule.  Use Pepcid twice daily as well as Bentyl for cramps.  Drink plenty of fluids and eat a bland diet and advance slowly as tolerated.  I highly recommend you follow-up with Gastroenterology.  Please call your gastroenterologist or our gastroenterologist for re-evaluation.  Return as needed for any worsening symptoms, problems, questions or concerns.

## 2020-09-22 NOTE — ED NOTES
"DC indicated per MD. DC instructions explained to pt. And pt's daughter, both of whom verbalized understanding. NAD, VSS, resp. E/u. AAOx4. Pt. Verbalizes frustration that she is "In the ED every other day." Pt. States that she "Has already been to them" when encouraged to follow-up with gastro specialist. RN explained in detail the benefits of follow-up with specialist(s) for continuity of care and progressive treatment of illness/discomfort - pt. Verbalized understanding. Pt. To lobby and then to POV via WC without incident; escorted by LYNSEY Jackson tech. Copy of DC instructions provided to pt. By registration team member with checkout.   "

## 2020-09-22 NOTE — ED NOTES
In and out catheter preformed using sterile technique to obtain urine. Urine specimen sent to lab.

## 2020-09-22 NOTE — ED PROVIDER NOTES
"SCRIBE #1 NOTE: I, Jim Conde, am scribing for, and in the presence of, Duke Yates Jr., MD. I have scribed the entire note.      History      Chief Complaint   Patient presents with    Abdominal Pain     seen here for same complaint multiple times recently; also c/o nausea       Review of patient's allergies indicates:   Allergen Reactions    Sulfa (sulfonamide antibiotics) Anaphylaxis     Throat closing    Ondansetron hcl (pf) Itching    Contrast media      IV contrast    Iodinated contrast media     Promethazine Other (See Comments)     Patient states that it causes a "restless feeling in legs and arms."        HPI   HPI    9/22/2020, 12:12 PM   History obtained from the patient      History of Present Illness: Su Callejas is a 75 y.o. female patient who presents to the Emergency Department for generalized abdominal pain, onset several weeks PTA. Pt presented to the ED on 9/16/20 for similar sxs, and was admitted. Pt was discharged 2 days ago. Symptoms are constant and moderate in severity. No mitigating or exacerbating factors reported. Associated sxs include n/v/d and lower back pain. Patient denies any fever, chills, SOB, CP, weakness, numbness, dizziness, headache, and all other sxs at this time. No further complaints or concerns at this time.     Arrival mode: EMS    PCP: Rashawn Ortega DO       Past Medical History:  Past Medical History:   Diagnosis Date    Bladder cancer     CAD (coronary artery disease)     Cervical disc disease     CHF (congestive heart failure)     Diabetes mellitus     Fibromyalgia     History of kidney cancer     Hyperlipidemia     Hypertension     Lumbar disc disease     Lupus     Malignant neoplasm of urinary bladder     Small bowel obstruction     Uncomplicated opioid dependence        Past Surgical History:  Past Surgical History:   Procedure Laterality Date    CARDIAC CATHETERIZATION      COLON SURGERY      Bowel resection early 2018.    " LAPAROSCOPIC ROBOT-ASSISTED SURGICAL REMOVAL OF KIDNEY USING DA IRMA XI Left          Family History:  No family history on file.    Social History:  Social History     Tobacco Use    Smoking status: Former Smoker     Years: 25.00     Types: Cigarettes    Smokeless tobacco: Never Used   Substance and Sexual Activity    Alcohol use: No    Drug use: No    Sexual activity: Not Currently       ROS   Review of Systems   Constitutional: Negative for chills and fever.   HENT: Negative for sore throat.    Respiratory: Negative for shortness of breath.    Cardiovascular: Negative for chest pain.   Gastrointestinal: Positive for abdominal pain (generalized), diarrhea, nausea and vomiting.   Genitourinary: Negative for dysuria.   Musculoskeletal: Positive for back pain (lower).   Skin: Negative for rash.   Neurological: Negative for dizziness, seizures, weakness, light-headedness, numbness and headaches.   Hematological: Does not bruise/bleed easily.   All other systems reviewed and are negative.    Physical Exam      Initial Vitals [09/22/20 1152]   BP Pulse Resp Temp SpO2   (!) 140/70 70 16 98.5 °F (36.9 °C) 98 %      MAP       --          Physical Exam  Nursing Notes and Vital Signs Reviewed.  Constitutional: Patient is in moderate distress. Well-developed and well-nourished.  Head: Atraumatic. Normocephalic.  Eyes:  EOM intact. Conjunctivae are not pale. No scleral icterus.  ENT: Mucous membranes are moist. Oropharynx is clear and symmetric.    Neck: Supple. Full ROM. No lymphadenopathy.  Cardiovascular: Regular rate. Regular rhythm. No murmurs, rubs, or gallops. Distal pulses are 2+ and symmetric.  Pulmonary/Chest: No respiratory distress. Clear to auscultation bilaterally. No wheezing or rales.  Abdominal: Soft and non-distended.  There is no tenderness.  No rebound, guarding, or rigidity. Good bowel sounds.  Genitourinary: No CVA tenderness.  No suprapubic tenderness  Musculoskeletal: Moves all extremities. No  obvious deformities. No edema.  Skin: Warm and dry.  Neurological:  Alert, awake, and appropriate.  Normal speech.  No acute focal neurological deficits are appreciated.  Psychiatric: Normal affect. Good eye contact. Appropriate in content.    ED Course    Procedures  ED Vital Signs:  Vitals:    09/22/20 1152 09/22/20 1213 09/22/20 1222 09/22/20 1245   BP: (!) 140/70  131/65    Pulse: 70 74 68    Resp: 16  (!) 21    Temp: 98.5 °F (36.9 °C)      TempSrc: Oral      SpO2: 98%  96%    Weight:    64.5 kg (142 lb 1.6 oz)    09/22/20 1308 09/22/20 1332 09/22/20 1402   BP: (!) 155/68 (!) 152/69 (!) 179/81   Pulse: 70 (!) 58 (!) 57   Resp: (!) 26 14 (!) 24   Temp:      TempSrc:      SpO2: (!) 94% 95% 96%   Weight:          Abnormal Lab Results:  Labs Reviewed   COMPREHENSIVE METABOLIC PANEL - Abnormal; Notable for the following components:       Result Value    Glucose 120 (*)     Creatinine 1.5 (*)     eGFR if  39 (*)     eGFR if non  34 (*)     All other components within normal limits   CBC W/ AUTO DIFFERENTIAL - Abnormal; Notable for the following components:    RBC 3.92 (*)     Hemoglobin 9.3 (*)     Hematocrit 31.5 (*)     Mean Corpuscular Volume 80 (*)     Mean Corpuscular Hemoglobin 23.7 (*)     Mean Corpuscular Hemoglobin Conc 29.5 (*)     RDW 17.2 (*)     Immature Granulocytes 0.8 (*)     Immature Grans (Abs) 0.07 (*)     All other components within normal limits   HEPATITIS C ANTIBODY   LIPASE   URINALYSIS, REFLEX TO URINE CULTURE    Narrative:     Specimen Source->Urine        All Lab Results:  Results for orders placed or performed during the hospital encounter of 09/22/20   Hepatitis C antibody   Result Value Ref Range    Hepatitis C Ab Negative Negative   Comprehensive metabolic panel   Result Value Ref Range    Sodium 137 136 - 145 mmol/L    Potassium 3.9 3.5 - 5.1 mmol/L    Chloride 98 95 - 110 mmol/L    CO2 27 23 - 29 mmol/L    Glucose 120 (H) 70 - 110 mg/dL    BUN, Bld 18 8  - 23 mg/dL    Creatinine 1.5 (H) 0.5 - 1.4 mg/dL    Calcium 9.8 8.7 - 10.5 mg/dL    Total Protein 7.2 6.0 - 8.4 g/dL    Albumin 3.9 3.5 - 5.2 g/dL    Total Bilirubin 0.3 0.1 - 1.0 mg/dL    Alkaline Phosphatase 57 55 - 135 U/L    AST 19 10 - 40 U/L    ALT 16 10 - 44 U/L    Anion Gap 12 8 - 16 mmol/L    eGFR if African American 39 (A) >60 mL/min/1.73 m^2    eGFR if non African American 34 (A) >60 mL/min/1.73 m^2   Lipase   Result Value Ref Range    Lipase 16 4 - 60 U/L   CBC auto differential   Result Value Ref Range    WBC 8.63 3.90 - 12.70 K/uL    RBC 3.92 (L) 4.00 - 5.40 M/uL    Hemoglobin 9.3 (L) 12.0 - 16.0 g/dL    Hematocrit 31.5 (L) 37.0 - 48.5 %    Mean Corpuscular Volume 80 (L) 82 - 98 fL    Mean Corpuscular Hemoglobin 23.7 (L) 27.0 - 31.0 pg    Mean Corpuscular Hemoglobin Conc 29.5 (L) 32.0 - 36.0 g/dL    RDW 17.2 (H) 11.5 - 14.5 %    Platelets 286 150 - 350 K/uL    MPV 9.6 9.2 - 12.9 fL    Immature Granulocytes 0.8 (H) 0.0 - 0.5 %    Gran # (ANC) 5.8 1.8 - 7.7 K/uL    Immature Grans (Abs) 0.07 (H) 0.00 - 0.04 K/uL    Lymph # 1.8 1.0 - 4.8 K/uL    Mono # 0.7 0.3 - 1.0 K/uL    Eos # 0.2 0.0 - 0.5 K/uL    Baso # 0.07 0.00 - 0.20 K/uL    nRBC 0 0 /100 WBC    Gran% 67.1 38.0 - 73.0 %    Lymph% 20.9 18.0 - 48.0 %    Mono% 8.0 4.0 - 15.0 %    Eosinophil% 2.4 0.0 - 8.0 %    Basophil% 0.8 0.0 - 1.9 %    Differential Method Automated    Urinalysis, Reflex to Urine Culture Urine, Clean Catch    Specimen: Urine   Result Value Ref Range    Specimen UA Urine, Catheterized     Color, UA Yellow Yellow, Straw, Rena    Appearance, UA Clear Clear    pH, UA 8.0 5.0 - 8.0    Specific Gravity, UA 1.020 1.005 - 1.030    Protein, UA Negative Negative    Glucose, UA Negative Negative    Ketones, UA Negative Negative    Bilirubin (UA) Negative Negative    Occult Blood UA Negative Negative    Nitrite, UA Negative Negative    Urobilinogen, UA Negative <2.0 EU/dL    Leukocytes, UA Negative Negative     Imaging Results:  Imaging  Results          X-Ray Abdomen AP 1 View (KUB) (Final result)  Result time 09/22/20 12:45:36    Final result by Vinh Garcia MD (09/22/20 12:45:36)                 Impression:      Postsurgical changes.  Nonspecific abdominal gas pattern.  No obstruction or free air.      Electronically signed by: Vinh Garcia MD  Date:    09/22/2020  Time:    12:45             Narrative:    EXAMINATION:  XR ABDOMEN AP 1 VIEW    CLINICAL HISTORY:  Unspecified abdominal pain    TECHNIQUE:  Single AP View of the abdomen was performed.    COMPARISON:  None    FINDINGS:  Nonspecific abdominal gas pattern present.  No evidence of obstruction or free air.  Multiple surgical clips overlying the abdominal region.                                        The Emergency Provider reviewed the vital signs and test results, which are outlined above.    ED Discussion     2:39 PM: Discussed pt's case with Christina Echols NP (Hospital Medicine) who recommends having the pt continue her prescribed steroids, and discharging the pt home on Reglan and pain medication. Per Hospital Medicine, pt needs to take scheduled Reglan (every 6-8 hours), not PRN.    2:40 PM: Reassessed pt at this time. Pt has passed her PO challenge. Discussed with pt all pertinent ED information and results. Discussed pt dx and plan of tx. Gave pt all f/u and return to the ED instructions. All questions and concerns were addressed at this time. Pt expresses understanding of information and instructions, and is comfortable with plan to discharge. Pt is stable for discharge.    I discussed with patient and/or family/caretaker that evaluation in the ED does not suggest any emergent or life threatening medical conditions requiring immediate intervention beyond what was provided in the ED, and I believe patient is safe for discharge.  Regardless, an unremarkable evaluation in the ED does not preclude the development or presence of a serious of life threatening condition. As such,  patient was instructed to return immediately for any worsening or change in current symptoms.    2:50 PM  Patient is stable and nontoxic.  Patient has passed a p.o. challenge.  The patient has had these recurrent episodes of nausea and vomiting going on for several months.  She has been evaluated by multiple specialties is head endoscopy from both above and below per her report.  Does not have a diagnosis at this time.  The patient responded well to Reglan, I discussed the case with hospital medicine who recommended discharge home on scheduled Reglan as well as been till.  Discussed this with the patient.  She is stable nontoxic and does not meet admission criteria as she is tolerating p.o..  Have a highly advised follow-up with GI for further evaluation and treatment.       ED Medication(s):  Medications   sodium chloride 0.9% bolus 1,000 mL (1,000 mLs Intravenous New Bag 9/22/20 1308)   morphine injection 4 mg (4 mg Intravenous Given 9/22/20 1308)   metoclopramide HCl injection 10 mg (10 mg Intravenous Given 9/22/20 1312)   famotidine (PF) injection 20 mg (20 mg Intravenous Given 9/22/20 1312)   oxymetazoline 0.05 % nasal spray 2 spray (2 sprays Each Nostril Given 9/22/20 1242)          New Prescriptions    No medications on file         Medical Decision Making    Medical Decision Making:   Clinical Tests:   Lab Tests: Ordered and Reviewed  Radiological Study: Ordered and Reviewed           Scribe Attestation:   Scribe #1: I performed the above scribed service and the documentation accurately describes the services I performed. I attest to the accuracy of the note.    Attending:   Physician Attestation Statement for Scribe #1: I, Duke Yates Jr., MD, personally performed the services described in this documentation, as scribed by Jim Conde, in my presence, and it is both accurate and complete.          Clinical Impression       ICD-10-CM ICD-9-CM   1. Abdominal pain  R10.9 789.00       Disposition:    Disposition: Discharged  Condition: Stable         Duke Yates Jr., MD  09/22/20 7029

## 2020-09-22 NOTE — ED NOTES
"Armband checked, patient verified - Verified by spelling and stated name on armband along with . Pt. C/o diffuse abdominal pain with radiation to lower back. Pt. C/o diarrhea "for weeks and weeks." Pt. Writhing around on stretcher, appearing to be in pain. Daughter at BS.    "

## 2020-09-22 NOTE — ED NOTES
Two attempts have been made by this RN to initiate IV access without success. Pt. States that ultrasound was used recently for IV insertion. MYRON La to attempt IV access with ultrasound.

## 2020-09-23 LAB
ELASTASE 1, FECAL: 92 MCG/G
HPV HR 12 DNA SPEC QL NAA+PROBE: NEGATIVE
HPV16 AG SPEC QL: NEGATIVE
HPV18 DNA SPEC QL NAA+PROBE: NEGATIVE

## 2020-09-25 NOTE — PROGRESS NOTES
Subjective:       Patient ID: Su Callejas is a 75 y.o. female.    Chief Complaint:  Ovarian Cyst      History of Present Illness  HPI  74yo  brought in by her son for right ovarian cyst noted incidentally on CT 20. Pt has intractable n/v & c/o constant abdominal pain. Son reports she has been in the ER multiple times & has seen multiple doctors of different specialties but no one has been able to resolve or diagnose her. Pt is alert & can answer questions but for the most part is moaning & writhing in bed c/o pain. No vaginal bleeding    GYN & OB History  No LMP recorded (lmp unknown). Patient is postmenopausal.   Date of Last Pap: No result found    OB History    Para Term  AB Living   4 2 2   2 2   SAB TAB Ectopic Multiple Live Births   2       2      # Outcome Date GA Lbr Jl/2nd Weight Sex Delivery Anes PTL Lv   4 Term     F CS-Unspec EPI  MADELEINE   3 Term     M CS-Unspec EPI  MADELEINE   2 SAB            1 SAB                Review of Systems  Review of Systems   All other systems reviewed and are negative.      Objective:     Physical Exam  Constitutional:       Appearance: Normal appearance.   Pulmonary:      Effort: Pulmonary effort is normal.   Abdominal:      General: Abdomen is flat. There is no distension.      Palpations: Abdomen is soft. There is no mass.      Tenderness: There is abdominal tenderness. There is no rebound.      Comments: Pt c/o diffuse generalized tenderness   Genitourinary:     General: Normal vulva.      Vagina: No vaginal discharge.      Comments: Atrophic vaginal mucosa, normal cervix & limited bimanual (pt is not able to stay still). No masses or abnormal scarring. Bladder base nontender. Normal anal verge. No blood noted  Skin:     General: Skin is warm and dry.   Neurological:      General: No focal deficit present.      Mental Status: She is alert.   Psychiatric:         Mood and Affect: Mood normal.         Behavior: Behavior normal.           Assessment:        1. Intractable vomiting with nausea, unspecified vomiting type    2. Encounter for cervical Pap smear with pelvic exam         Plan:      1. Discussed likely benign ovarian cyst w/ son, not likely the source of such significant & protracted pain   2. Phenergan 25 mg today  3. Due to severe pain & limitations of treatment in a clinic setting, I recommend return to ER. Son requests return by ambulance.

## 2020-09-27 LAB
BACTERIA STL CULT: NORMAL
BACTERIA STL CULT: NORMAL

## 2020-10-01 LAB
FINAL PATHOLOGIC DIAGNOSIS: NORMAL
Lab: NORMAL

## 2020-10-03 NOTE — PROGRESS NOTES
Your pap smear & HPV testing are both negative. I recommend a pap smear in 5 years & yearly gyn exam.

## 2020-10-09 ENCOUNTER — EXTERNAL HOME HEALTH (OUTPATIENT)
Dept: HOME HEALTH SERVICES | Facility: HOSPITAL | Age: 75
End: 2020-10-09

## 2020-10-16 ENCOUNTER — OFFICE VISIT (OUTPATIENT)
Dept: GASTROENTEROLOGY | Facility: CLINIC | Age: 75
End: 2020-10-16
Payer: MEDICARE

## 2020-10-16 ENCOUNTER — TELEPHONE (OUTPATIENT)
Dept: SPEECH THERAPY | Facility: HOSPITAL | Age: 75
End: 2020-10-16

## 2020-10-16 ENCOUNTER — TELEPHONE (OUTPATIENT)
Dept: GASTROENTEROLOGY | Facility: CLINIC | Age: 75
End: 2020-10-16

## 2020-10-16 VITALS
HEART RATE: 74 BPM | BODY MASS INDEX: 25.28 KG/M2 | DIASTOLIC BLOOD PRESSURE: 67 MMHG | HEIGHT: 62 IN | WEIGHT: 137.38 LBS | SYSTOLIC BLOOD PRESSURE: 122 MMHG

## 2020-10-16 DIAGNOSIS — R68.81 EARLY SATIETY: ICD-10-CM

## 2020-10-16 DIAGNOSIS — R14.0 BLOATING: ICD-10-CM

## 2020-10-16 DIAGNOSIS — K22.0 ACHALASIA: ICD-10-CM

## 2020-10-16 DIAGNOSIS — R11.2 NAUSEA AND VOMITING, INTRACTABILITY OF VOMITING NOT SPECIFIED, UNSPECIFIED VOMITING TYPE: ICD-10-CM

## 2020-10-16 DIAGNOSIS — T17.908A ASPIRATION INTO AIRWAY, INITIAL ENCOUNTER: ICD-10-CM

## 2020-10-16 DIAGNOSIS — K21.9 GASTROESOPHAGEAL REFLUX DISEASE, UNSPECIFIED WHETHER ESOPHAGITIS PRESENT: ICD-10-CM

## 2020-10-16 DIAGNOSIS — R13.11 DYSPHAGIA, ORAL PHASE: ICD-10-CM

## 2020-10-16 DIAGNOSIS — R13.10 DYSPHAGIA, UNSPECIFIED TYPE: Primary | ICD-10-CM

## 2020-10-16 DIAGNOSIS — R19.7 DIARRHEA, UNSPECIFIED TYPE: ICD-10-CM

## 2020-10-16 DIAGNOSIS — R07.89 NON-CARDIAC CHEST PAIN: ICD-10-CM

## 2020-10-16 PROCEDURE — 99999 PR PBB SHADOW E&M-EST. PATIENT-LVL V: ICD-10-PCS | Mod: PBBFAC,,, | Performed by: INTERNAL MEDICINE

## 2020-10-16 PROCEDURE — 99205 PR OFFICE/OUTPT VISIT, NEW, LEVL V, 60-74 MIN: ICD-10-PCS | Mod: S$GLB,,, | Performed by: INTERNAL MEDICINE

## 2020-10-16 PROCEDURE — 99999 PR PBB SHADOW E&M-EST. PATIENT-LVL V: CPT | Mod: PBBFAC,,, | Performed by: INTERNAL MEDICINE

## 2020-10-16 PROCEDURE — 3008F PR BODY MASS INDEX (BMI) DOCUMENTED: ICD-10-PCS | Mod: CPTII,S$GLB,, | Performed by: INTERNAL MEDICINE

## 2020-10-16 PROCEDURE — 1100F PTFALLS ASSESS-DOCD GE2>/YR: CPT | Mod: CPTII,S$GLB,, | Performed by: INTERNAL MEDICINE

## 2020-10-16 PROCEDURE — 1159F PR MEDICATION LIST DOCUMENTED IN MEDICAL RECORD: ICD-10-PCS | Mod: S$GLB,,, | Performed by: INTERNAL MEDICINE

## 2020-10-16 PROCEDURE — 1100F PR PT FALLS ASSESS DOC 2+ FALLS/FALL W/INJURY/YR: ICD-10-PCS | Mod: CPTII,S$GLB,, | Performed by: INTERNAL MEDICINE

## 2020-10-16 PROCEDURE — 1159F MED LIST DOCD IN RCRD: CPT | Mod: S$GLB,,, | Performed by: INTERNAL MEDICINE

## 2020-10-16 PROCEDURE — 3288F FALL RISK ASSESSMENT DOCD: CPT | Mod: CPTII,S$GLB,, | Performed by: INTERNAL MEDICINE

## 2020-10-16 PROCEDURE — 3008F BODY MASS INDEX DOCD: CPT | Mod: CPTII,S$GLB,, | Performed by: INTERNAL MEDICINE

## 2020-10-16 PROCEDURE — 3288F PR FALLS RISK ASSESSMENT DOCUMENTED: ICD-10-PCS | Mod: CPTII,S$GLB,, | Performed by: INTERNAL MEDICINE

## 2020-10-16 PROCEDURE — 99205 OFFICE O/P NEW HI 60 MIN: CPT | Mod: S$GLB,,, | Performed by: INTERNAL MEDICINE

## 2020-10-16 NOTE — LETTER
October 16, 2020      Isela Abraham MD  [address]  [facility] [address]           [facility]  [address]  [facility] [address]  Phone: [phone]  Fax: [phone]          Patient: Su Callejas   MR Number: [id number]   YOB: 1945   Date of Visit: 10/16/2020       Dear Dr. Isela Abraham:    Thank you for referring Su Callejas to me for evaluation. Attached you will find relevant portions of my assessment and plan of care.    If you have questions, please do not hesitate to call me. I look forward to following Su Callejas along with you.    Sincerely,    Barb Tillman MD    Enclosure  CC:  No Recipients    If you would like to receive this communication electronically, please contact [email or web address] or [phone] to request more information on [email or web address] Link access.    For providers and/or their staff who would like to refer a patient to [facility], please contact us through our one-stop-shop provider referral line, [facility], at [phone].    If you feel you have received this communication in error or would no longer like to receive these types of communications, please e-mail [email or web address]

## 2020-10-16 NOTE — PROGRESS NOTES
[facility] Consultation Note    Reason for Consult:    Chief Complaint   Patient presents with    Heartburn    Chest Pain    Dysphagia    Emesis    Nausea    Abdominal Pain    Diarrhea    Rectal Bleeding    Weight Loss         PCP:   Rashawn Ortega   [address] / [place] [address]    Referring MD:  Isela Abraham Md  [address] [place] [id number]     GI: Dr. Hank Leonardo???    Surg: Dr. Francy Oliveira MD (fellow)/Dr. Isaiah Sparks (attending)     HPI:  Su Callejas is a 75 y.o. female referred to motility clinic for second opinion regarding the following problems:    GERD.  Patient reports bothersome heartburn  Retrosternal pyrosis: daily and at night   Regurgitation: daily     MEDS:   Nexium stopped working   dexilant ? Daily - no longer taking    pepcid 20mg daily - not taking now - restarting  carafate liquid QID       Dysphagia.  Reports difficulty swallowing.  Problems with solids: yes  Problems with liquids: yes  Choking while eating: yes     Coughing wile eating: yes  Frequency:  Almost every meal  Location: lower esophagus  Onset of symptoms:2015, worse over time,   History of food impactions requiring ED visit: no  History of allergies/eczema. no    Achalasia Eckardt Score  Dysphagia  (none (0), occasional (1), daily (2), with every meal (3)): 3  Regurgitation (none (0), occasional (1), daily (2), with every meal (3)): 3   Chest pain (none (0), occasional (1), daily (2), several times per day (3)): 3   Weight loss (kg) (0 (0), <5 (1), 5-10 (2), >10 (3)): 3   Total Eckardt Score(the final score is the sum of four components (0-12)): 12/12     Aspiration PNA.    2-3 episodes  Last 2 months ago     Chest pain. Reports bothersome chest pain and spasms of esophagus.   Onset: few years ago   Frequency: alsmot daily   Triggers (cold fluids, caffeine, smoking, ETOH): eating     Negative cardiac workup: Negative cardiac shaver  during hospitalizations. Seen by cardiology - Dr. Sadia Liz for pre op clearance    CAD w stents 2012    Heller Myotomy  2019 with Dr. Sparks and Dr. Mk Oliveira at [facility] - felt better for 2-3 months, than symptoms got worse   PT thinks she had possible 2nd Heller Myotomy 8/2020 - with Dr. Sparks    Multiple dilations without improvement      Nausea.  Daily   Early satiety.  Occasional    Vomiting.  Few per week, usually after eating      MEDs:   zofran prn     Abd pain. In the past.  Now better     Bloating     Diarrhea  [place]   BM 1-2   Nocturnal  - occasional   FI - occasional     Collagenous colitis on colonoscopy.   On peptobismuth   Managed by Dr. Mckinney     Weight loss.   20lb in the past few month    Does not want     Pain .  Previously on narcotics for a long time, got off about a year ago   On Norco prn started in Sep 2020 when pain severe     Anemia.  2 PRBC during recent hospitalization.     Daughter with Carter Syndrome     Denies constipation, BRBPR, melena.       Total visit time was 70 minutes, more than 50% of which was spent in face-to-face counseling with patient regarding symptoms, diagnostic results, prognosis, risks and benefits of treatment options, instructions for management, importance of compliance with chosen treatment options and coping strategies.      REVIEW OF PREVIOUS NOTES:   Referral for POEM procedure/achalasia  Note 08/27/2000 20:75-year-old female with history of achalasia status post Heller myotomy 08/2019 and GERD.  Admitted August 2020 had another EGD with dilation no improvement in dysphagia with dilation.  Also a of duodenal adenoma with high-grade dysplasia.  Last:  Colon 2001/2002 with minimal chronic inflammation by biopsy but normal colon.  Lower abdominal pain radiating to the back since August 2020.  On Bentyl and prednisone did not help.  Diarrhea his recent abdomen now resolved.  Plan.   1. Achalasia type 3.  No benefit from Heller myotomy discussed  PEG tube and possibly considering ref to [place].  Will schedule with Dr. Tillman.  This is causing her recurrent aspiration pneumonia.  2. Lower abdominal pain, status post small bowel resection.  Ultram p.r.n..  3.  Duodenal adenoma needs removal.  4.  Reflux continue Dexilant  Rx:  EGD/colonoscopy.  It under consult IV mg p.r.n..  08/15/2019:  Robotic assisted Heller myotomy.  Intra-abdominal adhesions.  Successful Heller myotomy of hypertrophied circular muscles of GE junction.      Progress Notes  - documented in this encounter  Francy Alvarado MD - 09/30/2019 2:00 PM CDT  Formatting of this note might be different from the original.  Patient ID: Su Callejas is a 74 y.o. female.    Subjective   Ms. Callejas is a pleasant 74yF presenting for follow up after Robotic Heller Myotomy for achalasia. She was seen this morning by her Rheumatologist for cough and difficulty breathing, who prescribed antibiotics for presumed pneumonia. She is primarily c/o cough today, though she does report one episode of nausea after eating a large meal. She states that after leaving the hospital she had little appetite and did not eat much for several days. She then felt quite hungry and ate a large meal at a seafood restaurant, which resulted in some nausea later that evening. She says she has otherwise had no trouble eating or swallowing though she tends to eat large meals at once. She is accompanied by her sister in law who confirms these habits.     9/9/19: Ms. Callejas returns today with new complaints of reflux as well as generally feeling fatigued. She says she still feels food getting caught in her throat and has severe reflux and some nausea. She does not feel like eating much but does report that foods like bread and meat are the most difficult for her to eat. She has an appointment with her PCP tomorrow to f/u the cxr findings and has completed her abx prescribed last week for pneumonia. She denies f/c/v/d. She  is with her daughter today who says that once she feels better she overexerts herself and then feels fatigued.     9/30/19: Since her last visit Ms. Callejas was hospitalized for intractable nausea and vomiting with chest pain due to her reflux. She was treated symptomatically with IVF and PPI's. She also had another UGI to evaluate the flow of contrast through the lower esophagus which demonstrated brisk flow without evidence of obstruction. She was eventually discharged home with instructions to take her Nexium and Carafate. Today she feels well and says she is a new person. She has been able to eat regular meals without difficulty. She does have reflux still, particularly after large meals and at night time. She is taking her Nexium routinely as well as Carafate once daily. She has no further respiratory symptoms and has quit smoking and vaping. Overall she feels well and has been regaining her strength and function every day.     Vomiting   Associated symptoms include arthralgias. Pertinent negatives include no abdominal pain, chest pain, chills, coughing, diarrhea, fever or myalgias.     Previous Studies:   09/17/2020 HIDA:  Cystic and common bile ducts are patent.  Delayed excretion through the common bile duct with distended gallbladder and common bile duct.  Recommend correlation with LFTs to determine need for MRCP or ERCP.  Gallbladder ejection fraction 23% at 30 min concerning for cholecystitis.   CT renal stone 09/14/2020:  Mild thickening of ascending colon.  Could patient's symptoms be related to colitis.  No surrounding inflammatory changes.  Negative for acute process in the abdomen or pelvis.  Negative for right renal stone disease or hydronephrosis.  Negative for free air.  Atelectasis.  Moderate distal esophageal thickening and dilation concerning for esophagitis.  3 cm right ovarian cyst.  Gallbladder sludge.  Numerous stable findings including prior left nephrectomy as well as postoperative  changes to the transverse colon.  CT abdomen 08/31/2020:  No acute findings.  EGD 09/08/2020:  Small ulceration at GE junction.  Dilated empirically to 20 mm.  Small hiatal hernia.  3-5 mm polyp distal to the ampulla removed with cold snare and cold biopsy.  3 cc of Lynn ink injected.  Colonoscopy 09/08/2020:  Cecum not visualized.  Ascending colon not visualized.  Transverse colon normal with partial brand noted in mucosa.  Descending colon scattered tics.  Sigmoid colon scattered tics.  Rectum normal.  Left colon and sigmoid colon biopsied.  PATH 9/9/2020:  1.  D2 polypectomy colon tubular adenoma, there is no evidence of high-grade dysplasia or malignancy.  2.  GE junction biopsy:  Ulcerated squamous mucosa with exudate.  3. Transverse colon:  Features consistent with collagenous colitis  EGD 08/10/2020:  Esophagus normal to Z-line at 35 cm.  Small hiatal hernia noted on retroflexion.  Stomach normal.  Duodenum 6 mm nodule in 2nd portion of duodenum biopsied.  It Savary dilation 51 [place].  It the entire esophagus was dilated successfully and repeat EGD did not reveal any mucosal injury from dilation.  Past:  None ampullary adenoma with focal high-grade d  Esophagram 03/23/2020:  Normal esophageal peristalsis.  No esophageal mass, filling defects, mucosal ulceration.  Moderate cricopharyngeal achalasia.  Moderate hiatal hernia.  Moderate GERD.  Mild delay of transit of barium pill at the GE junction.  Manometry 08/13/2019:  Impression achalasia type 3.  LES is elevated at 17.  Esophageal peristalsis is marked but no normal peristalsis and majority of swallows with premature contractions.  Given imaging Yuriy EG junction of obstruction..  IRP 17.6. 90% premature contractions.  Distal latency 4.1.  DCI 6171 40% incomplete bolus clearance.  CT abdomen pelvis without IV contrast 08/09/2020:  No acute findings.  PATH 08/03/2019:  Gastric biopsy:  Mild volvulus are changes consistent with reactive gastropathy.    CT  abdomen and 08/11/2018:  Multiple dilated loops of small bowel in the expected location of jejunum and ileum.  Mild inflammatory changes adjacent to the dilated loops of small bowel.  This is characteristic of enteritis or small-bowel obstruction.  Common bile duct is dilated.  Diameter 14 mm.  Extravasation no reflux demonstrated.  Esophagram 09/11/2019:  Small focal outpouching of contrast along the left lateral aspect of GE junction unchanged from August.  No contrast extravasation.  Swallow mechanism appears normal.  Tertiary contractions seen in the esophagus.  GE junction is patent.  Small focal outpouching of contrast at the level of GE junction laterally similar to the previous study of August.  No contrast  CT abdomen pelvis without IV contrast 08/18/2019:  Small amount of soft tissue gas in the anterior abdominal wall this is likely postsurgical in etiology.  Otherwise negative CT.    Esophagram 08/17/2019:  Focal outpouching along the left aspect of distal esophagus at the GE junction may represent a tiny contained perforation no clarisa contrast extravasation.  Contrast enters the stomach without obstruction.    Esophagram 08/02/2019:  Prominent cricopharyngeus muscle.  Lower esophageal dysmotility.  Smooth tapered narrowing of the distal esophagus.  No esophageal ulcers or mucosal mass noted.  There is small smooth tapered narrowing of the distal esophagus live lumen measuring 7 mm.  However contrast flows readily into the stomach without significant stasis.  No esophageal ulcer or mass.  No evidence of hiatal hernia.  No GERD.  No evidence of gastric outlet obstruction.  Ultrasound 07/28/2019:  Negative.  CT 08/03/2018:  Multiple loops of dilated small bowel in the upper abdomen with possible transition in the right mid abdomen.  Findings suggestive of partial small-bowel obstruction.  Patient status post left nephrectomy.  Evidence of prior bowel surgery.    Relevant Surgical History:    08/15/2019:   Robotic assisted Heller myotomy.  Wrap not performed due to severe dysphagia.    ROS:  ROS   Constitutional: No fevers, no chills, no night sweats, no weight loss  ENT: No congestion, no rhinorrhea, no chronic sinus problems  CV: No chest pain, no palpitations  Pulm: No cough, no shortness of breath  Ophtho: + blurry vision, no eye redness  GI: see HPI  Derm: No rash  Heme: No lymphadenopathy, no bruising  MSK: + arthritis, no joint swelling, + Raynauds  : No dysuria, no frequent urination, no blood in urine  Endo: No hot or cold intolerance  Neuro: No dizziness, no syncope, no seizure  Psych: No anxiety, no depression, insomnia +      Medical History:   Past Medical History:   Diagnosis Date    Bladder cancer     CAD (coronary artery disease)     Cervical disc disease     CHF (congestive heart failure)     Diabetes mellitus     Fibromyalgia     History of kidney cancer     Hyperlipidemia     Hypertension     Lumbar disc disease     Lupus     Malignant neoplasm of urinary bladder     Small bowel obstruction     Uncomplicated opioid dependence         Surgical History:   Past Surgical History:   Procedure Laterality Date    CARDIAC CATHETERIZATION      COLON SURGERY      Bowel resection early 2018.    LAPAROSCOPIC RIGHT COLON RESECTION  2018    LAPAROSCOPIC ROBOT-ASSISTED SURGICAL REMOVAL OF KIDNEY USING DA IRMA XI Left     ROBOT-ASSISTED HELLER MYOTOMY  10/2019    ROBOT-ASSISTED HELLER MYOTOMY  07/2020    UPPER GASTROINTESTINAL ENDOSCOPY          Family History:   Family History   Problem Relation Age of Onset    Colon cancer Father 70    Colon polyps Father     Colon cancer Daughter     Colon polyps Daughter 52    Celiac disease Neg Hx     Crohn's disease Neg Hx     Cystic fibrosis Neg Hx     Cirrhosis Neg Hx     Esophageal cancer Neg Hx     Hemochromatosis Neg Hx     Inflammatory bowel disease Neg Hx     Irritable bowel syndrome Neg Hx     Liver disease Neg Hx     Liver cancer  "Neg Hx     Rectal cancer Neg Hx     Stomach cancer Neg Hx     Bernardo's disease Neg Hx     Ulcerative colitis Neg Hx     Lymphoma Neg Hx     Tuberculosis Neg Hx     Scleroderma Neg Hx     Multiple sclerosis Neg Hx     Lupus Neg Hx     Psoriasis Neg Hx     Skin cancer Neg Hx     Melanoma Neg Hx     Rheum arthritis Neg Hx         Social History:   Social History     Socioeconomic History    Marital status:      Spouse name: Not on file    Number of children: Not on file    Years of education: Not on file    Highest education level: Not on file   Occupational History    Not on file   Social Needs    Financial resource strain: Not on file    Food insecurity     Worry: Not on file     Inability: Not on file    Transportation needs     Medical: Not on file     Non-medical: Not on file   Tobacco Use    Smoking status: Former Smoker     Years: 25.00     Types: Cigarettes    Smokeless tobacco: Never Used   Substance and Sexual Activity    Alcohol use: No    Drug use: No    Sexual activity: Not Currently   Lifestyle    Physical activity     Days per week: Not on file     Minutes per session: Not on file    Stress: Not on file   Relationships    Social connections     Talks on phone: Not on file     Gets together: Not on file     Attends Caodaism service: Not on file     Active member of club or organization: Not on file     Attends meetings of clubs or organizations: Not on file     Relationship status: Not on file   Other Topics Concern    Not on file   Social History Narrative    Not on file        Review of patient's allergies indicates:   Allergen Reactions    Sulfa (sulfonamide antibiotics) Anaphylaxis     Throat closing    Ondansetron hcl (pf) Itching    Contrast media      IV contrast    Iodinated contrast media     Promethazine Other (See Comments)     Patient states that it causes a "restless feeling in legs and arms."       Current Outpatient Medications   Medication Sig " Dispense Refill    acetaminophen (TYLENOL) 500 MG tablet Take 1,000 mg by mouth.      albuterol (PROVENTIL/VENTOLIN HFA) 90 mcg/actuation inhaler Inhale 2 puffs into the lungs every 4 (four) hours as needed.      aspirin (ECOTRIN) 81 MG EC tablet Take 81 mg by mouth once daily.      dicyclomine (BENTYL) 20 mg tablet       ergocalciferol, vitamin D2, (VITAMIN D ORAL) Take by mouth.      famotidine (PEPCID) 20 MG tablet Take 1 tablet (20 mg total) by mouth 2 (two) times daily. 60 tablet 0    HYDROcodone-acetaminophen (NORCO)  mg per tablet Take 1 tablet by mouth every 6 (six) hours as needed. 9 tablet 0    levothyroxine (SYNTHROID) 25 MCG tablet Take 25 mcg by mouth once daily.      MELATONIN ORAL Take by mouth.      metoprolol tartrate (LOPRESSOR) 50 MG tablet Take 50 mg by mouth 2 (two) times daily.      nitroGLYCERIN (NITROSTAT) 0.3 MG SL tablet Place 0.3 mg under the tongue every 5 (five) minutes as needed for Chest pain.      ondansetron (ZOFRAN) 4 MG tablet       pregabalin (LYRICA) 100 MG capsule Take 100 mg by mouth.      simvastatin (ZOCOR) 20 MG tablet Take 20 mg by mouth every evening.      tiZANidine (ZANAFLEX) 2 MG tablet Take 2 mg by mouth every 8 (eight) hours as needed.      benzonatate (TESSALON) 200 MG capsule       budesonide (ENTOCORT EC) 3 mg capsule Take 3 capsules (9 mg total) by mouth once daily. (Patient not taking: Reported on 10/16/2020) 126 capsule 0    dexlansoprazole (DEXILANT) 60 mg capsule Take 60 mg by mouth.      DULoxetine (CYMBALTA) 30 MG capsule Take 1 capsule (30 mg total) by mouth every other day. (Patient not taking: Reported on 10/16/2020) 15 capsule 2    ergocalciferol (ERGOCALCIFEROL) 50,000 unit Cap Take 50,000 Units by mouth.      HYDROcodone-acetaminophen (NORCO)  mg per tablet Take 1 tablet by mouth every 6 (six) hours as needed for Pain. 10 tablet 0    lactobacillus acidophilus & bulgar (LACTINEX) 100 million cell packet Take 1 packet (1  "each total) by mouth once daily. (Patient not taking: Reported on 10/16/2020)      metoclopramide HCl (REGLAN) 10 MG tablet Take 1 tablet (10 mg total) by mouth every 6 (six) hours. (Patient not taking: Reported on 10/16/2020) 120 tablet 0    promethazine (PHENERGAN) 25 MG tablet Take 1 tablet (25 mg total) by mouth every 6 (six) hours as needed for Nausea. 15 tablet 0    ranolazine (RANEXA) 500 MG Tb12 Take 500 mg by mouth 2 (two) times daily.      traMADoL (ULTRAM) 50 mg tablet       vitamin A 8000 UNIT capsule Take 8,000 Units by mouth.       No current facility-administered medications for this visit.         Objective Findings:  Vital Signs:  /67   Pulse 74   Ht 5' 2" (1.575 m)   Wt 62.3 kg (137 lb 5.6 oz)   LMP  (LMP Unknown)   BMI 25.12 kg/m²   Body mass index is 25.12 kg/m².    Physical Exam:  General appearance: alert, cooperative, no distress  HENT: Normocephalic, atraumatic, neck symmetrical, no nasal discharge  Eyes: conjunctivae/corneas clear, PERRL, EOM's intact  Lungs: clear to auscultation bilaterally, no dullness to percussion bilaterally  Heart: regular rate and rhythm without rub; no displacement of the PMI  Abdomen: soft, non-tender; bowel sounds normoactive; no organomegaly  Extremities: extremities symmetric; no clubbing, cyanosis, or edema  Integument: Skin color, texture, turgor normal; no rashes; hair distrubution normal  Neurologic: Alert and oriented X 3, normal strength, normal coordination and gait  Psychiatric: no pressured speech; normal affect; no evidence of impaired cognition    Labs:   Reviewed in Epic      Assessment and Plan:  Su Callejas is a 75 y.o. female with past medical history of coronary artery disease status post stents in 2012 on aspirin, arthritis, bladder cancer, fibromyalgia, hypertension, hyperlipidemia, collagenous colitis, colon or small bowel resection in 2018 for possible obstruction, arthritis, degenerative disc disease, diabetes status " post Heller myotomy in August of 2019 presenting to motility Clinic for 2nd opinion regarding the following problems:    GERD. Pyrosis and regurgitation   Nexium stopped working   No longer on dexilant ? Dose  Restarting pepcid 20mg daily  On carafate liquid QID     Non cardiac chest pain and spasms of esophagus w po intake.  CAD w stents 2012  Negative cardiac workup per pt and daughter. Seen by cardiology - Dr. Sadia Liz for pre op clearance   -peppermint     Dysphagia to solids and liquids at level of lower esophagus.  Regurgitation. Chest pain.  Eckardt Score 12/12  Symptoms present for years, worse in the past few years   Diagnosed with achalasia in 2019.  Esophagram 08/02/2019:  Prominent cricopharyngeus muscle.  Smooth tapered narrowing of the distal esophagus measuring 7 mm.  However contrast flows readily into the stomach without significant stasis.  No evidence of hiatal hernia.    S/p Heller Myotomy 8/15/2019.  Symptoms improved for few months, got worse 3-4 months post OP  Esophagram 08/17/2019 w focal outpouching along the left aspect of distal esophagus at the GE junction may represent a tiny contained perforation, contrast enters the stomach without obstruction.    Esophagram 03/23/2020:  Normal esophageal peristalsis. Moderate hiatal hernia.  Moderate GERD.  Mild delay of transit of barium pill at the GE junction.  No improvement with Savary dilation 8/10/2020  No improvement with EGD dialtion 20mmHg 9/8/2020  -EGD w E/G/D bx, endoFlip, dilation  -Endoscopically placed manometry    -TBS and MBS  -peppermint    Moderate cricopharyngeal achalasia on esophagogram 3/23/2020  -MBS    Nausea. Early satiety. Vomiting.  zofran prn   -Will consider GES    Bloating     Diarrhea.  Collagenous colitis   On pepto   -Def management to primary GI    Weight loss.   20lb in the past few months  Does not want to eat  -Shakes TID    Chronic pain.  Fibromyalgia  Previously on narcotics for a long time, got off about  a year ago   On Norco prn started in Sep 2020 for chest pain- advised to avoid     Anemia.    S/p 2 PRBC during recent hospitalization.   -Def management to primary GI     CRC screening. Daughter with Carter Syndrome   -Def to primary GI - incomplete colon- recommended repeat in several months     Duodenal adenoma w focal HGD 8/10/2020 s/p resection 9/2020 (path w TA)  -Def management to primary GI    Abnormal HIDA w distended GB and CBD.  GB sludge   -Def management to primary GI      Thickening of asc colon on CT   -Needs repeat colonoscopy   -Def management to primary GI    SBO in 2018 s/p SB/R Colon resection    Depression.     Chronic cough. Respiratory problerms.   History of multiple episodes of aspiration pneumoniae attributed to achalasia. Last 2 months ago   Ho smoking and vaping    PT is a poor historian, difficult to clarify clinical history and objectify symptoms.  DAughter helped w history but mark not live in the area    PT plans to move to [place] in a month (1.5h away from [place]). Requesting expedited shaver.           RECORDS NEEDED:  Manometry report -added   Report of Heller myotomy  - added    Manometry 08/13/2019:  Impression achalasia type 3.  LES is elevated at 17.  Esophageal peristalsis is marked but no normal peristalsis and majority of swallows with premature contractions.  Given imaging Yuriy EG junction of obstruction..  IRP 17.6. 90% premature contractions.  Distal latency 4.1.  DCI 6171 40% incomplete bolus clearance.    08/15/2019:  Robotic assisted Heller myotomy.  Wrap not performed due to severe dysphagia.      Follow up in about 3 months (around 1/16/2021).    1. Dysphagia, unspecified type    2. Aspiration into airway, initial encounter    3. Dysphagia, oral phase     4. Gastroesophageal reflux disease, unspecified whether esophagitis present    5. Non-cardiac chest pain    6. Nausea and vomiting, intractability of vomiting not specified, unspecified vomiting type    7. Early satiety     8. Bloating    9. Diarrhea, unspecified type    10. Achalasia          Order summary:  Orders Placed This Encounter    FL Esophagram Complete    Fl Modified Barium Swallow Speech    SLP video swallow    Case request GI: ESOPHAGOGASTRODUODENOSCOPY (EGD)    Case request GI: MANOMETRY-ESOPHAGEAL-WITH IMPEDANCE         Thank you so much for allowing me to participate in the care of Su Paige Tillman MD

## 2020-10-16 NOTE — PATIENT INSTRUCTIONS
-Drink high protein shakes three times per day   -Complete EGD, EndoFLip, Esophagogram and manometry as soon as possible   -Start peppermint three to four times per day (altoids, peppermint tea, peppermint oil (four drops in half glass of water)

## 2020-10-16 NOTE — TELEPHONE ENCOUNTER
MOT[facility] CL[facility]IC PROCEDURE ORDERS    CLEARANCE FOR PROCEDURES:  No needed     PROCEDURES  EGD with EndoFlip   Esophageal manometry with impedance - dysphagia - endoscopically placed  Esophageal manometry with impedance - rumination    FLOOR:  2nd Floor     Reason for 2nd Floor:   End stage achalasia    PREP  Full liquid diet 3 days     Motility Studies (esophageal manometry/anorectal manometry)  Hold Narcotics x 1 days if able   Hold TCA x 1 days if able  Propofol only during sedation.  Discuss with Dr. Tillman if additional sedation needed.     ORDER OF TESTING:  Day 1: EGD/EndoFlip/Manometry  Day 2: TBS/MBS    PT plans to move to [place] in a month. Requesting expedited shaver.

## 2020-10-16 NOTE — TELEPHONE ENCOUNTER
pls fax my note to   GI: Dr. Hank Leonardo - confirm w pt that os her gi   Surg: Dr. Francy Oliveira MD

## 2020-10-16 NOTE — TELEPHONE ENCOUNTER
I spent 32 minutes on 10/16/2020 at 8:44am reviewing Su Callejas medical records prior to clinic visit on 10/16/2020     JAYLON  Referral for POEM procedure/achalasia  Note 08/27/1999 20:[age]-year-old female with history of achalasia status post Heller myotomy 08/2019 and GERD.  Admitted August 2020 had another EGD with dilation no improvement in dysphagia with dilation.  Also a of duodenal adenoma with high-grade dysplasia.  Last:  2001/2002 with minimal chronic inflammation by biopsy but normal colon.  Lower abdominal pain radiating to the back since August 2020.  On Bentyl and prednisone did not help.  Diarrhea his recent abdomen now resolved.  Plan.   1. Achalasia type 3.  No benefit from Heller myotomy discussed PEG tube and possibly considering home in [place].  Will schedule with Dr. Tillman.  This is causing her recurrent aspiration pneumonia.  2. Lower abdominal pain, status post small bowel resection.  Ultram p.r.n..  3.  Duodenal adenoma needs removal.  4.  Reflux continue Dexilant  Rx:  EGD/colonoscopy.  It under consult IV mg p.r.n..    Progress Notes  - documented in this encounter  Francy Alvarado MD - 09/30/2019 2:00 PM CDT  Formatting of this note might be different from the original.  Patient ID: Su Callejas is a 74 y.o. female.    Subjective   Ms. Callejas is a pleasant 74yF presenting for follow up after Robotic Heller Myotomy for achalasia. She was seen this morning by her Rheumatologist for cough and difficulty breathing, who prescribed antibiotics for presumed pneumonia. She is primarily c/o cough today, though she does report one episode of nausea after eating a large meal. She states that after leaving the hospital she had little appetite and did not eat much for several days. She then felt quite hungry and ate a large meal at a seafood restaurant, which resulted in some nausea later that evening. She says she has otherwise had no trouble eating or swallowing though  she tends to eat large meals at once. She is accompanied by her sister in law who confirms these habits.     9/9/19: Ms. Callejas returns today with new complaints of reflux as well as generally feeling fatigued. She says she still feels food getting caught in her throat and has severe reflux and some nausea. She does not feel like eating much but does report that foods like bread and meat are the most difficult for her to eat. She has an appointment with her PCP tomorrow to f/u the cxr findings and has completed her abx prescribed last week for pneumonia. She denies f/c/v/d. She is with her daughter today who says that once she feels better she overexerts herself and then feels fatigued.     9/30/19: Since her last visit Ms. Callejas was hospitalized for intractable nausea and vomiting with chest pain due to her reflux. She was treated symptomatically with IVF and PPI's. She also had another UGI to evaluate the flow of contrast through the lower esophagus which demonstrated brisk flow without evidence of obstruction. She was eventually discharged home with instructions to take her Nexium and Carafate. Today she feels well and says she is a new person. She has been able to eat regular meals without difficulty. She does have reflux still, particularly after large meals and at night time. She is taking her Nexium routinely as well as Carafate once daily. She has no further respiratory symptoms and has quit smoking and vaping. Overall she feels well and has been regaining her strength and function every day.     Vomiting   Associated symptoms include arthralgias. Pertinent negatives include no abdominal pain, chest pain, chills, coughing, diarrhea, fever or myalgias.   Stefano Blue was seen today for post op.    Diagnoses and all orders for this visit:    Chest pain due to GERD    Continues to improve and do well.    She will continue taking Nexium daily.   Follow up with PCP for remainder of medical  comorbidities.  Follow up with me as needed.     09/17/2020 HIDA:  Cystic and common bile ducts are patent.  Delayed excretion through the common bile duct with distended gallbladder and common bile duct.  Recommend correlation with LFTs to determine need for MRCP or ERCP.  Gallbladder ejection fraction 23% at 30 min concerning for cholecystitis.   CT renal stone 09/14/2020:  Mild thickening of ascending colon.  Could patient's symptoms be related to colitis.  No surrounding inflammatory changes.  Negative for acute process in the abdomen or pelvis.  Negative for right renal stone disease or hydronephrosis.  Negative for free air.  Atelectasis.  Moderate distal esophageal thickening and dilation concerning for esophagitis.  3 cm right ovarian cyst.  Gallbladder sludge.  Numerous stable findings including prior left nephrectomy as well as postoperative changes to the transverse colon.  CT abdomen 08/31/2020:  No acute findings.  EGD 09/08/2020:  Small ulceration at GE junction.  Dilated empirically to 20 mm.  Small hiatal hernia.  3-5 mm polyp distal to the ampulla removed with cold snare and cold biopsy.  3 cc of Lynn ink injected.  Colonoscopy 09/08/2020:  Cecum not visualized.  Ascending colon not visualized.  Transverse colon normal with partial brand noted in mucosa.  Descending colon scattered tics.  Sigmoid colon scattered tics.  Rectum normal.  Left colon and sigmoid colon biopsied.  PATH 9/9/2020:  1.  D2 polypectomy colon tubular adenoma, there is no evidence of high-grade dysplasia or malignancy.  2.  GE junction biopsy:  Ulcerated squamous mucosa with exudate.  3. Transverse colon:  Features consistent with collagenous colitis  EGD 08/10/2020:  Esophagus normal to Z-line at 35 cm.  Small hiatal hernia noted on retroflexion.  Stomach normal.  Duodenum 6 mm nodule in 2nd portion of duodenum biopsied.  It Savary dilation 51 French.  It the entire esophagus was dilated successfully and repeat EGD did not  reveal any mucosal injury from dilation.  Past:  None ampullary adenoma with focal high-grade d  Esophagram 03/23/2020:  Normal esophageal peristalsis.  No esophageal mass, filling defects, mucosal ulceration.  Moderate cricopharyngeal achalasia.  Moderate hiatal hernia.  Moderate GERD.  Mild delay of transit of barium pill at the GE junction.    CT abdomen pelvis without IV contrast 08/09/2020:  No acute findings.  PATH 08/03/2019:  Gastric biopsy:  Mild volvulus are changes consistent with reactive gastropathy.    CT abdomen and 08/11/2018:  Multiple dilated loops of small bowel in the expected location of jejunum and ileum.  Mild inflammatory changes adjacent to the dilated loops of small bowel.  This is characteristic of enteritis or small-bowel obstruction.  Common bile duct is dilated.  Diameter 14 mm.  Extravasation no reflux demonstrated.  Esophagram 09/11/2019:  Small focal outpouching of contrast along the left lateral aspect of GE junction unchanged from August.  No contrast extravasation.  Swallow mechanism appears normal.  Tertiary contractions seen in the esophagus.  GE junction is patent.  Small focal outpouching of contrast at the level of GE junction laterally similar to the previous study of August.  No contrast  CT abdomen pelvis without IV contrast 08/18/2019:  Small amount of soft tissue gas in the anterior abdominal wall this is likely postsurgical in etiology.  Otherwise negative CT.    Esophagram 08/17/2019:  Focal outpouching along the left aspect of distal esophagus at the GE junction may represent a tiny contained perforation no clarisa contrast extravasation.  Contrast enters the stomach without obstruction.    Esophagram 08/02/2019:  Prominent cricopharyngeus muscle.  Lower esophageal dysmotility.  Smooth tapered narrowing of the distal esophagus.  No esophageal ulcers or mucosal mass noted.  There is small smooth tapered narrowing of the distal esophagus live lumen measuring 7 mm.   However contrast flows readily into the stomach without significant stasis.  No esophageal ulcer or mass.  No evidence of hiatal hernia.  No GERD.  No evidence of gastric outlet obstruction.  Ultrasound 07/28/2019:  Negative.  CT 08/03/2018:  Multiple loops of dilated small bowel in the upper abdomen with possible transition in the right mid abdomen.  Findings suggestive of partial small-bowel obstruction.  Patient status post left nephrectomy.  Evidence of prior bowel surgery.

## 2020-10-21 ENCOUNTER — TELEPHONE (OUTPATIENT)
Dept: SPEECH THERAPY | Facility: HOSPITAL | Age: 75
End: 2020-10-21

## 2020-10-26 ENCOUNTER — TELEPHONE (OUTPATIENT)
Dept: ENDOSCOPY | Facility: HOSPITAL | Age: 75
End: 2020-10-26

## 2020-10-26 NOTE — TELEPHONE ENCOUNTER
Spoke with patients Daughter Lea.  We scheduled her EGD with Manometry on 12/17/20 with RAPID COVID test for 830am.    Reviewed prep instructions. Will mail instructions to [facility], [place] [address]

## 2020-11-13 ENCOUNTER — TELEPHONE (OUTPATIENT)
Dept: RADIOLOGY | Facility: HOSPITAL | Age: 75
End: 2020-11-13

## 2020-11-16 ENCOUNTER — HOSPITAL ENCOUNTER (OUTPATIENT)
Dept: RADIOLOGY | Facility: HOSPITAL | Age: 75
Discharge: HOME | End: 2020-11-16
Attending: INTERNAL MEDICINE
Payer: MEDICARE

## 2020-11-16 ENCOUNTER — CLINICAL SUPPORT (OUTPATIENT)
Dept: SPEECH THERAPY | Facility: HOSPITAL | Age: 75
End: 2020-11-16
Attending: INTERNAL MEDICINE
Payer: MEDICARE

## 2020-11-16 DIAGNOSIS — R13.10 DYSPHAGIA, UNSPECIFIED TYPE: ICD-10-CM

## 2020-11-16 DIAGNOSIS — R13.11 DYSPHAGIA, ORAL PHASE: ICD-10-CM

## 2020-11-16 DIAGNOSIS — T17.908A ASPIRATION INTO AIRWAY, INITIAL ENCOUNTER: ICD-10-CM

## 2020-11-16 DIAGNOSIS — R13.12 DYSPHAGIA, OROPHARYNGEAL: Primary | ICD-10-CM

## 2020-11-16 PROCEDURE — A9698 NON-RAD CONTRAST MATERIALNOC: HCPCS | Performed by: INTERNAL MEDICINE

## 2020-11-16 PROCEDURE — 25500020 PHARM REV CODE 255: Performed by: INTERNAL MEDICINE

## 2020-11-16 PROCEDURE — 92611 MOTION FLUOROSCOPY/SWALLOW: CPT | Mod: GN | Performed by: SPEECH-LANGUAGE PATHOLOGIST

## 2020-11-16 PROCEDURE — 74220 X-RAY XM ESOPHAGUS 1CNTRST: CPT | Mod: TC

## 2020-11-16 PROCEDURE — 74220 X-RAY XM ESOPHAGUS 1CNTRST: CPT | Mod: 26,,, | Performed by: RADIOLOGY

## 2020-11-16 PROCEDURE — 74220 FL ESOPHAGRAM COMPLETE: ICD-10-PCS | Mod: 26,,, | Performed by: RADIOLOGY

## 2020-11-16 PROCEDURE — 74230 X-RAY XM SWLNG FUNCJ C+: CPT | Mod: TC

## 2020-11-16 PROCEDURE — 74230 X-RAY XM SWLNG FUNCJ C+: CPT | Mod: 26,,, | Performed by: RADIOLOGY

## 2020-11-16 PROCEDURE — 74230 FL MODIFIED BARIUM SWALLOW SPEECH STUDY: ICD-10-PCS | Mod: 26,,, | Performed by: RADIOLOGY

## 2020-11-16 RX ADMIN — BARIUM SULFATE 80 ML: 0.81 POWDER, FOR SUSPENSION ORAL at 02:11

## 2020-11-16 RX ADMIN — BARIUM SULFATE 80 ML: 0.6 SUSPENSION ORAL at 02:11

## 2020-11-16 RX ADMIN — BARIUM SULFATE 200 ML: 0.81 POWDER, FOR SUSPENSION ORAL at 02:11

## 2020-11-16 NOTE — PLAN OF CARE
IMPRESSIONS:   This 75 y.o. woman appears to present with  1.  Oropharyngeal and cervical esophageal phases of swallowing within normal limits for thin liquids, thin and thick purees, masticated solid, and barium tablet.  2.  Globus sensation with solids (and with liquids taken after such solids), localized to the base of neck or mid-sternal area.  3.  History of LENNY with reported nighttime reflux that wakes her and sometimes rises to the nasal cavity.  Concerning for persisting risk of aspiration with these events.  4.  Complex GI and overall medical histories.      RECOMMENDATIONS/PLAN OF CARE:   It is felt that Mrs. Callejas would benefit from  1.  Continuation of her current regular consistency diet with thin liquids using the following strategies and common aspiration precautions, including, but not limited to   A.  Appropriate upright seating for all eating and drinking.   B.  Consider small meals/snacks through the day (vs regular meals)   C.  Small bites, thoroughly chewed, of dense foods.   D.  Monitoring for any signs/symptoms of aspiration (such as wet/gurgly voice that does not clear with coughing, inability to make any voice sounds, any persistent coughing with oral intake, otherwise unexplained fever, unexplained increased or new difficulty or discomfort breathing, unexplained increase in sleepiness/lethargy/significant fatigue, unexplained increase or new onset confusion or change in cognitive functioning, or any other unexplained change in health or well-being that could be related to swallowing).  2.  Continued use of reflux precautions and medical management of reflux as prescribed.  3.  Continued f/u with Dr. Tillman as directed.  4.  Repeat MBSS as needed.

## 2020-11-16 NOTE — PROGRESS NOTES
"  MODIFIED BARIUM SWALLOW STUDY    REASON FOR REFERRAL:  Su Callejas, age 75, was referred by Dr. Barb Tillman, gastroenterologist,  for a Modified Barium Swallow Study to rule out aspiration, assess her overall swallowing function, and determine safest consistencies for oral intake.  Seh was accompanied by her daughter.    Mrs. Callejas has a complicated history that includes LENNY, HH, achalasia type 3 s/p Heller myotomy (and a second Heller or some other surgery 8/2020), and aspiration pna x3.  Per Dr. Tillman's nte of 10/16/20, other pmhx includes "coronary artery disease status post stents in 2012 on aspirin, arthritis, bladder cancer, fibromyalgia, hypertension, hyperlipidemia, collagenous colitis, colon or small bowel resection in 2018 for possible obstruction, arthritis, degenerative disc disease, diabetes."     Today, Mrs. Callejas reported problems coughing and choking on liquids that "come back up" (especially when drinking after a bolus of food) and things like white bread that feel stuck in her esophagus (indicated from base of neck to mid-sternum).  When asked to think about solely how liquids go down when not otherwise eating, she stated that that has been going well of late.  She reported that she mainly has difficulty at night when she has reflux that wakes her from sleeping and even reaches her nose, resulting in sores in her nose.  She endorsed using reflux precautions likes sleeping with the head of the bed raised.    MEDICAL HISTORY:  Past Medical History:   Diagnosis Date    Bladder cancer     CAD (coronary artery disease)     Cervical disc disease     CHF (congestive heart failure)     Diabetes mellitus     Fibromyalgia     History of kidney cancer     Hyperlipidemia     Hypertension     Lumbar disc disease     Lupus     Malignant neoplasm of urinary bladder     Small bowel obstruction     Uncomplicated opioid dependence         SURGICAL HISTORY:  Past Surgical History:   Procedure " Laterality Date    CARDIAC CATHETERIZATION      COLON SURGERY      Bowel resection early 2018.    LAPAROSCOPIC RIGHT COLON RESECTION  2018    LAPAROSCOPIC ROBOT-ASSISTED SURGICAL REMOVAL OF KIDNEY USING DA IRMA XI Left     ROBOT-ASSISTED HELLER MYOTOMY  10/2019    ROBOT-ASSISTED HELLER MYOTOMY  07/2020    UPPER GASTROINTESTINAL ENDOSCOPY         SWALLOWING HISTORY:  Takes a regular consistency diet with thin liquids.  Takes pills with liquids.  Reported bread and pills get stuck in esophagus as above.    FAMILY HISTORY:  Family History   Problem Relation Age of Onset    Colon cancer Father 70    Colon polyps Father     Colon cancer Daughter     Colon polyps Daughter 52    Celiac disease Neg Hx     Crohn's disease Neg Hx     Cystic fibrosis Neg Hx     Cirrhosis Neg Hx     Esophageal cancer Neg Hx     Hemochromatosis Neg Hx     Inflammatory bowel disease Neg Hx     Irritable bowel syndrome Neg Hx     Liver disease Neg Hx     Liver cancer Neg Hx     Rectal cancer Neg Hx     Stomach cancer Neg Hx     Bernardo's disease Neg Hx     Ulcerative colitis Neg Hx     Lymphoma Neg Hx     Tuberculosis Neg Hx     Scleroderma Neg Hx     Multiple sclerosis Neg Hx     Lupus Neg Hx     Psoriasis Neg Hx     Skin cancer Neg Hx     Melanoma Neg Hx     Rheum arthritis Neg Hx         SOCIAL HISTORY:  Mrs. Callejas lives with her daughter and son-in-law in [place] at present.  She had been living in an assisted living facility owned by [facility] in , but when she became ill, her daughter brought her to live with her family in [place].      Tobacco:  Former smoker x25 years  ETOH:  No    BEHAVIOR:  Su Callejas was a very pleasant woman who had normal affect and social interaction.  She was able to fully cooperate during the study.  Results of today's assessment were considered indicative of her current levels of swallowing functioning.      HEARING:  Subjectively, within normal limits.     ORAL PERIPHERAL:    Informal examination of the oral mechanism revealed structures and functioning within functional limits for swallowing and speech purposes.  A few missing teeth.    Voice quality was within normal limits with no wet quality before, during, or after the study.      TEST FINDINGS:   Mrs. Callejas was seen in Radiology with the Radiologist for a Modified Barium Swallow Study.  She was seated on a stool for a left lateral videofluoroscopic view.   An a-p view was taken to view complaint of bread bolus lodged in esphagus.    Consistencies assessed using radiopaque barium contrast:  thin (single and continuous swallows via open cup),   thin puree (1-teaspoon boluses) via spoon,   thick puree (half-teaspoon bolus) via spoon,   solid (bite of white bread coated with barium pudding) by Mrs. Wright's hand and,   13 mm barium tablet with water.    Strategies:  None.    Phases:  Oral:  Mrs. Callejas was able to obtain liquid and strip utensils well with no loss of material from the oral cavity.  She moved boluses through the oral cavity with appropriate transit time.  There was no pooling of liquids in the mouth.  Swallow reflex was triggered within normal limits.    Pharyngeal:  Boluses moved through the pharyngeal phase with no laryngeal penetration and no aspiration and no nasal regurgitation.     - Timely initiation  - Adequate soft palate elevation  - Adequate laryngeal elevation and anterior hyoid excursion  - Adequate tongue base retraction  - Complete epiglottic inversion  - Complete laryngeal vestibular closure  - Adequate pharyngeal stripping wave  - Adequate PE segment opening.  -  No pharyngeal residue.    Cervical Esophageal:  Boluses entered the upper esophagus within normal limits.  No obstruction was noted.      Mrs. Callejas c/o alternately of feeling the bread or the pill stuck, localizing alternately to the base of her neck or the mid-sternum.    Rosenbeck 8-point Penetration-Aspiration Scale:  1 - Material  does not enter airway.      IMPRESSIONS:   This 75 y.o. woman appears to present with  1.  Oropharyngeal and cervical esophageal phases of swallowing within normal limits for thin liquids, thin and thick purees, masticated solid, and barium tablet.  2.  Globus sensation with solids (and with liquids taken after such solids), localized to the base of neck or mid-sternal area.  3.  History of LENNY with reported nighttime reflux that wakes her and sometimes rises to the nasal cavity.  Concerning for persisting risk of aspiration with these events.  4.  Complex GI and overall medical histories.      RECOMMENDATIONS/PLAN OF CARE:   It is felt that Mrs. Callejas would benefit from  1.  Continuation of her current regular consistency diet with thin liquids using the following strategies and common aspiration precautions, including, but not limited to   A.  Appropriate upright seating for all eating and drinking.   B.  Consider small meals/snacks through the day (vs regular meals)   C.  Small bites, thoroughly chewed, of dense foods.   D.  Monitoring for any signs/symptoms of aspiration (such as wet/gurgly voice that does not clear with coughing, inability to make any voice sounds, any persistent coughing with oral intake, otherwise unexplained fever, unexplained increased or new difficulty or discomfort breathing, unexplained increase in sleepiness/lethargy/significant fatigue, unexplained increase or new onset confusion or change in cognitive functioning, or any other unexplained change in health or well-being that could be related to swallowing).  2.  Continued use of reflux precautions and medical management of reflux as prescribed.  3.  Continued f/u with Dr. Tillman as directed.  4.  Repeat MBSS as needed.

## 2020-11-16 NOTE — Clinical Note
Belem, her actual swallow was great.  I suspect the aspiration has been related to her reflux based on history.  Zuleika

## 2020-11-17 ENCOUNTER — TELEPHONE (OUTPATIENT)
Dept: GASTROENTEROLOGY | Facility: CLINIC | Age: 75
End: 2020-11-17

## 2020-11-17 NOTE — TELEPHONE ENCOUNTER
----- Message from Zuleika Duran MA, CCC-SLP sent at 11/16/2020  3:47 PM CST -----  Belem, her actual swallow was great.  I suspect the aspiration has been related to her reflux based on history.Zuleika

## 2020-11-17 NOTE — TELEPHONE ENCOUNTER
Zuleika Duran MA, CCC-SLP  MD Belem Jon, her actual swallow was great.  I suspect the aspiration has been related to her reflux based on history.   Zuleika

## 2020-11-18 ENCOUNTER — TELEPHONE (OUTPATIENT)
Dept: GASTROENTEROLOGY | Facility: CLINIC | Age: 75
End: 2020-11-18

## 2020-11-18 NOTE — TELEPHONE ENCOUNTER
----- Message from Barb Tillman MD sent at 11/17/2020  6:01 PM CST -----  Please patient know that esophagram shows hiatal hernia.  Otherwise the tablet and liquid go down the esophagus okay.

## 2020-11-18 NOTE — TELEPHONE ENCOUNTER
Spoke w pt daughter , Lea. Results discussed w daughter. Instructed to move forward w procedures scheduled in December. I explained I would be giving them a call once scheduled finalized to make follow up for Jan. Pt daughter states they have an 8 hr drive and wants to know if they can do f/u same day. I explained results would not be back right then and that  has already booked through Dec.They are willing to do a phone call follow up. I informed daughter that I would get w  and get further advice on this matter.

## 2020-11-19 NOTE — TELEPHONE ENCOUNTER
We need to proceed w workup. She can do virtual visit fu ideally or telephone if unable to do virtual visit

## 2020-12-07 ENCOUNTER — TELEPHONE (OUTPATIENT)
Dept: GASTROENTEROLOGY | Facility: CLINIC | Age: 75
End: 2020-12-07

## 2020-12-07 NOTE — TELEPHONE ENCOUNTER
----- Message from Clinton Delaney sent at 12/7/2020  2:05 PM CST -----  Contact: @[phone]  Patient requesting a return call regarding the 12-17th appt, pls call to discuss concerns

## 2020-12-07 NOTE — TELEPHONE ENCOUNTER
Pt called complaining of prep with drive from [place] and  asking why she is on prep so long. Just fyi. I did tell her that these are specific instruction sent by  as to how she would like it done and that the  , Chayo just relays this as instructed when she sets you up.

## 2020-12-09 ENCOUNTER — TELEPHONE (OUTPATIENT)
Dept: GASTROENTEROLOGY | Facility: CLINIC | Age: 75
End: 2020-12-09

## 2020-12-09 NOTE — TELEPHONE ENCOUNTER
I was going to call to set pt up for follow up for Jan as they are expected to have egd and manometry on 12/17.       On 11/18-Pt daughter states they have an 8 hr drive and wants to know if they can do f/u same day. I explained results would not be back right then and that  has already booked through Dec.They are willing to do a phone call follow up. I informed daughter that I would get w  and get further advice on this matter.    In turn you replied :We need to proceed w workup. She can do virtual visit fu ideally or telephone if unable to do virtual visit.    They are in [place]. Are you still willing to do telephone call . pls advise.

## 2020-12-11 ENCOUNTER — TELEPHONE (OUTPATIENT)
Dept: ENDOSCOPY | Facility: HOSPITAL | Age: 75
End: 2020-12-11

## 2020-12-11 NOTE — TELEPHONE ENCOUNTER
Returned patients call regarding prep for procedure on 12/17.  LEFT VM confirming full liquid diet to start on Sunday 12/13- Tuesday 12/15. Starting clears on 12/16/20.  She has number to call me back with questions

## 2020-12-17 ENCOUNTER — ANESTHESIA (OUTPATIENT)
Dept: ENDOSCOPY | Facility: HOSPITAL | Age: 75
End: 2020-12-17
Payer: MEDICARE

## 2020-12-17 ENCOUNTER — ANESTHESIA EVENT (OUTPATIENT)
Dept: ENDOSCOPY | Facility: HOSPITAL | Age: 75
End: 2020-12-17
Payer: MEDICARE

## 2020-12-17 ENCOUNTER — HOSPITAL ENCOUNTER (OUTPATIENT)
Facility: HOSPITAL | Age: 75
Discharge: HOME | End: 2020-12-17
Attending: INTERNAL MEDICINE | Admitting: INTERNAL MEDICINE
Payer: MEDICARE

## 2020-12-17 VITALS
DIASTOLIC BLOOD PRESSURE: 62 MMHG | WEIGHT: 143 LBS | HEART RATE: 86 BPM | RESPIRATION RATE: 19 BRPM | BODY MASS INDEX: 26.31 KG/M2 | TEMPERATURE: 98 F | HEIGHT: 62 IN | OXYGEN SATURATION: 91 % | SYSTOLIC BLOOD PRESSURE: 112 MMHG

## 2020-12-17 DIAGNOSIS — R13.10 DYSPHAGIA, UNSPECIFIED TYPE: Primary | ICD-10-CM

## 2020-12-17 DIAGNOSIS — R13.10 DYSPHAGIA: ICD-10-CM

## 2020-12-17 LAB — SARS-COV-2 RDRP RESP QL NAA+PROBE: NEGATIVE

## 2020-12-17 PROCEDURE — 91010 PR ESOPHAGEAL MOTILITY STUDY, MA2METRY: ICD-10-PCS | Mod: 26,,, | Performed by: INTERNAL MEDICINE

## 2020-12-17 PROCEDURE — U0002 COVID-19 LAB TEST NON-CDC: HCPCS

## 2020-12-17 PROCEDURE — 63600175 PHARM REV CODE 636 W HCPCS: Performed by: NURSE ANESTHETIST, CERTIFIED REGISTERED

## 2020-12-17 PROCEDURE — 37000009 HC ANESTHESIA EA ADD 15 MINS: Performed by: INTERNAL MEDICINE

## 2020-12-17 PROCEDURE — D9220A PRA ANESTHESIA: ICD-10-PCS | Mod: ANES,,, | Performed by: ANESTHESIOLOGY

## 2020-12-17 PROCEDURE — 43239 PR EGD, FLEX, W/BIOPSY, SGL/MULTI: ICD-10-PCS | Mod: ,,, | Performed by: INTERNAL MEDICINE

## 2020-12-17 PROCEDURE — 88305 TISSUE EXAM BY PATHOLOGIST: CPT | Mod: 26,,, | Performed by: PATHOLOGY

## 2020-12-17 PROCEDURE — 25000003 PHARM REV CODE 250: Performed by: INTERNAL MEDICINE

## 2020-12-17 PROCEDURE — D9220A PRA ANESTHESIA: Mod: CRNA,,, | Performed by: NURSE ANESTHETIST, CERTIFIED REGISTERED

## 2020-12-17 PROCEDURE — 91037 ESOPH IMPED FUNCTION TEST: CPT | Performed by: INTERNAL MEDICINE

## 2020-12-17 PROCEDURE — 91010 ESOPHAGUS MOTILITY STUDY: CPT | Performed by: INTERNAL MEDICINE

## 2020-12-17 PROCEDURE — 43239 EGD BIOPSY SINGLE/MULTIPLE: CPT | Mod: ,,, | Performed by: INTERNAL MEDICINE

## 2020-12-17 PROCEDURE — 25000003 PHARM REV CODE 250: Performed by: NURSE ANESTHETIST, CERTIFIED REGISTERED

## 2020-12-17 PROCEDURE — 91040 PR ESOPH BALLOON DISTENSION TST: ICD-10-PCS | Mod: 26,,, | Performed by: INTERNAL MEDICINE

## 2020-12-17 PROCEDURE — 91040 ESOPH BALLOON DISTENSION TST: CPT | Mod: 26,,, | Performed by: INTERNAL MEDICINE

## 2020-12-17 PROCEDURE — 91037 ESOPH IMPED FUNCTION TEST: CPT | Mod: 26,,, | Performed by: INTERNAL MEDICINE

## 2020-12-17 PROCEDURE — 88305 TISSUE EXAM BY PATHOLOGIST: CPT | Performed by: PATHOLOGY

## 2020-12-17 PROCEDURE — 43239 EGD BIOPSY SINGLE/MULTIPLE: CPT | Performed by: INTERNAL MEDICINE

## 2020-12-17 PROCEDURE — 88305 TISSUE EXAM BY PATHOLOGIST: ICD-10-PCS | Mod: 26,,, | Performed by: PATHOLOGY

## 2020-12-17 PROCEDURE — 27201012 HC FORCEPS, HOT/COLD, DISP: Performed by: INTERNAL MEDICINE

## 2020-12-17 PROCEDURE — 37000008 HC ANESTHESIA 1ST 15 MINUTES: Performed by: INTERNAL MEDICINE

## 2020-12-17 PROCEDURE — 91040 ESOPH BALLOON DISTENSION TST: CPT | Performed by: INTERNAL MEDICINE

## 2020-12-17 PROCEDURE — 91037 PR GERD TST W/ NASAL IMPEDENCE ELECTROD: ICD-10-PCS | Mod: 26,,, | Performed by: INTERNAL MEDICINE

## 2020-12-17 PROCEDURE — C1726 CATH, BAL DIL, NON-VASCULAR: HCPCS | Performed by: INTERNAL MEDICINE

## 2020-12-17 PROCEDURE — D9220A PRA ANESTHESIA: ICD-10-PCS | Mod: CRNA,,, | Performed by: NURSE ANESTHETIST, CERTIFIED REGISTERED

## 2020-12-17 PROCEDURE — 91010 ESOPHAGUS MOTILITY STUDY: CPT | Mod: 26,,, | Performed by: INTERNAL MEDICINE

## 2020-12-17 PROCEDURE — D9220A PRA ANESTHESIA: Mod: ANES,,, | Performed by: ANESTHESIOLOGY

## 2020-12-17 RX ORDER — PHENYLEPHRINE HCL IN 0.9% NACL 1 MG/10 ML
SYRINGE (ML) INTRAVENOUS
Status: DISCONTINUED | OUTPATIENT
Start: 2020-12-17 | End: 2020-12-17

## 2020-12-17 RX ORDER — SODIUM CHLORIDE 0.9 % (FLUSH) 0.9 %
3 SYRINGE (ML) INJECTION
Status: DISCONTINUED | OUTPATIENT
Start: 2020-12-17 | End: 2020-12-17 | Stop reason: HOSPADM

## 2020-12-17 RX ORDER — SODIUM CHLORIDE 0.9 % (FLUSH) 0.9 %
10 SYRINGE (ML) INJECTION
Status: DISCONTINUED | OUTPATIENT
Start: 2020-12-17 | End: 2020-12-17 | Stop reason: HOSPADM

## 2020-12-17 RX ORDER — LIDOCAINE HYDROCHLORIDE 20 MG/ML
JELLY TOPICAL ONCE
Status: COMPLETED | OUTPATIENT
Start: 2020-12-17 | End: 2020-12-17

## 2020-12-17 RX ORDER — SODIUM CHLORIDE 9 MG/ML
INJECTION, SOLUTION INTRAVENOUS CONTINUOUS
Status: DISCONTINUED | OUTPATIENT
Start: 2020-12-17 | End: 2020-12-17 | Stop reason: HOSPADM

## 2020-12-17 RX ORDER — PROPOFOL 10 MG/ML
VIAL (ML) INTRAVENOUS
Status: DISCONTINUED | OUTPATIENT
Start: 2020-12-17 | End: 2020-12-17

## 2020-12-17 RX ORDER — LIDOCAINE HYDROCHLORIDE 20 MG/ML
INJECTION, SOLUTION EPIDURAL; INFILTRATION; INTRACAUDAL; PERINEURAL
Status: DISCONTINUED | OUTPATIENT
Start: 2020-12-17 | End: 2020-12-17

## 2020-12-17 RX ORDER — VASOPRESSIN 20 [USP'U]/ML
INJECTION, SOLUTION INTRAMUSCULAR; SUBCUTANEOUS
Status: DISCONTINUED | OUTPATIENT
Start: 2020-12-17 | End: 2020-12-17

## 2020-12-17 RX ORDER — PROPOFOL 10 MG/ML
VIAL (ML) INTRAVENOUS CONTINUOUS PRN
Status: DISCONTINUED | OUTPATIENT
Start: 2020-12-17 | End: 2020-12-17

## 2020-12-17 RX ADMIN — Medication 200 MCG: at 09:12

## 2020-12-17 RX ADMIN — PROPOFOL 100 MG: 10 INJECTION, EMULSION INTRAVENOUS at 09:12

## 2020-12-17 RX ADMIN — VASOPRESSIN 5 UNITS: 20 INJECTION INTRAVENOUS at 09:12

## 2020-12-17 RX ADMIN — LIDOCAINE HYDROCHLORIDE 10 ML: 20 JELLY TOPICAL at 09:12

## 2020-12-17 RX ADMIN — PROPOFOL 150 MCG/KG/MIN: 10 INJECTION, EMULSION INTRAVENOUS at 09:12

## 2020-12-17 RX ADMIN — LIDOCAINE HYDROCHLORIDE 50 MG: 20 INJECTION, SOLUTION EPIDURAL; INFILTRATION; INTRACAUDAL at 09:12

## 2020-12-17 RX ADMIN — SODIUM CHLORIDE: 0.9 INJECTION, SOLUTION INTRAVENOUS at 09:12

## 2020-12-17 RX ADMIN — GLYCOPYRROLATE 0.2 MG: 0.2 INJECTION INTRAMUSCULAR; INTRAVENOUS at 09:12

## 2020-12-17 NOTE — ANESTHESIA POSTPROCEDURE EVALUATION
Anesthesia Post Evaluation    Patient: Su Callejas    Procedure(s) Performed: Procedure(s) (LRB):  ESOPHAGOGASTRODUODENOSCOPY (EGD) (N/A)  MANOMETRY, ESOPHAGUS, WITH IMPEDANCE MEASUREMENT (N/A)    Final Anesthesia Type: general      Patient location during evaluation: PACU  Patient participation: Yes- Able to Participate  Level of consciousness: awake and alert  Post-procedure vital signs: reviewed and stable  Pain management: adequate  Airway patency: patent    PONV status at discharge: No PONV  Anesthetic complications: no      Cardiovascular status: blood pressure returned to baseline  Respiratory status: unassisted  Hydration status: euvolemic  Follow-up not needed.          Vitals Value Taken Time   /62 12/17/20 1032   Temp 36.5 °C (97.7 °F) 12/17/20 1015   Pulse 81 12/17/20 1047   Resp 34 12/17/20 1047   SpO2 85 % 12/17/20 1047   Vitals shown include unvalidated device data.      No case tracking events are documented in the log.      Pain/Alessia Score: Alessia Score: 10 (12/17/2020 11:00 AM)

## 2020-12-17 NOTE — H&P
Short Stay Endoscopy History and Physical    PCP - Rashawn Ortega, DO     Procedure - EGD w EndoFlip and manometry  ASA - per anesthesia  Mallampati - per anesthesia  History of Anesthesia problems - no  Family history Anesthesia problems -  no   Plan of anesthesia - General    HPI:  This is a 75 y.o. female here for evaluation of dysphagia, gerd, ho achalasia s/p myotomy        ROS:  Constitutional: No fevers, chills, No weight loss  CV: No chest pain  Pulm: No cough, No shortness of breath  Ophtho: No vision changes  GI: see HPI  Derm: No rash    Medical History:  has a past medical history of Bladder cancer, CAD (coronary artery disease), Cervical disc disease, CHF (congestive heart failure), Diabetes mellitus, Fibromyalgia, History of kidney cancer, Hyperlipidemia, Hypertension, Lumbar disc disease, Lupus, Malignant neoplasm of urinary bladder, Small bowel obstruction, and Uncomplicated opioid dependence.    Surgical History:  has a past surgical history that includes Colon surgery; Laparoscopic robot-assisted surgical removal of kidney using da Savana Xi (Left); Cardiac catheterization; Upper gastrointestinal endoscopy; Robot-assisted Heller myotomy (10/2019); Robot-assisted Heller myotomy (07/2020); and Laparoscopic right colon resection (2018).    Family History: family history includes Colon cancer in her daughter; Colon cancer (age of onset: 70) in her father; Colon polyps in her father; Colon polyps (age of onset: 52) in her daughter.. Otherwise no colon cancer, inflammatory bowel disease, or GI malignancies.    Social History:  reports that she has quit smoking. Her smoking use included cigarettes. She quit after 25.00 years of use. She has never used smokeless tobacco. She reports that she does not drink alcohol or use drugs.    Review of patient's allergies indicates:   Allergen Reactions    Sulfa (sulfonamide antibiotics) Anaphylaxis     Throat closing    Ondansetron hcl (pf) Itching    Contrast  "media      IV contrast    Iodinated contrast media     Promethazine Other (See Comments)     Patient states that it causes a "restless feeling in legs and arms."       Medications:   Medications Prior to Admission   Medication Sig Dispense Refill Last Dose    albuterol (PROVENTIL/VENTOLIN HFA) 90 mcg/actuation inhaler Inhale 2 puffs into the lungs every 4 (four) hours as needed.   12/17/2020 at Unknown time    aspirin (ECOTRIN) 81 MG EC tablet Take 81 mg by mouth once daily.   Past Week at Unknown time    dexlansoprazole (DEXILANT) 60 mg capsule Take 60 mg by mouth.   12/17/2020 at Unknown time    levothyroxine (SYNTHROID) 25 MCG tablet Take 25 mcg by mouth once daily.   12/17/2020 at Unknown time    MELATONIN ORAL Take by mouth.   12/16/2020 at Unknown time    metoprolol tartrate (LOPRESSOR) 50 MG tablet Take 50 mg by mouth 2 (two) times daily.   12/17/2020 at Unknown time    pregabalin (LYRICA) 100 MG capsule Take 100 mg by mouth.   12/17/2020 at Unknown time    simvastatin (ZOCOR) 20 MG tablet Take 20 mg by mouth every evening.   12/16/2020 at Unknown time    acetaminophen (TYLENOL) 500 MG tablet Take 1,000 mg by mouth.   More than a month at Unknown time    benzonatate (TESSALON) 200 MG capsule    More than a month at Unknown time    dicyclomine (BENTYL) 20 mg tablet    More than a month at Unknown time    DULoxetine (CYMBALTA) 30 MG capsule Take 1 capsule (30 mg total) by mouth every other day. (Patient not taking: Reported on 10/16/2020) 15 capsule 2 More than a month at Unknown time    ergocalciferol (ERGOCALCIFEROL) 50,000 unit Cap Take 50,000 Units by mouth.   12/15/2020    ergocalciferol, vitamin D2, (VITAMIN D ORAL) Take by mouth.   12/15/2020    famotidine (PEPCID) 20 MG tablet Take 1 tablet (20 mg total) by mouth 2 (two) times daily. 60 tablet 0 More than a month at Unknown time    HYDROcodone-acetaminophen (NORCO)  mg per tablet Take 1 tablet by mouth every 6 (six) hours as " needed for Pain. 10 tablet 0 More than a month at Unknown time    HYDROcodone-acetaminophen (NORCO)  mg per tablet Take 1 tablet by mouth every 6 (six) hours as needed. 9 tablet 0 More than a month at Unknown time    nitroGLYCERIN (NITROSTAT) 0.3 MG SL tablet Place 0.3 mg under the tongue every 5 (five) minutes as needed for Chest pain.   More than a month at Unknown time    ondansetron (ZOFRAN) 4 MG tablet    More than a month at Unknown time    promethazine (PHENERGAN) 25 MG tablet Take 1 tablet (25 mg total) by mouth every 6 (six) hours as needed for Nausea. 15 tablet 0 More than a month at Unknown time    ranolazine (RANEXA) 500 MG Tb12 Take 500 mg by mouth 2 (two) times daily.   More than a month at Unknown time    traMADoL (ULTRAM) 50 mg tablet    More than a month at Unknown time    vitamin A 8000 UNIT capsule Take 8,000 Units by mouth.   More than a month at Unknown time       Physical Exam:    Vital Signs:   Vitals:    12/17/20 0830   BP: 136/65   Pulse: 60   Resp: 16   Temp: 98.2 °F (36.8 °C)       General Appearance: Well appearing in no acute distress  Eyes:    No scleral icterus  Lungs: CTA anteriorly  Heart:  Regular rate, S1, S2 normal, no murmurs heard.  Abdomen: Soft, non tender, non distended with normal bowel sounds. No hepatosplenomegaly, ascites, or mass.  Extremities: No edema  Skin: No rash    Labs:  Lab Results   Component Value Date    WBC 8.63 09/22/2020    HGB 9.3 (L) 09/22/2020    HCT 31.5 (L) 09/22/2020     09/22/2020    ALT 16 09/22/2020    AST 19 09/22/2020     09/22/2020    K 3.9 09/22/2020    CL 98 09/22/2020    CREATININE 1.5 (H) 09/22/2020    BUN 18 09/22/2020    CO2 27 09/22/2020    HGBA1C 6.6 (H) 09/23/2020       I have explained the risks and benefits of endoscopy procedures to the patient including but not limited to bleeding, perforation, infection, and death.      Barb Tillman MD

## 2020-12-17 NOTE — TRANSFER OF CARE
"Anesthesia Transfer of Care Note    Patient: Su Callejas    Procedure(s) Performed: Procedure(s) (LRB):  ESOPHAGOGASTRODUODENOSCOPY (EGD) (N/A)  MANOMETRY, ESOPHAGUS, WITH IMPEDANCE MEASUREMENT (N/A)    Patient location: [facility]    Anesthesia Type: general    Transport from OR: Transported from OR on 2-3 L/min O2 by NC with adequate spontaneous ventilation    Post pain: adequate analgesia    Post assessment: no apparent anesthetic complications    Post vital signs: stable    Level of consciousness: awake    Nausea/Vomiting: no nausea/vomiting    Complications: none    Transfer of care protocol was followed      Last vitals:   Visit Vitals  /65 (BP Location: Left arm, Patient Position: Lying)   Pulse 60   Temp 36.8 °C (98.2 °F) (Temporal)   Resp 16   Ht 5' 2" (1.575 m)   Wt 64.9 kg (143 lb)   LMP  (LMP Unknown)   SpO2 96%   BMI 26.16 kg/m²     "

## 2020-12-17 NOTE — ANESTHESIA PREPROCEDURE EVALUATION
12/17/2020  Su Callejas is a 75 y.o., female.    Anesthesia Evaluation    I have reviewed the Patient Summary Reports.      I have reviewed the Medications.     Review of Systems  Anesthesia Hx:  No problems with previous Anesthesia  History of prior surgery of interest to airway management or planning: Previous anesthesia: General   Social:  Former Smoker, No Alcohol Use    Hematology/Oncology:  Hematology Normal   Oncology Normal     EENT/Dental:EENT/Dental Normal   Cardiovascular:   Exercise tolerance: poor Hypertension, well controlled CAD   CHF    Pulmonary:  Pulmonary Normal    Renal/:   Chronic Renal Disease, CRI    Hepatic/GI:  Hepatic/GI Normal    Musculoskeletal:  Musculoskeletal Normal    Neurological:   Neuromuscular Disease,    Endocrine:   Diabetes, well controlled, type 2    Dermatological:  Skin Normal    Psych:  Psychiatric Normal           Physical Exam  General:  Well nourished    Airway/Jaw/Neck:  Airway Findings: Mouth Opening: Normal Tongue: Normal  General Airway Assessment: Adult  Mallampati: II  TM Distance: Normal, at least 6 cm  Jaw/Neck Findings:  Neck ROM: Normal ROM      Dental:  Dental Findings: In tact         Mental Status:  Mental Status Findings:  Cooperative, Alert and Oriented         Anesthesia Plan  Type of Anesthesia, risks & benefits discussed:  Anesthesia Type:  general  Patient's Preference: GA  Intra-op Monitoring Plan: standard ASA monitors  Intra-op Monitoring Plan Comments:   Post Op Pain Control Plan: multimodal analgesia  Post Op Pain Control Plan Comments:   Induction:   IV  Beta Blocker:  Patient is on a Beta-Blocker and has received one dose within the past 24 hours (No further documentation required).       Informed Consent: Patient understands risks and agrees with Anesthesia plan.  Questions answered. Anesthesia consent signed with  patient.  ASA Score: 3     Day of Surgery Review of History & Physical:    H&P update referred to the provider.         Ready For Surgery From Anesthesia Perspective.

## 2020-12-17 NOTE — [employer] PATIENT INSTRUCTIONS
Discharge Summary/Instructions after an Endoscopic Procedure  Patient Name: Su Callejas  Patient MRN: [id number]  Patient YOB: 1945  Thursday, December 17, 2020  Barb Tillman MD  RESTRICTIONS:  During your procedure today, you received medications for sedation.  These   medications may affect your judgment, balance and coordination.  Therefore,   for 24 hours, you have the following restrictions:   - DO NOT drive a car, operate machinery, make legal/financial decisions,   sign important papers or drink alcohol.    ACTIVITY:  Today: no heavy lifting, straining or running due to procedural   sedation/anesthesia.  The following day: return to full activity including work.  DIET:  Eat and drink normally unless instructed otherwise.     TREATMENT FOR COMMON SIDE EFFECTS:  - Mild abdominal pain, nausea, belching, bloating or excessive gas:  rest,   eat lightly and use a heating pad.  - Sore Throat: treat with throat lozenges and/or gargle with warm salt   water.  - Because air was used during the procedure, expelling large amounts of air   from your rectum or belching is normal.  - If a bowel prep was taken, you may not have a bowel movement for 1-3 days.    This is normal.  SYMPTOMS TO WATCH FOR AND REPORT TO YOUR PHYSICIAN:  1. Abdominal pain or bloating, other than gas cramps.  2. Chest pain.  3. Back pain.  4. Signs of infection such as: chills or fever occurring within 24 hours   after the procedure.  5. Rectal bleeding, which would show as bright red, maroon, or black stools.   (A tablespoon of blood from the rectum is not serious, especially if   hemorrhoids are present.)  6. Vomiting.  7. Weakness or dizziness.  GO DIRECTLY TO THE NEAREST EMERGENCY ROOM IF YOU HAVE ANY OF THE FOLLOWING:      Difficulty breathing              Chills and/or fever over 101 F   Persistent vomiting and/or vomiting blood   Severe abdominal pain   Severe chest pain   Black, tarry stools   Bleeding- more than one  tablespoon   Any other symptom or condition that you feel may need urgent attention  Your doctor recommends these additional instructions:  If any biopsies were taken, your doctors clinic will contact you in 1 to 2   weeks with any results.  - Await pathology results.   - Discharge patient to home.   - Resume previous diet.   - Continue present medications.   - The findings and recommendations were discussed with the patient.   - Patient has a contact number available for emergencies.  The signs and   symptoms of potential delayed complications were discussed with the   patient.  Return to normal activities tomorrow.  Written discharge   instructions were provided to the patient.   - Patient has a contact number available for emergencies.  The signs and   symptoms of potential delayed complications were discussed with the   patient.  Return to normal activities tomorrow.  Written discharge   instructions were provided to the patient.   - Resume ASA at prior dose today.  For questions, problems or results please call your physician - Barb Tillman MD at Work:  [phone].  [facility], EMERGENCY ROOM PHONE NUMBER: [phone]  IF A COMPLICATION OR EMERGENCY SITUATION ARISES AND YOU ARE UNABLE TO REACH   YOUR PHYSICIAN - GO DIRECTLY TO THE EMERGENCY ROOM.  Barb Tillman MD  12/17/2020 10:45:14 AM  This report has been verified and signed electronically.  [employer]

## 2020-12-18 ENCOUNTER — TELEPHONE (OUTPATIENT)
Dept: GASTROENTEROLOGY | Facility: HOSPITAL | Age: 75
End: 2020-12-18

## 2020-12-18 NOTE — TELEPHONE ENCOUNTER
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 Tillman after all studies completed - try to schedule for 12/29

## 2020-12-18 NOTE — TELEPHONE ENCOUNTER
Spoke with pt daughter and told her the results of her mother's manometry and also reminder her of her next appt 1/5/2021

## 2020-12-28 LAB
FINAL PATHOLOGIC DIAGNOSIS: NORMAL
GROSS: NORMAL
Lab: NORMAL

## 2020-12-30 ENCOUNTER — TELEPHONE (OUTPATIENT)
Dept: GASTROENTEROLOGY | Facility: CLINIC | Age: 75
End: 2020-12-30

## 2020-12-30 NOTE — TELEPHONE ENCOUNTER
----- Message from Maria C Haas MA sent at 12/29/2020  5:59 PM CST -----    ----- Message -----  From: Barb Tillman MD  Sent: 12/29/2020   5:31 PM CST  To: [facility] Staff    Please let the pt know that pathology from recent procedure shows:    No  significant abnormality    Endoscopy and colonoscopy is not a perfect exam of the colon. Rarely a significant polyp or colon cancer can be missed. He/she should not ignore symptoms such as blood with bowel movements or abdominal pain and report these symptoms to the doctor if they occur.

## 2021-01-05 ENCOUNTER — OFFICE VISIT (OUTPATIENT)
Dept: GASTROENTEROLOGY | Facility: CLINIC | Age: 76
End: 2021-01-05
Payer: MEDICARE

## 2021-01-05 DIAGNOSIS — K21.9 GASTROESOPHAGEAL REFLUX DISEASE, UNSPECIFIED WHETHER ESOPHAGITIS PRESENT: ICD-10-CM

## 2021-01-05 DIAGNOSIS — R11.10 RUMINATION SYNDROME OF INGESTED FOOD IN ADULT: Primary | ICD-10-CM

## 2021-01-05 DIAGNOSIS — R13.10 DYSPHAGIA, UNSPECIFIED TYPE: ICD-10-CM

## 2021-01-05 PROCEDURE — 99499 UNLISTED E&M SERVICE: CPT | Mod: 95,,, | Performed by: INTERNAL MEDICINE

## 2021-01-05 PROCEDURE — 99499 NO LOS: ICD-10-PCS | Mod: 95,,, | Performed by: INTERNAL MEDICINE

## 2021-01-07 ENCOUNTER — TELEPHONE (OUTPATIENT)
Dept: GASTROENTEROLOGY | Facility: CLINIC | Age: 76
End: 2021-01-07

## 2021-02-02 ENCOUNTER — CLINICAL SUPPORT (OUTPATIENT)
Dept: GASTROENTEROLOGY | Facility: CLINIC | Age: 76
End: 2021-02-02

## 2021-02-02 ENCOUNTER — TELEPHONE (OUTPATIENT)
Dept: GASTROENTEROLOGY | Facility: CLINIC | Age: 76
End: 2021-02-02

## 2021-02-02 DIAGNOSIS — E44.0 MODERATE MALNUTRITION: ICD-10-CM

## 2021-02-02 DIAGNOSIS — R13.10 DYSPHAGIA, UNSPECIFIED TYPE: Primary | ICD-10-CM

## 2021-02-02 DIAGNOSIS — K52.9 COLITIS: ICD-10-CM

## 2021-02-02 DIAGNOSIS — M32.9 LUPUS: ICD-10-CM

## 2021-02-02 DIAGNOSIS — K56.609 SMALL BOWEL OBSTRUCTION: ICD-10-CM

## 2021-02-02 DIAGNOSIS — Z90.5 HISTORY OF UNILATERAL NEPHRECTOMY: ICD-10-CM

## 2021-02-12 ENCOUNTER — DOCUMENTATION ONLY (OUTPATIENT)
Dept: GASTROENTEROLOGY | Facility: CLINIC | Age: 76
End: 2021-02-12

## 2021-02-12 ENCOUNTER — TELEPHONE (OUTPATIENT)
Dept: GASTROENTEROLOGY | Facility: CLINIC | Age: 76
End: 2021-02-12

## 2021-02-12 DIAGNOSIS — K44.9 HIATAL HERNIA: Primary | ICD-10-CM

## 2021-02-17 ENCOUNTER — TELEPHONE (OUTPATIENT)
Dept: GASTROENTEROLOGY | Facility: CLINIC | Age: 76
End: 2021-02-17

## 2021-02-26 ENCOUNTER — TELEPHONE (OUTPATIENT)
Dept: SURGERY | Facility: CLINIC | Age: 76
End: 2021-02-26

## 2021-04-13 ENCOUNTER — TELEPHONE (OUTPATIENT)
Dept: GASTROENTEROLOGY | Facility: CLINIC | Age: 76
End: 2021-04-13

## 2022-03-29 ENCOUNTER — TELEPHONE (OUTPATIENT)
Dept: GASTROENTEROLOGY | Facility: CLINIC | Age: 77
End: 2022-03-29
Payer: MEDICARE

## 2022-03-29 NOTE — TELEPHONE ENCOUNTER
Called pt, no answer  mary kay re: scheduling fu visit  Asked pt to call the clinic if interested in scheduling an apt  Clinic ph no given

## 2023-01-19 ENCOUNTER — OUT OF OFFICE VISIT (OUTPATIENT)
Dept: URBAN - METROPOLITAN AREA MEDICAL CENTER 16 | Facility: MEDICAL CENTER | Age: 78
End: 2023-01-19
Payer: MEDICARE

## 2023-01-19 DIAGNOSIS — R11.2 ACUTE NAUSEA WITH NONBILIOUS VOMITING: ICD-10-CM

## 2023-01-19 DIAGNOSIS — R10.84 ABDOMINAL CRAMPING, GENERALIZED: ICD-10-CM

## 2023-01-19 DIAGNOSIS — K92.1 ACUTE MELENA: ICD-10-CM

## 2023-01-19 PROCEDURE — 99203 OFFICE O/P NEW LOW 30 MIN: CPT | Performed by: INTERNAL MEDICINE

## 2023-04-28 ENCOUNTER — TELEPHONE (OUTPATIENT)
Dept: GASTROENTEROLOGY | Facility: CLINIC | Age: 78
End: 2023-04-28
Payer: MEDICARE

## 2023-04-28 NOTE — TELEPHONE ENCOUNTER
Ma attempted to call pt the phone numbers in chart are not working numbers at all (Out of service)

## 2024-11-21 NOTE — PLAN OF CARE
Problem: Patient Care Overview  Goal: Plan of Care Review  Outcome: Ongoing (interventions implemented as appropriate)  Plan of care discussed with patient AAOX4 Vital signs stable afebrile free from falls blood sugar 86 fluids cont with dextrose in it b/p much better today lopressor changed to po tolerated clear liquid diet no nausea jut diarrhea patient was given stool softner today held night time dose patient states feel a whole lot better possible d/c this am patient question whether she is a diabetic sugar was normal during this stay will cont to monitor       prostate chips

## (undated) DEVICE — KIT ENDOKIT COMPLIANCE CUSTOM